# Patient Record
Sex: MALE | Race: ASIAN | NOT HISPANIC OR LATINO | URBAN - METROPOLITAN AREA
[De-identification: names, ages, dates, MRNs, and addresses within clinical notes are randomized per-mention and may not be internally consistent; named-entity substitution may affect disease eponyms.]

---

## 2023-03-09 PROBLEM — M10.9 GOUT: Status: ACTIVE | Noted: 2023-03-09

## 2023-03-09 PROBLEM — D49.6: Status: ACTIVE | Noted: 2023-03-09

## 2023-03-09 PROBLEM — I15.2 HYPERTENSION ASSOCIATED WITH DIABETES (MULTI): Status: ACTIVE | Noted: 2023-03-09

## 2023-03-09 PROBLEM — E11.8 DIABETES MELLITUS WITH COMPLICATION IN ADULT PATIENT (MULTI): Status: ACTIVE | Noted: 2023-03-09

## 2023-03-09 PROBLEM — E11.69 HYPERLIPIDEMIA ASSOCIATED WITH TYPE 2 DIABETES MELLITUS (MULTI): Status: ACTIVE | Noted: 2023-03-09

## 2023-03-09 PROBLEM — N52.9 ERECTILE DYSFUNCTION: Status: ACTIVE | Noted: 2023-03-09

## 2023-03-09 PROBLEM — E11.59 HYPERTENSION ASSOCIATED WITH DIABETES (MULTI): Status: ACTIVE | Noted: 2023-03-09

## 2023-03-09 PROBLEM — E78.5 HYPERLIPIDEMIA ASSOCIATED WITH TYPE 2 DIABETES MELLITUS (MULTI): Status: ACTIVE | Noted: 2023-03-09

## 2023-03-09 RX ORDER — AMLODIPINE BESYLATE 5 MG/1
1 TABLET ORAL DAILY
COMMUNITY
Start: 2022-05-02 | End: 2023-07-11 | Stop reason: SDUPTHER

## 2023-03-09 RX ORDER — BLOOD-GLUCOSE METER
EACH MISCELLANEOUS DAILY
COMMUNITY
Start: 2021-05-25 | End: 2023-07-31 | Stop reason: ALTCHOICE

## 2023-03-09 RX ORDER — LANCETS
EACH MISCELLANEOUS DAILY
COMMUNITY
End: 2023-07-31 | Stop reason: ALTCHOICE

## 2023-03-09 RX ORDER — ATORVASTATIN CALCIUM 40 MG/1
1 TABLET, FILM COATED ORAL NIGHTLY
COMMUNITY
Start: 2014-08-27 | End: 2023-07-11 | Stop reason: SDUPTHER

## 2023-03-09 RX ORDER — INSULIN PUMP SYRINGE, 3 ML
EACH MISCELLANEOUS
COMMUNITY
End: 2023-07-31 | Stop reason: ALTCHOICE

## 2023-03-09 RX ORDER — ASPIRIN 81 MG/1
1 TABLET ORAL DAILY
COMMUNITY
Start: 2021-05-13 | End: 2024-03-26 | Stop reason: SINTOL

## 2023-03-09 RX ORDER — METFORMIN HYDROCHLORIDE 500 MG/1
500 TABLET ORAL NIGHTLY
COMMUNITY
Start: 2021-05-25 | End: 2023-07-11 | Stop reason: SDUPTHER

## 2023-03-09 RX ORDER — PAROXETINE HYDROCHLORIDE 20 MG/1
1 TABLET, FILM COATED ORAL DAILY
COMMUNITY
Start: 2014-10-28 | End: 2023-07-11 | Stop reason: SDUPTHER

## 2023-03-09 RX ORDER — ALLOPURINOL 300 MG/1
1 TABLET ORAL DAILY
COMMUNITY
Start: 2021-11-04 | End: 2023-07-11 | Stop reason: SDUPTHER

## 2023-03-09 RX ORDER — OLMESARTAN MEDOXOMIL 40 MG/1
1 TABLET ORAL DAILY
COMMUNITY
Start: 2021-11-04 | End: 2023-07-11 | Stop reason: SDUPTHER

## 2023-03-10 ENCOUNTER — TELEMEDICINE (OUTPATIENT)
Dept: PRIMARY CARE | Facility: CLINIC | Age: 68
End: 2023-03-10
Payer: MEDICARE

## 2023-03-10 VITALS — WEIGHT: 150 LBS | BODY MASS INDEX: 24.21 KG/M2 | TEMPERATURE: 101 F

## 2023-03-10 DIAGNOSIS — U07.1 ACUTE BRONCHITIS DUE TO COVID-19 VIRUS: Primary | ICD-10-CM

## 2023-03-10 DIAGNOSIS — E11.59 HYPERTENSION ASSOCIATED WITH DIABETES (MULTI): ICD-10-CM

## 2023-03-10 DIAGNOSIS — D49.6: ICD-10-CM

## 2023-03-10 DIAGNOSIS — J20.8 ACUTE BRONCHITIS DUE TO COVID-19 VIRUS: Primary | ICD-10-CM

## 2023-03-10 DIAGNOSIS — M1A.2710 DRUG-INDUCED CHRONIC GOUT OF RIGHT FOOT WITHOUT TOPHUS: ICD-10-CM

## 2023-03-10 DIAGNOSIS — E11.69 HYPERLIPIDEMIA ASSOCIATED WITH TYPE 2 DIABETES MELLITUS (MULTI): ICD-10-CM

## 2023-03-10 DIAGNOSIS — I15.2 HYPERTENSION ASSOCIATED WITH DIABETES (MULTI): ICD-10-CM

## 2023-03-10 DIAGNOSIS — E11.8 DIABETES MELLITUS WITH COMPLICATION IN ADULT PATIENT (MULTI): ICD-10-CM

## 2023-03-10 DIAGNOSIS — E78.5 HYPERLIPIDEMIA ASSOCIATED WITH TYPE 2 DIABETES MELLITUS (MULTI): ICD-10-CM

## 2023-03-10 PROBLEM — F41.9 ANXIETY AND DEPRESSION: Status: ACTIVE | Noted: 2023-03-10

## 2023-03-10 PROBLEM — F32.A ANXIETY AND DEPRESSION: Status: ACTIVE | Noted: 2023-03-10

## 2023-03-10 PROCEDURE — 99212 OFFICE O/P EST SF 10 MIN: CPT | Performed by: INTERNAL MEDICINE

## 2023-03-10 RX ORDER — NIRMATRELVIR AND RITONAVIR 300-100 MG
KIT ORAL
Qty: 30 TABLET | Refills: 0 | Status: SHIPPED | OUTPATIENT
Start: 2023-03-10 | End: 2023-03-15

## 2023-03-10 ASSESSMENT — ENCOUNTER SYMPTOMS
VOMITING: 0
FATIGUE: 1
RHINORRHEA: 1
SORE THROAT: 1
FEVER: 1
SINUS PRESSURE: 1
COUGH: 0
WHEEZING: 0
SHORTNESS OF BREATH: 0
CHILLS: 1
CONSTIPATION: 0
NAUSEA: 0
PALPITATIONS: 0
DIARRHEA: 0

## 2023-03-10 NOTE — PROGRESS NOTES
Subjective   Patient ID: Fortunato Romano is a 67 y.o. male who presents for Establish Care (Positive covid last night, headches, body aches, chills, fever----symptoms started yesterday).    Assessment/Plan   Problem List Items Addressed This Visit          Nervous    Brain tumor, recurrent (CMS/HCC) - Primary     MRI once a year no seizure            Respiratory    Acute bronchitis due to COVID-19 virus     PAXLOVID twice a day for 5 days side effect explained take vitamin C vitamin D zinc melatonin if no better check the CBC BMP D-dimer LDH chest x-ray            Circulatory    Hypertension associated with diabetes (CMS/HCC)     Continue amlodipine aspirin Benicar check BMP every 6-month            Genitourinary    Erectile dysfunction       Endocrine/Metabolic    Diabetes mellitus with complication in adult patient (CMS/HCC)     Advised continue diet exercise plus metformin 500 at night refer to podiatry ophthalmology dietitian         Hyperlipidemia associated with type 2 diabetes mellitus (CMS/HCC)     Low-fat diet continue Lipitor 40 mg a day            Other    Gout     Gout allopurinol 300 mg a day check uric acid once a year gout diet education provided         Anxiety and depression     PHQ less than 5 continue Paxil 20 mg a day PHQ once a year            Source of history: Nurse, Medical personnel, Medical record, Patient.  History limitation: None.    HPI fever chills body ache onset acutely duration 2 days progressed acutely aggravating factor immunocompromised host post viral bronchitis sinusitis checked COVID-positive    Negative for loss of taste or smell    Negative for DVT or PE    Negative for hypoxia or confusion    No hypoglycemia no headache no chest pain no seizure    Allergies   Allergen Reactions    Pollen Extracts Unknown       Current Outpatient Medications   Medication Sig Dispense Refill    allopurinol (Zyloprim) 300 mg tablet Take 1 tablet (300 mg) by mouth once daily.      amLODIPine  (Norvasc) 5 mg tablet Take 1 tablet (5 mg) by mouth once daily.      aspirin 81 mg EC tablet Take 1 tablet (81 mg) by mouth once daily.      atorvastatin (Lipitor) 40 mg tablet Take 1 tablet (40 mg) by mouth once daily at bedtime.      metFORMIN (Glucophage) 500 mg tablet Take 1 tablet (500 mg) by mouth every 3 hours.      olmesartan (BENIcar) 40 mg tablet Take 1 tablet (40 mg) by mouth once daily.      PARoxetine (Paxil) 20 mg tablet Take 1 tablet (20 mg) by mouth once daily.      blood sugar diagnostic (OneTouch Verio test strips) strip once daily.      FreeStyle glucose monitoring kit       lancets misc once daily.       No current facility-administered medications for this visit.       Objective   Visit Vitals  Temp 38.3 °C (101 °F)   Wt 68 kg (150 lb)   BMI 24.21 kg/m²   Smoking Status Former   BSA 1.78 m²     Physical Exam  Review of Systems   Constitutional:  Positive for chills, fatigue and fever.   HENT:  Positive for congestion, ear discharge, ear pain, rhinorrhea, sinus pressure, sneezing and sore throat.    Respiratory:  Negative for cough, shortness of breath and wheezing.    Cardiovascular:  Negative for chest pain, palpitations and leg swelling.   Gastrointestinal:  Negative for constipation, diarrhea, nausea and vomiting.       No visits with results within 4 Month(s) from this visit.   Latest known visit with results is:   Legacy Encounter on 10/03/2022   Component Date Value Ref Range Status    Prostate Specific Antigen,Screen 10/03/2022 0.86  0.00 - 4.00 ng/mL Final    Color, Urine 10/03/2022 YELLOW  STRAW,YELLOW Final    Appearance, Urine 10/03/2022 CLEAR  CLEAR Final    Specific Gravity, Urine 10/03/2022 1.020  1.005 - 1.035 Final    pH, Urine 10/03/2022 6.0  5.0 - 8.0 Final    Protein, Urine 10/03/2022 NEGATIVE  NEGATIVE mg/dL Final    Glucose, Urine 10/03/2022 NEGATIVE  NEGATIVE mg/dL Final    Blood, Urine 10/03/2022 NEGATIVE  NEGATIVE Final    Ketones, Urine 10/03/2022 NEGATIVE  NEGATIVE  mg/dL Final    Bilirubin, Urine 10/03/2022 NEGATIVE  NEGATIVE Final    Urobilinogen, Urine 10/03/2022 <2.0  0.0 - 1.9 mg/dL Final    Nitrite, Urine 10/03/2022 NEGATIVE  NEGATIVE Final    Leukocyte Esterase, Urine 10/03/2022 NEGATIVE  NEGATIVE Final       Radiology: Reviewed imaging in powerchart.  No results found.    Family History   Problem Relation Name Age of Onset    Stomach cancer Mother      Stomach cancer Sister      Brain cancer Son       Social History     Socioeconomic History    Marital status:      Spouse name: None    Number of children: None    Years of education: None    Highest education level: None   Occupational History    None   Tobacco Use    Smoking status: Former     Types: Cigarettes    Smokeless tobacco: Never   Vaping Use    Vaping status: None   Substance and Sexual Activity    Alcohol use: Never    Drug use: Never    Sexual activity: None   Other Topics Concern    None   Social History Narrative    None     Social Determinants of Health     Financial Resource Strain: Not on file   Food Insecurity: Not on file   Transportation Needs: Not on file   Physical Activity: Not on file   Stress: Not on file   Social Connections: Not on file   Intimate Partner Violence: Not on file   Housing Stability: Not on file     Past Medical History:   Diagnosis Date    Benign prostatic hyperplasia without lower urinary tract symptoms 05/25/2021    Enlarged prostate without lower urinary tract symptoms (luts)    Encounter for screening for malignant neoplasm of respiratory organs 05/02/2022    Encounter for screening for lung cancer    Essential (primary) hypertension 05/13/2021    Benign essential hypertension    Generalized anxiety disorder 05/25/2021    VIVEK (generalized anxiety disorder)    Mixed hyperlipidemia 05/13/2021    Hyperlipidemia, mixed    Other abnormal glucose     Elevated glucose    Other conditions influencing health status     Postoperative seroma    Other conditions influencing  health status 02/05/2018    History of cough    Other specified soft tissue disorders 12/14/2017    Soft tissue mass    Pain in left knee 05/30/2017    Left knee pain    Personal history of benign neoplasm of the brain 05/13/2021    History of benign neoplasm of brain    Personal history of neoplasm of uncertain behavior 05/07/2018    History of neoplasm of uncertain behavior    Personal history of other diseases of the circulatory system 05/08/2019    History of hypertension    Personal history of other diseases of the digestive system 05/02/2022    History of fatty infiltration of liver    Personal history of other diseases of the musculoskeletal system and connective tissue 07/05/2022    History of polymyalgia rheumatica    Personal history of other diseases of the musculoskeletal system and connective tissue 11/04/2021    History of gout    Personal history of other diseases of the respiratory system 05/02/2022    History of sinusitis    Personal history of other diseases of the respiratory system 02/05/2018    History of upper respiratory infection    Personal history of other endocrine, nutritional and metabolic disease 11/04/2021    History of diabetes mellitus    Personal history of other endocrine, nutritional and metabolic disease 05/08/2019    History of hyperlipidemia    Personal history of other endocrine, nutritional and metabolic disease 05/25/2021    History of vitamin D deficiency    Personal history of other specified conditions 02/13/2018    History of ataxia    Personal history of other specified conditions 07/22/2016    History of urinary frequency    Personal history of other specified conditions 07/22/2016    History of fever    Personal history of other specified conditions 11/20/2017    History of bradycardia    Vitamin D deficiency, unspecified 09/08/2015    Vitamin D deficiency     Past Surgical History:   Procedure Laterality Date    MANDIBLE SURGERY  11/01/2017    Jaw Surgery    OTHER  SURGICAL HISTORY  07/22/2016    Brain Surgery       Charting was completed using voice recognition technology and may include unintended errors.

## 2023-03-10 NOTE — ASSESSMENT & PLAN NOTE
PAXLOVID twice a day for 5 days side effect explained take vitamin C vitamin D zinc melatonin if no better check the CBC BMP D-dimer LDH chest x-ray

## 2023-03-10 NOTE — ASSESSMENT & PLAN NOTE
Advised continue diet exercise plus metformin 500 at night refer to podiatry ophthalmology dietitian

## 2023-07-10 LAB
ALANINE AMINOTRANSFERASE (SGPT) (U/L) IN SER/PLAS: 14 U/L (ref 10–52)
ALBUMIN (G/DL) IN SER/PLAS: 4.5 G/DL (ref 3.4–5)
ALKALINE PHOSPHATASE (U/L) IN SER/PLAS: 77 U/L (ref 33–136)
ANION GAP IN SER/PLAS: 13 MMOL/L (ref 10–20)
ASPARTATE AMINOTRANSFERASE (SGOT) (U/L) IN SER/PLAS: 14 U/L (ref 9–39)
BASOPHILS (10*3/UL) IN BLOOD BY AUTOMATED COUNT: 0.07 X10E9/L (ref 0–0.1)
BASOPHILS/100 LEUKOCYTES IN BLOOD BY AUTOMATED COUNT: 1 % (ref 0–2)
BILIRUBIN TOTAL (MG/DL) IN SER/PLAS: 0.8 MG/DL (ref 0–1.2)
CALCIUM (MG/DL) IN SER/PLAS: 9.5 MG/DL (ref 8.6–10.6)
CARBON DIOXIDE, TOTAL (MMOL/L) IN SER/PLAS: 23 MMOL/L (ref 21–32)
CHLORIDE (MMOL/L) IN SER/PLAS: 109 MMOL/L (ref 98–107)
CHOLESTEROL (MG/DL) IN SER/PLAS: 148 MG/DL (ref 0–199)
CHOLESTEROL IN HDL (MG/DL) IN SER/PLAS: 46.8 MG/DL
CHOLESTEROL/HDL RATIO: 3.2
CREATININE (MG/DL) IN SER/PLAS: 0.96 MG/DL (ref 0.5–1.3)
EOSINOPHILS (10*3/UL) IN BLOOD BY AUTOMATED COUNT: 0.09 X10E9/L (ref 0–0.7)
EOSINOPHILS/100 LEUKOCYTES IN BLOOD BY AUTOMATED COUNT: 1.2 % (ref 0–6)
ERYTHROCYTE DISTRIBUTION WIDTH (RATIO) BY AUTOMATED COUNT: 12.8 % (ref 11.5–14.5)
ERYTHROCYTE MEAN CORPUSCULAR HEMOGLOBIN CONCENTRATION (G/DL) BY AUTOMATED: 32.5 G/DL (ref 32–36)
ERYTHROCYTE MEAN CORPUSCULAR VOLUME (FL) BY AUTOMATED COUNT: 91 FL (ref 80–100)
ERYTHROCYTES (10*6/UL) IN BLOOD BY AUTOMATED COUNT: 5.19 X10E12/L (ref 4.5–5.9)
ESTIMATED AVERAGE GLUCOSE FOR HBA1C: 134 MG/DL
GFR MALE: 85 ML/MIN/1.73M2
GLUCOSE (MG/DL) IN SER/PLAS: 107 MG/DL (ref 74–99)
HEMATOCRIT (%) IN BLOOD BY AUTOMATED COUNT: 47.1 % (ref 41–52)
HEMOGLOBIN (G/DL) IN BLOOD: 15.3 G/DL (ref 13.5–17.5)
HEMOGLOBIN A1C/HEMOGLOBIN TOTAL IN BLOOD: 6.3 %
IMMATURE GRANULOCYTES/100 LEUKOCYTES IN BLOOD BY AUTOMATED COUNT: 0.3 % (ref 0–0.9)
LDL: 68 MG/DL (ref 0–99)
LEUKOCYTES (10*3/UL) IN BLOOD BY AUTOMATED COUNT: 7.4 X10E9/L (ref 4.4–11.3)
LYMPHOCYTES (10*3/UL) IN BLOOD BY AUTOMATED COUNT: 2.09 X10E9/L (ref 1.2–4.8)
LYMPHOCYTES/100 LEUKOCYTES IN BLOOD BY AUTOMATED COUNT: 28.4 % (ref 13–44)
MONOCYTES (10*3/UL) IN BLOOD BY AUTOMATED COUNT: 0.45 X10E9/L (ref 0.1–1)
MONOCYTES/100 LEUKOCYTES IN BLOOD BY AUTOMATED COUNT: 6.1 % (ref 2–10)
NEUTROPHILS (10*3/UL) IN BLOOD BY AUTOMATED COUNT: 4.64 X10E9/L (ref 1.2–7.7)
NEUTROPHILS/100 LEUKOCYTES IN BLOOD BY AUTOMATED COUNT: 63 % (ref 40–80)
NRBC (PER 100 WBCS) BY AUTOMATED COUNT: 0 /100 WBC (ref 0–0)
PLATELETS (10*3/UL) IN BLOOD AUTOMATED COUNT: 291 X10E9/L (ref 150–450)
POTASSIUM (MMOL/L) IN SER/PLAS: 4.1 MMOL/L (ref 3.5–5.3)
PROTEIN TOTAL: 7.1 G/DL (ref 6.4–8.2)
SODIUM (MMOL/L) IN SER/PLAS: 141 MMOL/L (ref 136–145)
TRIGLYCERIDE (MG/DL) IN SER/PLAS: 168 MG/DL (ref 0–149)
URATE (MG/DL) IN SER/PLAS: 4.3 MG/DL (ref 4–7.5)
UREA NITROGEN (MG/DL) IN SER/PLAS: 18 MG/DL (ref 6–23)
VLDL: 34 MG/DL (ref 0–40)

## 2023-07-11 ENCOUNTER — TELEPHONE (OUTPATIENT)
Dept: PRIMARY CARE | Facility: CLINIC | Age: 68
End: 2023-07-11
Payer: MEDICARE

## 2023-07-11 DIAGNOSIS — E11.8 DIABETES MELLITUS WITH COMPLICATION IN ADULT PATIENT (MULTI): ICD-10-CM

## 2023-07-11 DIAGNOSIS — E78.5 HYPERLIPIDEMIA ASSOCIATED WITH TYPE 2 DIABETES MELLITUS (MULTI): ICD-10-CM

## 2023-07-11 DIAGNOSIS — E11.59 HYPERTENSION ASSOCIATED WITH DIABETES (MULTI): ICD-10-CM

## 2023-07-11 DIAGNOSIS — F41.9 ANXIETY AND DEPRESSION: ICD-10-CM

## 2023-07-11 DIAGNOSIS — I15.2 HYPERTENSION ASSOCIATED WITH DIABETES (MULTI): ICD-10-CM

## 2023-07-11 DIAGNOSIS — M1A.2710 DRUG-INDUCED CHRONIC GOUT OF RIGHT FOOT WITHOUT TOPHUS: ICD-10-CM

## 2023-07-11 DIAGNOSIS — E11.69 HYPERLIPIDEMIA ASSOCIATED WITH TYPE 2 DIABETES MELLITUS (MULTI): ICD-10-CM

## 2023-07-11 DIAGNOSIS — F32.A ANXIETY AND DEPRESSION: ICD-10-CM

## 2023-07-11 RX ORDER — PAROXETINE HYDROCHLORIDE 20 MG/1
20 TABLET, FILM COATED ORAL DAILY
Qty: 30 TABLET | Refills: 0 | Status: SHIPPED | OUTPATIENT
Start: 2023-07-11 | End: 2023-07-31 | Stop reason: SDUPTHER

## 2023-07-11 RX ORDER — ATORVASTATIN CALCIUM 40 MG/1
40 TABLET, FILM COATED ORAL NIGHTLY
Qty: 30 TABLET | Refills: 0 | Status: SHIPPED | OUTPATIENT
Start: 2023-07-11 | End: 2023-07-31 | Stop reason: SDUPTHER

## 2023-07-11 RX ORDER — METFORMIN HYDROCHLORIDE 500 MG/1
500 TABLET ORAL
Qty: 180 TABLET | Refills: 3 | Status: SHIPPED | OUTPATIENT
Start: 2023-07-11 | End: 2023-10-30 | Stop reason: SDUPTHER

## 2023-07-11 RX ORDER — AMLODIPINE BESYLATE 5 MG/1
5 TABLET ORAL DAILY
Qty: 30 TABLET | Refills: 0 | Status: SHIPPED | OUTPATIENT
Start: 2023-07-11 | End: 2023-07-31 | Stop reason: SDUPTHER

## 2023-07-11 RX ORDER — ALLOPURINOL 300 MG/1
300 TABLET ORAL DAILY
Qty: 30 TABLET | Refills: 0 | Status: SHIPPED | OUTPATIENT
Start: 2023-07-11 | End: 2023-07-31 | Stop reason: SDUPTHER

## 2023-07-11 RX ORDER — METFORMIN HYDROCHLORIDE 500 MG/1
500 TABLET ORAL
Qty: 30 TABLET | Refills: 0 | Status: SHIPPED | OUTPATIENT
Start: 2023-07-11 | End: 2023-07-11 | Stop reason: SDUPTHER

## 2023-07-11 RX ORDER — OLMESARTAN MEDOXOMIL 40 MG/1
40 TABLET ORAL DAILY
Qty: 30 TABLET | Refills: 0 | Status: SHIPPED | OUTPATIENT
Start: 2023-07-11 | End: 2023-10-30 | Stop reason: SDUPTHER

## 2023-07-11 NOTE — TELEPHONE ENCOUNTER
I let PT know your results and he understood.  Please call in Metformin if need be to accommodate 1 extra 500 mg pill a day.

## 2023-07-11 NOTE — TELEPHONE ENCOUNTER
----- Message from Lin Barbosa MD sent at 7/11/2023  9:09 AM EDT -----  Your hemoglobin A1c is 6.3 triglyceride 168Continue low-fat low-carb diet increase metformin 500 mg from 1 a day to twice a day

## 2023-07-31 ENCOUNTER — OFFICE VISIT (OUTPATIENT)
Dept: PRIMARY CARE | Facility: CLINIC | Age: 68
End: 2023-07-31
Payer: MEDICARE

## 2023-07-31 VITALS
HEIGHT: 66 IN | HEART RATE: 62 BPM | DIASTOLIC BLOOD PRESSURE: 73 MMHG | OXYGEN SATURATION: 96 % | SYSTOLIC BLOOD PRESSURE: 120 MMHG | WEIGHT: 152 LBS | TEMPERATURE: 97.4 F | BODY MASS INDEX: 24.43 KG/M2

## 2023-07-31 DIAGNOSIS — F32.A ANXIETY AND DEPRESSION: ICD-10-CM

## 2023-07-31 DIAGNOSIS — E11.8 DIABETES MELLITUS WITH COMPLICATION IN ADULT PATIENT (MULTI): ICD-10-CM

## 2023-07-31 DIAGNOSIS — D49.6: ICD-10-CM

## 2023-07-31 DIAGNOSIS — E11.59 HYPERTENSION ASSOCIATED WITH DIABETES (MULTI): ICD-10-CM

## 2023-07-31 DIAGNOSIS — F41.9 ANXIETY AND DEPRESSION: ICD-10-CM

## 2023-07-31 DIAGNOSIS — Z13.6 SCREENING FOR ABDOMINAL AORTIC ANEURYSM: ICD-10-CM

## 2023-07-31 DIAGNOSIS — M1A.2710 DRUG-INDUCED CHRONIC GOUT OF RIGHT FOOT WITHOUT TOPHUS: ICD-10-CM

## 2023-07-31 DIAGNOSIS — E78.5 HYPERLIPIDEMIA ASSOCIATED WITH TYPE 2 DIABETES MELLITUS (MULTI): ICD-10-CM

## 2023-07-31 DIAGNOSIS — N40.0 BENIGN PROSTATIC HYPERPLASIA WITHOUT LOWER URINARY TRACT SYMPTOMS: ICD-10-CM

## 2023-07-31 DIAGNOSIS — E11.69 HYPERLIPIDEMIA ASSOCIATED WITH TYPE 2 DIABETES MELLITUS (MULTI): ICD-10-CM

## 2023-07-31 DIAGNOSIS — Z00.00 MEDICARE ANNUAL WELLNESS VISIT, SUBSEQUENT: Primary | ICD-10-CM

## 2023-07-31 DIAGNOSIS — I15.2 HYPERTENSION ASSOCIATED WITH DIABETES (MULTI): ICD-10-CM

## 2023-07-31 PROBLEM — C71.4: Status: ACTIVE | Noted: 2023-07-31

## 2023-07-31 PROBLEM — C71.4: Status: RESOLVED | Noted: 2023-07-31 | Resolved: 2023-07-31

## 2023-07-31 PROCEDURE — 1159F MED LIST DOCD IN RCRD: CPT | Performed by: INTERNAL MEDICINE

## 2023-07-31 PROCEDURE — 4010F ACE/ARB THERAPY RXD/TAKEN: CPT | Performed by: INTERNAL MEDICINE

## 2023-07-31 PROCEDURE — 3078F DIAST BP <80 MM HG: CPT | Performed by: INTERNAL MEDICINE

## 2023-07-31 PROCEDURE — 1160F RVW MEDS BY RX/DR IN RCRD: CPT | Performed by: INTERNAL MEDICINE

## 2023-07-31 PROCEDURE — 3074F SYST BP LT 130 MM HG: CPT | Performed by: INTERNAL MEDICINE

## 2023-07-31 PROCEDURE — G0439 PPPS, SUBSEQ VISIT: HCPCS | Performed by: INTERNAL MEDICINE

## 2023-07-31 PROCEDURE — 1170F FXNL STATUS ASSESSED: CPT | Performed by: INTERNAL MEDICINE

## 2023-07-31 PROCEDURE — 3044F HG A1C LEVEL LT 7.0%: CPT | Performed by: INTERNAL MEDICINE

## 2023-07-31 PROCEDURE — 1157F ADVNC CARE PLAN IN RCRD: CPT | Performed by: INTERNAL MEDICINE

## 2023-07-31 PROCEDURE — 99213 OFFICE O/P EST LOW 20 MIN: CPT | Performed by: INTERNAL MEDICINE

## 2023-07-31 PROCEDURE — 1036F TOBACCO NON-USER: CPT | Performed by: INTERNAL MEDICINE

## 2023-07-31 RX ORDER — AMLODIPINE BESYLATE 5 MG/1
5 TABLET ORAL DAILY
Qty: 90 TABLET | Refills: 3 | Status: SHIPPED
Start: 2023-07-31 | End: 2023-07-31

## 2023-07-31 RX ORDER — ROSUVASTATIN CALCIUM 10 MG/1
10 TABLET, COATED ORAL DAILY
Qty: 30 TABLET | Refills: 5 | Status: SHIPPED | OUTPATIENT
Start: 2023-07-31 | End: 2023-09-25 | Stop reason: SDUPTHER

## 2023-07-31 RX ORDER — ATORVASTATIN CALCIUM 40 MG/1
40 TABLET, FILM COATED ORAL NIGHTLY
Qty: 90 TABLET | Refills: 3 | Status: SHIPPED
Start: 2023-07-31 | End: 2023-07-31

## 2023-07-31 RX ORDER — ALLOPURINOL 300 MG/1
300 TABLET ORAL DAILY
Qty: 90 TABLET | Refills: 3 | Status: SHIPPED
Start: 2023-07-31 | End: 2023-07-31

## 2023-07-31 RX ORDER — PAROXETINE HYDROCHLORIDE 20 MG/1
20 TABLET, FILM COATED ORAL DAILY
Qty: 90 TABLET | Refills: 3 | Status: SHIPPED
Start: 2023-07-31 | End: 2023-07-31

## 2023-07-31 ASSESSMENT — ACTIVITIES OF DAILY LIVING (ADL)
TAKING_MEDICATION: INDEPENDENT
BATHING: INDEPENDENT
GROCERY_SHOPPING: INDEPENDENT
MANAGING_FINANCES: INDEPENDENT
DRESSING: INDEPENDENT

## 2023-07-31 ASSESSMENT — PATIENT HEALTH QUESTIONNAIRE - PHQ9
1. LITTLE INTEREST OR PLEASURE IN DOING THINGS: NOT AT ALL
SUM OF ALL RESPONSES TO PHQ9 QUESTIONS 1 AND 2: 0
SUM OF ALL RESPONSES TO PHQ9 QUESTIONS 1 AND 2: 0
2. FEELING DOWN, DEPRESSED OR HOPELESS: NOT AT ALL
1. LITTLE INTEREST OR PLEASURE IN DOING THINGS: NOT AT ALL
2. FEELING DOWN, DEPRESSED OR HOPELESS: NOT AT ALL

## 2023-07-31 NOTE — PROGRESS NOTES
Assessment and Plan:  Problem List Items Addressed This Visit       Brain tumor, recurrent (CMS/HCC)     Neology once a year         Diabetes mellitus with complication in adult patient (CMS/HCC)     Diabetes is chronic disease, it does not get cured but it can be controlled, in modern medicine there are variety of measures taken to control DM. Usually we want to preserve beta cell functions. Please understand the disease and how our life style can affect control of glycemia. Diabetes leads to macro and microvascular complications and they could be devastating. It is important to have annual eye check and frequent foot inspection and foot inspection. Kidney dysfunction including dialysis, foot amputations, neuropathy, foot ulcers and accelerated atherosclerotic vascular disease are known complications of uncontrolled DM, pt was educated and explained.           Gout    Relevant Orders    Albumin , Urine Random    CBC and Auto Differential    Comprehensive Metabolic Panel    Hemoglobin A1C    Lipid Panel    Prostate Specific Antigen, Screen    TSH with reflex to Free T4 if abnormal    Hyperlipidemia associated with type 2 diabetes mellitus (CMS/HCC)                Relevant Orders    Albumin , Urine Random    CBC and Auto Differential    Comprehensive Metabolic Panel    Hemoglobin A1C    Lipid Panel    Prostate Specific Antigen, Screen    TSH with reflex to Free T4 if abnormal    Hypertension associated with diabetes (CMS/HCC)     Patients BP readings reviewed and addressed, as we age our arteries turn stiffer and less elastic. Restricting salt consumption and staying physically fit with regular exercise regimen is the only way to keep our vasculature less tonic. Studies have shown that keeping ideal body wt, exercise routine about 140 to 150 minutes a week, eating variety of plant based diet and drinking plentiful water are quite helpful. Monitor BP twice or once a week at home and bring log to be reviewed by me.  Uncontrolled BP has long term consequences including heart failure, myocardial infarction, accelerated atherosclerosis and kidney dysfunction. Therapy reviewed and explained.           Relevant Orders    Albumin , Urine Random    CBC and Auto Differential    Comprehensive Metabolic Panel    Hemoglobin A1C    Lipid Panel    Prostate Specific Antigen, Screen    TSH with reflex to Free T4 if abnormal    Anxiety and depression     Depression is chronic and quite common and notorious mental health disorder and it is quite common and widespread, there are several therapeutics available for depression now a days. It is considered as chemical imbalance disorder and with medications , it can be adjusted. There are side effects from SSRI and SNRIs but on long term, they are well tolerated. Please do not feel awkward or shy to contact us if feel tired, sleepy, lack of energy or aloof/ alone. Untreated depression can bring serious consequences including suicidal ideations, mental health counselling is available if need arises. Usually treatment of depression is long lasting therapy with periodic evaluations and follow ups. Pt with depression no longer have to feel frustrated, helpless or isolated.Detailed discussion was carried out.           Relevant Orders    Albumin , Urine Random    CBC and Auto Differential    Comprehensive Metabolic Panel    Hemoglobin A1C    Lipid Panel    Prostate Specific Antigen, Screen    TSH with reflex to Free T4 if abnormal    Medicare annual wellness visit, subsequent - Primary    Benign prostatic hyperplasia without lower urinary tract symptoms    Relevant Orders    Prostate Specific Antigen, Screen         Chief Complaint:   Annual Medicare Wellness Office Exam/Comprehensive Problem Focused Follow Up and Physical Exam      HPI: This is a 67-year-old patient  had a 2 children    1 brother for sister positive for hypertension hyperlipidemia diabetes    Mother  from old age    Father  with stomach cancer    Today came complaining of the headache dizziness neck pain onset acutely duration 2 weeks progressed slowly    Personal history of hypertension hyperlipidemia hyperglycemia BPH osteoarthritis gouty arthritis anxiety depression erectile dysfunction also had a history of the meningioma brain tumor s/p surgery follow-up with the neurosurgery and neuro-ophthalmology [facility].          Patient Active Problem List:  Patient Active Problem List   Diagnosis    Brain tumor, recurrent (CMS/HCC)    Diabetes mellitus with complication in adult patient (CMS/HCC)    Erectile dysfunction    Gout    Hyperlipidemia associated with type 2 diabetes mellitus (CMS/HCC)    Hypertension associated with diabetes (CMS/HCC)    Acute bronchitis due to COVID-19 virus    Anxiety and depression    Medicare annual wellness visit, subsequent    Benign prostatic hyperplasia without lower urinary tract symptoms          Comprehensive Medical/Surgical/Social/Family History:  Family History   Problem Relation Name Age of Onset    Stomach cancer Mother      Stomach cancer Sister      Brain cancer Son         Past Medical History:   Diagnosis Date    Benign prostatic hyperplasia without lower urinary tract symptoms 05/25/2021    Enlarged prostate without lower urinary tract symptoms (luts)    Encounter for screening for malignant neoplasm of respiratory organs 05/02/2022    Encounter for screening for lung cancer    Essential (primary) hypertension 05/13/2021    Benign essential hypertension    Generalized anxiety disorder 05/25/2021    VIVEK (generalized anxiety disorder)    Mixed hyperlipidemia 05/13/2021    Hyperlipidemia, mixed    Other abnormal glucose     Elevated glucose    Other conditions influencing health status     Postoperative seroma    Other conditions influencing health status 02/05/2018    History of cough    Other specified soft tissue disorders 12/14/2017    Soft tissue mass    Pain in left knee 05/30/2017     Left knee pain    Personal history of benign neoplasm of the brain 05/13/2021    History of benign neoplasm of brain    Personal history of neoplasm of uncertain behavior 05/07/2018    History of neoplasm of uncertain behavior    Personal history of other diseases of the circulatory system 05/08/2019    History of hypertension    Personal history of other diseases of the digestive system 05/02/2022    History of fatty infiltration of liver    Personal history of other diseases of the musculoskeletal system and connective tissue 07/05/2022    History of polymyalgia rheumatica    Personal history of other diseases of the musculoskeletal system and connective tissue 11/04/2021    History of gout    Personal history of other diseases of the respiratory system 05/02/2022    History of sinusitis    Personal history of other diseases of the respiratory system 02/05/2018    History of upper respiratory infection    Personal history of other endocrine, nutritional and metabolic disease 11/04/2021    History of diabetes mellitus    Personal history of other endocrine, nutritional and metabolic disease 05/08/2019    History of hyperlipidemia    Personal history of other endocrine, nutritional and metabolic disease 05/25/2021    History of vitamin D deficiency    Personal history of other specified conditions 02/13/2018    History of ataxia    Personal history of other specified conditions 07/22/2016    History of urinary frequency    Personal history of other specified conditions 07/22/2016    History of fever    Personal history of other specified conditions 11/20/2017    History of bradycardia    Vitamin D deficiency, unspecified 09/08/2015    Vitamin D deficiency       Past Surgical History:   Procedure Laterality Date    MANDIBLE SURGERY  11/01/2017    Jaw Surgery    OTHER SURGICAL HISTORY  07/22/2016    Brain Surgery       Social History     Socioeconomic History    Marital status:      Spouse name: None     Number of children: None    Years of education: None    Highest education level: None   Occupational History    None   Tobacco Use    Smoking status: Former     Types: Cigarettes    Smokeless tobacco: Never   Substance and Sexual Activity    Alcohol use: Never    Drug use: Never    Sexual activity: None   Other Topics Concern    None   Social History Narrative    None     Social Determinants of Health     Financial Resource Strain: Not on file   Food Insecurity: Not on file   Transportation Needs: Not on file   Physical Activity: Not on file   Stress: Not on file   Social Connections: Not on file   Intimate Partner Violence: Not on file   Housing Stability: Not on file       Tobacco/Alcohol/Opioid use, as well as Illicit Drug Use was screened for/reviewed and documented in Social Documentation section of the chart and medication list as appropriate    Allergies and Medications  Allergies   Allergen Reactions    Pollen Extracts Unknown     Current Outpatient Medications   Medication Sig Dispense Refill    aspirin 81 mg EC tablet Take 1 tablet (81 mg) by mouth once daily.      metFORMIN (Glucophage) 500 mg tablet Take 1 tablet (500 mg) by mouth 2 times a day with meals. 180 tablet 3    olmesartan (BENIcar) 40 mg tablet Take 1 tablet (40 mg) by mouth once daily. 30 tablet 0     No current facility-administered medications for this visit.       Medications and Supplements  prescribed by me and other practitioners or clinical pharmacist (such as prescriptions, OTC's, herbal therapies and supplements) were reviewed and documented in the medical record.     Advance directives  Advanced Care Planning (including a Living Will, Healthcare POA, as well as specific end of life choices and/or directives), was discussed for approximately 16 minutes with the patient and/or surrogate, voluntarily, and documented in the Problem List of the medical record.     Cardiac Risk Assessment  Cardiovascular risk was discussed and, if needed,  "lifestyle modifications recommended, including nutritional choices, exercise, and elimination of habits contributing to risk. We agreed on a plan to reduce the current cardiovascular risk based on above discussion as needed.  Aspirin use/disuse was discussed and documented in the Problem List of the medical record after reviewing the updated guidelines below:    Consider low dose Aspirin ( mg) use if the benefit for cardiovascular disease prevention outweighs risk for bleeding complications.   In general, low dose ASA should be considered:  In patients WITHOUT prior MI/stroke/PAD (primary prevention):   a. Age <60: Use if 10-year cardiovascular disease risk >20%, with discussion of risks and benefits with patient  b. Age 60-<70: Use if 10-year cardiovascular disease risk >20% and low bleeding (e.g., gastrointenstinal) risk, with discussion of risks and benefits with patient  c. Age >=70: Do not use    In patients WITH prior MI/stroke/PAD (secondary prevention):   Generally use unless extremely high bleeding (e.g., gastrointenstinal) risk, with discussion of risks and benefits with patient    ROS otherwise negative aside from what was mentioned above in HPI.    Visit Vitals  /73 (BP Location: Left arm, Patient Position: Sitting, BP Cuff Size: Adult)   Pulse 62   Temp 36.3 °C (97.4 °F)   Ht 1.676 m (5' 6\")   Wt 68.9 kg (152 lb)   SpO2 96%   BMI 24.53 kg/m²   Smoking Status Former   BSA 1.79 m²       Physical Exam      Physical Exam:    Appearance: Alert, oriented , cooperative,  in no acute distress. Well nourished & well hydrated.    Skin: Intact,  dry skin, no lesions, rash, petechiae or purpura.     Eyes: PERRLA, EOMs intact,  Conjunctiva pink with no redness or exudates. Cornea & anterior chamber are clear, Eyelids without lesions. No scleral icterus.     ENT: Hearing grossly intact. External auditory canals patent, tympanic membranes intact with visible landmarks. Nares patent, mucus membranes moist. " Dentition without lesions. Pharynx clear, uvula midline.     Neck: Supple, without meningismus. Thyroid not palpable. Trachea at midline. No lymphadenopathy.    Pulmonary: Clear bilaterally with good chest wall excursion. No rales, rhonchi or wheezing. No accessory muscle use or stridor.    Cardiac: Normal S1, S2 without murmur, rub, gallop or extrasystole. No JVD, Carotids without bruits.    Abdomen: Soft, nontender, active bowel sounds.  No palpable organomegaly.  No rebound or guarding.  No CVA tenderness.    Genitourinary: Exam deferred.    Musculoskeletal: Cervical spine tenderness  Neurological:  Cranial nerves II through XII are grossly intact, finger-nose touch is normal, normal sensation, no weakness, no focal findings identified.    Psychiatric: Appropriate mood and affect.   During the course of the visit the patient was educated and counseled about age appropriate screening and preventive services. Completed preventive screenings were documented in the chart and orders were placed for outstanding screenings/procedures as documented in the Assessment and Plan.    Patient Instructions (the written plan) was given to the patient at check out.    Charting was completed using voice recognition technology and may include unintended errors.

## 2023-08-06 DIAGNOSIS — I15.2 HYPERTENSION ASSOCIATED WITH DIABETES (MULTI): ICD-10-CM

## 2023-08-06 DIAGNOSIS — M1A.2710 DRUG-INDUCED CHRONIC GOUT OF RIGHT FOOT WITHOUT TOPHUS: ICD-10-CM

## 2023-08-06 DIAGNOSIS — E11.59 HYPERTENSION ASSOCIATED WITH DIABETES (MULTI): ICD-10-CM

## 2023-08-07 DIAGNOSIS — M1A.2710 DRUG-INDUCED CHRONIC GOUT OF RIGHT FOOT WITHOUT TOPHUS: ICD-10-CM

## 2023-08-07 DIAGNOSIS — I15.2 HYPERTENSION ASSOCIATED WITH DIABETES (MULTI): ICD-10-CM

## 2023-08-07 DIAGNOSIS — E11.59 HYPERTENSION ASSOCIATED WITH DIABETES (MULTI): ICD-10-CM

## 2023-08-07 RX ORDER — ALLOPURINOL 300 MG/1
300 TABLET ORAL DAILY
Qty: 30 TABLET | Refills: 0 | Status: SHIPPED | OUTPATIENT
Start: 2023-08-07 | End: 2023-08-07 | Stop reason: SDUPTHER

## 2023-08-07 RX ORDER — ALLOPURINOL 300 MG/1
300 TABLET ORAL DAILY
Qty: 90 TABLET | Refills: 3 | Status: SHIPPED | OUTPATIENT
Start: 2023-08-07 | End: 2023-10-30 | Stop reason: SDUPTHER

## 2023-08-07 RX ORDER — AMLODIPINE BESYLATE 5 MG/1
5 TABLET ORAL DAILY
Qty: 90 TABLET | Refills: 3 | Status: SHIPPED | OUTPATIENT
Start: 2023-08-07 | End: 2023-10-30 | Stop reason: SDUPTHER

## 2023-08-07 RX ORDER — AMLODIPINE BESYLATE 5 MG/1
5 TABLET ORAL DAILY
Qty: 30 TABLET | Refills: 0 | Status: SHIPPED | OUTPATIENT
Start: 2023-08-07 | End: 2023-08-07 | Stop reason: SDUPTHER

## 2023-08-11 ENCOUNTER — TELEPHONE (OUTPATIENT)
Dept: PRIMARY CARE | Facility: CLINIC | Age: 68
End: 2023-08-11
Payer: MEDICARE

## 2023-08-11 DIAGNOSIS — M19.90 ARTHRITIS: ICD-10-CM

## 2023-08-11 RX ORDER — IBUPROFEN 400 MG/1
400 TABLET ORAL 2 TIMES DAILY
Qty: 60 TABLET | Refills: 0 | Status: SHIPPED | OUTPATIENT
Start: 2023-08-11 | End: 2023-09-25

## 2023-08-11 RX ORDER — GABAPENTIN 100 MG/1
100 CAPSULE ORAL 2 TIMES DAILY
Qty: 60 CAPSULE | Refills: 0 | Status: SHIPPED | OUTPATIENT
Start: 2023-08-11 | End: 2023-09-25

## 2023-08-11 NOTE — TELEPHONE ENCOUNTER
----- Message from Lin Barbosa MD sent at 8/11/2023 11:36 AM EDT -----  IMPRESSION:   Moderate degenerative changes. No acute findings   Advised gabapentin 100 mg twice a day plus ibuprofen 400 twice a day follow-up 4 weeks

## 2023-09-07 ENCOUNTER — TELEPHONE (OUTPATIENT)
Dept: PRIMARY CARE | Facility: CLINIC | Age: 68
End: 2023-09-07

## 2023-09-07 ENCOUNTER — OFFICE VISIT (OUTPATIENT)
Dept: PRIMARY CARE | Facility: CLINIC | Age: 68
End: 2023-09-07
Payer: MEDICARE

## 2023-09-07 VITALS
DIASTOLIC BLOOD PRESSURE: 64 MMHG | BODY MASS INDEX: 24.75 KG/M2 | OXYGEN SATURATION: 94 % | WEIGHT: 154 LBS | HEIGHT: 66 IN | SYSTOLIC BLOOD PRESSURE: 114 MMHG | HEART RATE: 55 BPM

## 2023-09-07 DIAGNOSIS — S39.012A STRAIN OF LUMBAR REGION, INITIAL ENCOUNTER: Primary | ICD-10-CM

## 2023-09-07 DIAGNOSIS — S29.012A STRAIN OF THORACIC BACK REGION: ICD-10-CM

## 2023-09-07 DIAGNOSIS — E11.69 TYPE 2 DIABETES MELLITUS WITH OTHER SPECIFIED COMPLICATION, UNSPECIFIED WHETHER LONG TERM INSULIN USE (MULTI): ICD-10-CM

## 2023-09-07 PROCEDURE — 99214 OFFICE O/P EST MOD 30 MIN: CPT | Performed by: STUDENT IN AN ORGANIZED HEALTH CARE EDUCATION/TRAINING PROGRAM

## 2023-09-07 PROCEDURE — 1125F AMNT PAIN NOTED PAIN PRSNT: CPT | Performed by: STUDENT IN AN ORGANIZED HEALTH CARE EDUCATION/TRAINING PROGRAM

## 2023-09-07 PROCEDURE — 1157F ADVNC CARE PLAN IN RCRD: CPT | Performed by: STUDENT IN AN ORGANIZED HEALTH CARE EDUCATION/TRAINING PROGRAM

## 2023-09-07 PROCEDURE — 3044F HG A1C LEVEL LT 7.0%: CPT | Performed by: STUDENT IN AN ORGANIZED HEALTH CARE EDUCATION/TRAINING PROGRAM

## 2023-09-07 PROCEDURE — 1159F MED LIST DOCD IN RCRD: CPT | Performed by: STUDENT IN AN ORGANIZED HEALTH CARE EDUCATION/TRAINING PROGRAM

## 2023-09-07 PROCEDURE — 96372 THER/PROPH/DIAG INJ SC/IM: CPT | Performed by: STUDENT IN AN ORGANIZED HEALTH CARE EDUCATION/TRAINING PROGRAM

## 2023-09-07 PROCEDURE — 3074F SYST BP LT 130 MM HG: CPT | Performed by: STUDENT IN AN ORGANIZED HEALTH CARE EDUCATION/TRAINING PROGRAM

## 2023-09-07 PROCEDURE — 4010F ACE/ARB THERAPY RXD/TAKEN: CPT | Performed by: STUDENT IN AN ORGANIZED HEALTH CARE EDUCATION/TRAINING PROGRAM

## 2023-09-07 PROCEDURE — 1036F TOBACCO NON-USER: CPT | Performed by: STUDENT IN AN ORGANIZED HEALTH CARE EDUCATION/TRAINING PROGRAM

## 2023-09-07 PROCEDURE — 1160F RVW MEDS BY RX/DR IN RCRD: CPT | Performed by: STUDENT IN AN ORGANIZED HEALTH CARE EDUCATION/TRAINING PROGRAM

## 2023-09-07 PROCEDURE — 3078F DIAST BP <80 MM HG: CPT | Performed by: STUDENT IN AN ORGANIZED HEALTH CARE EDUCATION/TRAINING PROGRAM

## 2023-09-07 RX ORDER — KETOROLAC TROMETHAMINE 30 MG/ML
30 INJECTION, SOLUTION INTRAMUSCULAR; INTRAVENOUS ONCE
Status: COMPLETED | OUTPATIENT
Start: 2023-09-07 | End: 2023-09-07

## 2023-09-07 RX ORDER — METHOCARBAMOL 500 MG/1
500 TABLET, FILM COATED ORAL 4 TIMES DAILY PRN
Qty: 20 TABLET | Refills: 0 | Status: SHIPPED
Start: 2023-09-07 | End: 2023-10-30 | Stop reason: ALTCHOICE

## 2023-09-07 RX ADMIN — KETOROLAC TROMETHAMINE 30 MG: 30 INJECTION, SOLUTION INTRAMUSCULAR; INTRAVENOUS at 10:31

## 2023-09-07 ASSESSMENT — PAIN SCALES - GENERAL
PAINLEVEL: 9
PAINLEVEL_OUTOF10: 9

## 2023-09-07 ASSESSMENT — PAIN SCALES - PAIN ASSESSMENT IN ADVANCED DEMENTIA (PAINAD)
CONSOLABILITY: NO NEED TO CONSOLE
FACIALEXPRESSION: SMILING OR INEXPRESSIVE
BREATHING: NORMAL
TOTALSCORE: 1
BODYLANGUAGE: TENSE, DISTRESSED PACING, FIDGETING

## 2023-09-07 ASSESSMENT — PAIN SCALES - WONG BAKER: WONGBAKER_NUMERICALRESPONSE: HURTS LITTLE BIT

## 2023-09-07 NOTE — TELEPHONE ENCOUNTER
PT called back and listed all the meds he is taking.    aspirin 81 mg EC tablet ONCE A DAY  ibuprofen 400 mg tablet  2 TIMES DAILY  gabapentin (Neurontin) 100 mg capsule 2 times daily  amLODIPine (Norvasc) 5 mg tablet   metFORMIN (Glucophage) 500 mg tablet   rosuvastatin (Crestor) 10 mg tablet   allopurinol (Zyloprim) 300 mg tablet       He also takes these, but I did not see on list.  Atorvastan 40 mg once daily  Paroxetine 20 mg once daily

## 2023-09-07 NOTE — PROGRESS NOTES
"Subjective   Patient ID: Fortunato Romano is a 67 y.o. male who presents for the following    Assessment/Plan   #Thoracic Back Strain   #Lumbar Back Strain   #Hx of DM   -Labs reviewed  -Recent Cervical XR in July 2023 shows moderate degenerative changes only   -Med list reviewed.    PLAN  -Continue activity as tolerated.   -Advised to avoid heavy lifting until pain subsides  -APAP PRN for pain relief  -Robaxin 500mg PO QID PRN   -No indication for radiology at this time. No CTLS spine tenderness/deformity, no radiculopathy/myelopathic symptoms/signs.     Patient advised to continue activity as tolerated with frequent stretching of the thoracolumbar muscles. Advised to avoid heavy lifting. Will follow up in 1-2 weeks if pain does not improve.       Follow up with PCP as previously scheduled.     HPI  67M presents for acute visit.     He states that about 10 days ago there was a large fallen tree branch in his yard. He cut the branch and carried it to the trash and since then he has had Mid to low back pain. The pain is moderate to severe in severity without any major worsening or alleviation. It is minimally alleviated by using PRN APAP. No previous hx of back pain/strain. Denies any numbness/tingling/weakness in legs. Denies any saddle anesthesia, urinary/bowel incontinence.     Denies fevers, chills, weight loss, lightheadedness, dizziness, vision changes, sore throat, runny nose, CP, SOB, cough, palpitations, n/v/d, abd pain, black/bloody stools, or new numbness/weakness/tingling in arms/legs/face.        Social  T: denies  A: denies  D: denies    Retired      Visit Vitals  /64   Pulse 55   Ht 1.676 m (5' 6\")   Wt 69.9 kg (154 lb)   SpO2 94%   BMI 24.86 kg/m²   Smoking Status Former   BSA 1.8 m²     PHYSICAL EXAM   Physical Exam     Visit Vitals  /64   Pulse 55   Ht 1.676 m (5' 6\")   Wt 69.9 kg (154 lb)   SpO2 94%   BMI 24.86 kg/m²   Smoking Status Former   BSA 1.8 m²        General: NAD. NCAT. Aox3 "   HEENT: DEVANG. EOMI. MMM. Nares patent bl.  Cardiovascular: RRR. No MRG. S1/S2 wnl.   Respiratory: CTABL. No acute respiratory distress.   GI: Soft, NT abdomen.   : No CVAT BL  MSK: ROM x 4. CTLS non-tender without palpable deformity. He has paraspinal muscle tenderness of the BL thoraco lumbar regions. Negative SLR BL. No SI joint tenderness. No anterior, posterior, lateral hip tenderness BP. ROM of hip intact BL.   Extremities: No edema.  Skin: No rashes or bruises.   Neuro: Aox3. Cranial Nerves grossly intact. Motor/sensory wnl.   Psych: Mood wnl.       REVIEW OF SYSTEMS     ROS In HPI   Allergies   Allergen Reactions    Pollen Extracts Unknown       Current Outpatient Medications   Medication Sig Dispense Refill    allopurinol (Zyloprim) 300 mg tablet Take 1 tablet (300 mg) by mouth once daily. 90 tablet 3    amLODIPine (Norvasc) 5 mg tablet Take 1 tablet (5 mg) by mouth once daily. 90 tablet 3    aspirin 81 mg EC tablet Take 1 tablet (81 mg) by mouth once daily.      gabapentin (Neurontin) 100 mg capsule Take 1 capsule (100 mg) by mouth 2 times a day. 60 capsule 0    ibuprofen 400 mg tablet Take 1 tablet (400 mg) by mouth 2 times a day. 60 tablet 0    metFORMIN (Glucophage) 500 mg tablet Take 1 tablet (500 mg) by mouth 2 times a day with meals. 180 tablet 3    olmesartan (BENIcar) 40 mg tablet Take 1 tablet (40 mg) by mouth once daily. 30 tablet 0    rosuvastatin (Crestor) 10 mg tablet Take 1 tablet (10 mg) by mouth once daily. 30 tablet 5     No current facility-administered medications for this visit.       Objective     Orders Only on 07/10/2023   Component Date Value Ref Range Status    Glucose 07/10/2023 107 (H)  74 - 99 mg/dL Final    Sodium 07/10/2023 141  136 - 145 mmol/L Final    Potassium 07/10/2023 4.1  3.5 - 5.3 mmol/L Final    Chloride 07/10/2023 109 (H)  98 - 107 mmol/L Final    Bicarbonate 07/10/2023 23  21 - 32 mmol/L Final    Anion Gap 07/10/2023 13  10 - 20 mmol/L Final    Urea Nitrogen  07/10/2023 18  6 - 23 mg/dL Final    Creatinine 07/10/2023 0.96  0.50 - 1.30 mg/dL Final    GFR MALE 07/10/2023 85  >90 mL/min/1.73m2 Final    Calcium 07/10/2023 9.5  8.6 - 10.6 mg/dL Final    Albumin 07/10/2023 4.5  3.4 - 5.0 g/dL Final    Alkaline Phosphatase 07/10/2023 77  33 - 136 U/L Final    Total Protein 07/10/2023 7.1  6.4 - 8.2 g/dL Final    AST 07/10/2023 14  9 - 39 U/L Final    Total Bilirubin 07/10/2023 0.8  0.0 - 1.2 mg/dL Final    ALT (SGPT) 07/10/2023 14  10 - 52 U/L Final    Cholesterol 07/10/2023 148  0 - 199 mg/dL Final    HDL 07/10/2023 46.8  mg/dL Final    Cholesterol/HDL Ratio 07/10/2023 3.2   Final    LDL 07/10/2023 68  0 - 99 mg/dL Final    VLDL 07/10/2023 34  0 - 40 mg/dL Final    Triglycerides 07/10/2023 168 (H)  0 - 149 mg/dL Final    WBC 07/10/2023 7.4  4.4 - 11.3 x10E9/L Final    nRBC 07/10/2023 0.0  0.0 - 0.0 /100 WBC Final    RBC 07/10/2023 5.19  4.50 - 5.90 x10E12/L Final    Hemoglobin 07/10/2023 15.3  13.5 - 17.5 g/dL Final    Hematocrit 07/10/2023 47.1  41.0 - 52.0 % Final    MCV 07/10/2023 91  80 - 100 fL Final    MCHC 07/10/2023 32.5  32.0 - 36.0 g/dL Final    Platelets 07/10/2023 291  150 - 450 x10E9/L Final    RDW 07/10/2023 12.8  11.5 - 14.5 % Final    Neutrophils % 07/10/2023 63.0  40.0 - 80.0 % Final    Immature Granulocytes %, Automated 07/10/2023 0.3  0.0 - 0.9 % Final    Lymphocytes % 07/10/2023 28.4  13.0 - 44.0 % Final    Monocytes % 07/10/2023 6.1  2.0 - 10.0 % Final    Eosinophils % 07/10/2023 1.2  0.0 - 6.0 % Final    Basophils % 07/10/2023 1.0  0.0 - 2.0 % Final    Neutrophils Absolute 07/10/2023 4.64  1.20 - 7.70 x10E9/L Final    Lymphocytes Absolute 07/10/2023 2.09  1.20 - 4.80 x10E9/L Final    Monocytes Absolute 07/10/2023 0.45  0.10 - 1.00 x10E9/L Final    Eosinophils Absolute 07/10/2023 0.09  0.00 - 0.70 x10E9/L Final    Basophils Absolute 07/10/2023 0.07  0.00 - 0.10 x10E9/L Final    Hemoglobin A1C 07/10/2023 6.3 (A)  % Final    Estimated Average Glucose  07/10/2023 134  MG/DL Final    Uric Acid 07/10/2023 4.3  4.0 - 7.5 mg/dL Final       Radiology: Reviewed imaging in powerchart.  XR cervical spine complete 4-5 views    Result Date: 7/31/2023  Xrays cervical Spine upright AP lateral obliques 4 views  CLINICAL HISTORY: PAIN,  neck pain, chronic COMPARISON:  MRI cervical 10/21/2014 FINDINGS: Lateral view shows straightening of cervical lordosis but otherwise normal cervical body height and alignment with no fracture or aggressive bone lesion. There is disc space narrowing and endplate spurring most prominent at C5-6 and C6-7. Multilevel mild-to-moderate facet arthropathy. Limited evaluation of foramina on the oblique views particularly on the left side. Postop hardware in the mandible and occipital bone. Normal prevertebral soft tissue space. IMPRESSION: Moderate degenerative changes. No acute findings. Electronically signed by:  Nathanael Méndez MD  8/8/2023 9:55 AM CDT Workstation: [id number] Technologist:  AN Dictated By:   NATHANAEL MÉNDEZ MD Signed By:     NATHANAEL MÉNDEZ MD Signed Out:    08/08/23 10:55:52      Family History   Problem Relation Name Age of Onset    Stomach cancer Mother      Stomach cancer Sister      Brain cancer Son       Social History     Socioeconomic History    Marital status:      Spouse name: None    Number of children: None    Years of education: None    Highest education level: None   Occupational History    None   Tobacco Use    Smoking status: Former     Types: Cigarettes    Smokeless tobacco: Never   Substance and Sexual Activity    Alcohol use: Never    Drug use: Never    Sexual activity: None   Other Topics Concern    None   Social History Narrative    None     Social Determinants of Health     Financial Resource Strain: Not on file   Food Insecurity: Not on file   Transportation Needs: Not on file   Physical Activity: Not on file   Stress: Not on file   Social Connections: Not on file   Intimate Partner Violence: Not on file    Housing Stability: Not on file     Past Medical History:   Diagnosis Date    Benign prostatic hyperplasia without lower urinary tract symptoms 05/25/2021    Enlarged prostate without lower urinary tract symptoms (luts)    Encounter for screening for malignant neoplasm of respiratory organs 05/02/2022    Encounter for screening for lung cancer    Essential (primary) hypertension 05/13/2021    Benign essential hypertension    Generalized anxiety disorder 05/25/2021    VIVEK (generalized anxiety disorder)    Mixed hyperlipidemia 05/13/2021    Hyperlipidemia, mixed    Other abnormal glucose     Elevated glucose    Other conditions influencing health status     Postoperative seroma    Other conditions influencing health status 02/05/2018    History of cough    Other specified soft tissue disorders 12/14/2017    Soft tissue mass    Pain in left knee 05/30/2017    Left knee pain    Personal history of benign neoplasm of the brain 05/13/2021    History of benign neoplasm of brain    Personal history of neoplasm of uncertain behavior 05/07/2018    History of neoplasm of uncertain behavior    Personal history of other diseases of the circulatory system 05/08/2019    History of hypertension    Personal history of other diseases of the digestive system 05/02/2022    History of fatty infiltration of liver    Personal history of other diseases of the musculoskeletal system and connective tissue 07/05/2022    History of polymyalgia rheumatica    Personal history of other diseases of the musculoskeletal system and connective tissue 11/04/2021    History of gout    Personal history of other diseases of the respiratory system 05/02/2022    History of sinusitis    Personal history of other diseases of the respiratory system 02/05/2018    History of upper respiratory infection    Personal history of other endocrine, nutritional and metabolic disease 11/04/2021    History of diabetes mellitus    Personal history of other endocrine,  nutritional and metabolic disease 05/08/2019    History of hyperlipidemia    Personal history of other endocrine, nutritional and metabolic disease 05/25/2021    History of vitamin D deficiency    Personal history of other specified conditions 02/13/2018    History of ataxia    Personal history of other specified conditions 07/22/2016    History of urinary frequency    Personal history of other specified conditions 07/22/2016    History of fever    Personal history of other specified conditions 11/20/2017    History of bradycardia    Vitamin D deficiency, unspecified 09/08/2015    Vitamin D deficiency     Past Surgical History:   Procedure Laterality Date    MANDIBLE SURGERY  11/01/2017    Jaw Surgery    OTHER SURGICAL HISTORY  07/22/2016    Brain Surgery       Charting was completed using voice recognition technology and may include unintended errors.

## 2023-09-24 DIAGNOSIS — M19.90 ARTHRITIS: ICD-10-CM

## 2023-09-25 DIAGNOSIS — E78.5 HYPERLIPIDEMIA ASSOCIATED WITH TYPE 2 DIABETES MELLITUS (MULTI): ICD-10-CM

## 2023-09-25 DIAGNOSIS — M19.90 ARTHRITIS: ICD-10-CM

## 2023-09-25 DIAGNOSIS — E11.69 HYPERLIPIDEMIA ASSOCIATED WITH TYPE 2 DIABETES MELLITUS (MULTI): ICD-10-CM

## 2023-09-25 RX ORDER — GABAPENTIN 100 MG/1
100 CAPSULE ORAL 2 TIMES DAILY
Qty: 60 CAPSULE | Refills: 0 | Status: SHIPPED | OUTPATIENT
Start: 2023-09-25 | End: 2023-10-30 | Stop reason: SDUPTHER

## 2023-09-25 RX ORDER — GABAPENTIN 100 MG/1
100 CAPSULE ORAL 2 TIMES DAILY
Qty: 60 CAPSULE | Refills: 0 | Status: SHIPPED | OUTPATIENT
Start: 2023-09-25 | End: 2023-09-25 | Stop reason: SDUPTHER

## 2023-09-25 RX ORDER — ROSUVASTATIN CALCIUM 10 MG/1
10 TABLET, COATED ORAL DAILY
Qty: 30 TABLET | Refills: 5 | Status: SHIPPED | OUTPATIENT
Start: 2023-09-25 | End: 2023-10-30 | Stop reason: SDUPTHER

## 2023-09-25 RX ORDER — IBUPROFEN 400 MG/1
400 TABLET ORAL 2 TIMES DAILY
Qty: 60 TABLET | Refills: 0 | Status: SHIPPED
Start: 2023-09-25 | End: 2024-03-26 | Stop reason: SINTOL

## 2023-09-25 RX ORDER — IBUPROFEN 400 MG/1
400 TABLET ORAL 2 TIMES DAILY
Qty: 60 TABLET | Refills: 0 | Status: SHIPPED | OUTPATIENT
Start: 2023-09-25 | End: 2023-09-25 | Stop reason: SDUPTHER

## 2023-10-30 ENCOUNTER — LAB (OUTPATIENT)
Dept: LAB | Facility: LAB | Age: 68
End: 2023-10-30
Payer: MEDICARE

## 2023-10-30 ENCOUNTER — OFFICE VISIT (OUTPATIENT)
Dept: PRIMARY CARE | Facility: CLINIC | Age: 68
End: 2023-10-30
Payer: MEDICARE

## 2023-10-30 VITALS
HEART RATE: 63 BPM | BODY MASS INDEX: 24.56 KG/M2 | DIASTOLIC BLOOD PRESSURE: 65 MMHG | SYSTOLIC BLOOD PRESSURE: 125 MMHG | TEMPERATURE: 97.7 F | OXYGEN SATURATION: 96 % | HEIGHT: 66 IN | WEIGHT: 152.8 LBS

## 2023-10-30 DIAGNOSIS — I15.2 HYPERTENSION ASSOCIATED WITH DIABETES (MULTI): ICD-10-CM

## 2023-10-30 DIAGNOSIS — F41.9 ANXIETY AND DEPRESSION: ICD-10-CM

## 2023-10-30 DIAGNOSIS — M19.90 ARTHRITIS: ICD-10-CM

## 2023-10-30 DIAGNOSIS — E11.69 HYPERLIPIDEMIA ASSOCIATED WITH TYPE 2 DIABETES MELLITUS (MULTI): ICD-10-CM

## 2023-10-30 DIAGNOSIS — E78.5 HYPERLIPIDEMIA ASSOCIATED WITH TYPE 2 DIABETES MELLITUS (MULTI): ICD-10-CM

## 2023-10-30 DIAGNOSIS — E11.8 DIABETES MELLITUS WITH COMPLICATION IN ADULT PATIENT (MULTI): ICD-10-CM

## 2023-10-30 DIAGNOSIS — F32.A ANXIETY AND DEPRESSION: ICD-10-CM

## 2023-10-30 DIAGNOSIS — E11.59 HYPERTENSION ASSOCIATED WITH DIABETES (MULTI): ICD-10-CM

## 2023-10-30 DIAGNOSIS — M1A.2710 DRUG-INDUCED CHRONIC GOUT OF RIGHT FOOT WITHOUT TOPHUS: ICD-10-CM

## 2023-10-30 DIAGNOSIS — D49.6: Primary | ICD-10-CM

## 2023-10-30 PROBLEM — J20.8 ACUTE BRONCHITIS DUE TO COVID-19 VIRUS: Status: RESOLVED | Noted: 2023-03-10 | Resolved: 2023-10-30

## 2023-10-30 PROBLEM — U07.1 ACUTE BRONCHITIS DUE TO COVID-19 VIRUS: Status: RESOLVED | Noted: 2023-03-10 | Resolved: 2023-10-30

## 2023-10-30 LAB
ALBUMIN SERPL BCP-MCNC: 4.5 G/DL (ref 3.4–5)
ALP SERPL-CCNC: 92 U/L (ref 33–136)
ALT SERPL W P-5'-P-CCNC: 22 U/L (ref 10–52)
ANION GAP SERPL CALC-SCNC: 17 MMOL/L (ref 10–20)
AST SERPL W P-5'-P-CCNC: 18 U/L (ref 9–39)
BASOPHILS # BLD AUTO: 0.06 X10*3/UL (ref 0–0.1)
BASOPHILS NFR BLD AUTO: 1 %
BILIRUB SERPL-MCNC: 0.6 MG/DL (ref 0–1.2)
BUN SERPL-MCNC: 14 MG/DL (ref 6–23)
CALCIUM SERPL-MCNC: 9.5 MG/DL (ref 8.6–10.6)
CHLORIDE SERPL-SCNC: 107 MMOL/L (ref 98–107)
CHOLEST SERPL-MCNC: 151 MG/DL (ref 0–199)
CHOLESTEROL/HDL RATIO: 3.3
CO2 SERPL-SCNC: 22 MMOL/L (ref 21–32)
CREAT SERPL-MCNC: 0.89 MG/DL (ref 0.5–1.3)
EOSINOPHIL # BLD AUTO: 0.07 X10*3/UL (ref 0–0.7)
EOSINOPHIL NFR BLD AUTO: 1.1 %
ERYTHROCYTE [DISTWIDTH] IN BLOOD BY AUTOMATED COUNT: 12.9 % (ref 11.5–14.5)
EST. AVERAGE GLUCOSE BLD GHB EST-MCNC: 137 MG/DL
GFR SERPL CREATININE-BSD FRML MDRD: >90 ML/MIN/1.73M*2
GLUCOSE SERPL-MCNC: 106 MG/DL (ref 74–99)
HBA1C MFR BLD: 6.4 %
HCT VFR BLD AUTO: 47.4 % (ref 41–52)
HDLC SERPL-MCNC: 45.2 MG/DL
HGB BLD-MCNC: 15.4 G/DL (ref 13.5–17.5)
IMM GRANULOCYTES # BLD AUTO: 0.02 X10*3/UL (ref 0–0.7)
IMM GRANULOCYTES NFR BLD AUTO: 0.3 % (ref 0–0.9)
LDLC SERPL CALC-MCNC: 73 MG/DL
LYMPHOCYTES # BLD AUTO: 2.03 X10*3/UL (ref 1.2–4.8)
LYMPHOCYTES NFR BLD AUTO: 32.8 %
MCH RBC QN AUTO: 28.6 PG (ref 26–34)
MCHC RBC AUTO-ENTMCNC: 32.5 G/DL (ref 32–36)
MCV RBC AUTO: 88 FL (ref 80–100)
MONOCYTES # BLD AUTO: 0.6 X10*3/UL (ref 0.1–1)
MONOCYTES NFR BLD AUTO: 9.7 %
NEUTROPHILS # BLD AUTO: 3.41 X10*3/UL (ref 1.2–7.7)
NEUTROPHILS NFR BLD AUTO: 55.1 %
NON HDL CHOLESTEROL: 106 MG/DL (ref 0–149)
NRBC BLD-RTO: 0 /100 WBCS (ref 0–0)
PLATELET # BLD AUTO: 284 X10*3/UL (ref 150–450)
PMV BLD AUTO: 9.6 FL (ref 7.5–11.5)
POTASSIUM SERPL-SCNC: 4.3 MMOL/L (ref 3.5–5.3)
PROT SERPL-MCNC: 7.4 G/DL (ref 6.4–8.2)
RBC # BLD AUTO: 5.38 X10*6/UL (ref 4.5–5.9)
SODIUM SERPL-SCNC: 142 MMOL/L (ref 136–145)
TRIGL SERPL-MCNC: 165 MG/DL (ref 0–149)
VLDL: 33 MG/DL (ref 0–40)
WBC # BLD AUTO: 6.2 X10*3/UL (ref 4.4–11.3)

## 2023-10-30 PROCEDURE — 85025 COMPLETE CBC W/AUTO DIFF WBC: CPT

## 2023-10-30 PROCEDURE — 1125F AMNT PAIN NOTED PAIN PRSNT: CPT | Performed by: INTERNAL MEDICINE

## 2023-10-30 PROCEDURE — 99214 OFFICE O/P EST MOD 30 MIN: CPT | Performed by: INTERNAL MEDICINE

## 2023-10-30 PROCEDURE — 1160F RVW MEDS BY RX/DR IN RCRD: CPT | Performed by: INTERNAL MEDICINE

## 2023-10-30 PROCEDURE — 3078F DIAST BP <80 MM HG: CPT | Performed by: INTERNAL MEDICINE

## 2023-10-30 PROCEDURE — 80061 LIPID PANEL: CPT

## 2023-10-30 PROCEDURE — 1036F TOBACCO NON-USER: CPT | Performed by: INTERNAL MEDICINE

## 2023-10-30 PROCEDURE — 36415 COLL VENOUS BLD VENIPUNCTURE: CPT

## 2023-10-30 PROCEDURE — 1159F MED LIST DOCD IN RCRD: CPT | Performed by: INTERNAL MEDICINE

## 2023-10-30 PROCEDURE — 83036 HEMOGLOBIN GLYCOSYLATED A1C: CPT

## 2023-10-30 PROCEDURE — 3044F HG A1C LEVEL LT 7.0%: CPT | Performed by: INTERNAL MEDICINE

## 2023-10-30 PROCEDURE — 4010F ACE/ARB THERAPY RXD/TAKEN: CPT | Performed by: INTERNAL MEDICINE

## 2023-10-30 PROCEDURE — 80053 COMPREHEN METABOLIC PANEL: CPT

## 2023-10-30 PROCEDURE — 3074F SYST BP LT 130 MM HG: CPT | Performed by: INTERNAL MEDICINE

## 2023-10-30 RX ORDER — OLMESARTAN MEDOXOMIL 40 MG/1
40 TABLET ORAL DAILY
Qty: 90 TABLET | Refills: 3 | Status: SHIPPED | OUTPATIENT
Start: 2023-10-30 | End: 2023-12-18

## 2023-10-30 RX ORDER — METFORMIN HYDROCHLORIDE 500 MG/1
500 TABLET ORAL
Qty: 180 TABLET | Refills: 3 | Status: SHIPPED
Start: 2023-10-30 | End: 2024-03-26 | Stop reason: SINTOL

## 2023-10-30 RX ORDER — GABAPENTIN 100 MG/1
100 CAPSULE ORAL 2 TIMES DAILY
Qty: 60 CAPSULE | Refills: 0 | Status: SHIPPED | OUTPATIENT
Start: 2023-10-30 | End: 2023-12-18

## 2023-10-30 RX ORDER — PAROXETINE HYDROCHLORIDE 20 MG/1
20 TABLET, FILM COATED ORAL EVERY MORNING
COMMUNITY
Start: 2017-12-01 | End: 2023-10-30 | Stop reason: SDUPTHER

## 2023-10-30 RX ORDER — ALLOPURINOL 300 MG/1
300 TABLET ORAL DAILY
Qty: 90 TABLET | Refills: 3 | Status: SHIPPED | OUTPATIENT
Start: 2023-10-30

## 2023-10-30 RX ORDER — PAROXETINE HYDROCHLORIDE 20 MG/1
20 TABLET, FILM COATED ORAL EVERY MORNING
Qty: 90 TABLET | Refills: 3 | Status: SHIPPED | OUTPATIENT
Start: 2023-10-30

## 2023-10-30 RX ORDER — AMLODIPINE BESYLATE 5 MG/1
5 TABLET ORAL DAILY
Qty: 90 TABLET | Refills: 3 | Status: SHIPPED
Start: 2023-10-30 | End: 2024-05-28 | Stop reason: WASHOUT

## 2023-10-30 RX ORDER — ROSUVASTATIN CALCIUM 10 MG/1
10 TABLET, COATED ORAL DAILY
Qty: 90 TABLET | Refills: 3 | Status: SHIPPED
Start: 2023-10-30 | End: 2024-05-28 | Stop reason: WASHOUT

## 2023-10-30 NOTE — PROGRESS NOTES
Subjective   Patient ID: Fortunato Romano is a 68 y.o. male who presents for Follow-up (3 month on Ultrasound and Xrays).    Assessment/Plan     Problem List Items Addressed This Visit       Brain tumor, recurrent (CMS/HCC) - Primary     Neurology follow-up once a year         Diabetes mellitus with complication in adult patient (CMS/HCC)     Refer to podiatry ophthalmology         Relevant Medications    metFORMIN (Glucophage) 500 mg tablet    Other Relevant Orders    CBC and Auto Differential    Comprehensive Metabolic Panel    Hemoglobin A1C    Lipid Panel    Albumin , Urine Random    Gout    Relevant Medications    allopurinol (Zyloprim) 300 mg tablet    Other Relevant Orders    CBC and Auto Differential    Comprehensive Metabolic Panel    Hemoglobin A1C    Lipid Panel    Albumin , Urine Random    Hyperlipidemia associated with type 2 diabetes mellitus (CMS/HCC)     Diabetes is chronic disease, it does not get cured but it can be controlled, in modern medicine there are variety of measures taken to control DM. Usually we want to preserve beta cell functions. Please understand the disease and how our life style can affect control of glycemia. Diabetes leads to macro and microvascular complications and they could be devastating. It is important to have annual eye check and frequent foot inspection and foot inspection. Kidney dysfunction including dialysis, foot amputations, neuropathy, foot ulcers and accelerated atherosclerotic vascular disease are known complications of uncontrolled DM, pt was educated and explained.           Relevant Medications    rosuvastatin (Crestor) 10 mg tablet    Other Relevant Orders    CBC and Auto Differential    Comprehensive Metabolic Panel    Hemoglobin A1C    Lipid Panel    Albumin , Urine Random    Hypertension associated with diabetes (CMS/HCC)     Patients BP readings reviewed and addressed, as we age our arteries turn stiffer and less elastic. Restricting salt consumption and  staying physically fit with regular exercise regimen is the only way to keep our vasculature less tonic. Studies have shown that keeping ideal body wt, exercise routine about 140 to 150 minutes a week, eating variety of plant based diet and drinking plentiful water are quite helpful. Monitor BP twice or once a week at home and bring log to be reviewed by me. Uncontrolled BP has long term consequences including heart failure, myocardial infarction, accelerated atherosclerosis and kidney dysfunction. Therapy reviewed and explained.           Relevant Medications    amLODIPine (Norvasc) 5 mg tablet    olmesartan (BENIcar) 40 mg tablet    Other Relevant Orders    CBC and Auto Differential    Comprehensive Metabolic Panel    Hemoglobin A1C    Lipid Panel    Albumin , Urine Random    Anxiety and depression     Depression is chronic and quite common and notorious mental health disorder and it is quite common and widespread, there are several therapeutics available for depression now a days. It is considered as chemical imbalance disorder and with medications , it can be adjusted. There are side effects from SSRI and SNRIs but on long term, they are well tolerated. Please do not feel awkward or shy to contact us if feel tired, sleepy, lack of energy or aloof/ alone. Untreated depression can bring serious consequences including suicidal ideations, mental health counselling is available if need arises. Usually treatment of depression is long lasting therapy with periodic evaluations and follow ups. Pt with depression no longer have to feel frustrated, helpless or isolated.Detailed discussion was carried out.           Relevant Medications    PARoxetine (PaxiL) 20 mg tablet    Other Relevant Orders    CBC and Auto Differential    Comprehensive Metabolic Panel    Hemoglobin A1C    Lipid Panel    Albumin , Urine Random     Other Visit Diagnoses       Arthritis        Relevant Medications    gabapentin (Neurontin) 100 mg capsule     Other Relevant Orders    CBC and Auto Differential    Comprehensive Metabolic Panel    Hemoglobin A1C    Lipid Panel    Albumin , Urine Random        Ultrasound normal no aneurysm  X-ray arthritis of cervical spine      HPI this is a 68-year-old patient of osteoarthritis gouty arthritis cervical lumbar radiculopathy diabetes with hypertension hyperlipidemia anxiety depression combined with the tingling numbness upper lower extremity neck pain improved after giving the meloxicam and gabapentin    Negative for headache or chest pain    Negative for fall    Negative for weakness    Negative for bladder bowel involvement    Aggravating factor change of the weather exercise relieving factor none associated problem cervical lumbar radiculopathy with diabetes  Past Medical History:   Diagnosis Date    Benign prostatic hyperplasia without lower urinary tract symptoms 05/25/2021    Enlarged prostate without lower urinary tract symptoms (luts)    Encounter for screening for malignant neoplasm of respiratory organs 05/02/2022    Encounter for screening for lung cancer    Essential (primary) hypertension 05/13/2021    Benign essential hypertension    Generalized anxiety disorder 05/25/2021    VIVEK (generalized anxiety disorder)    Mixed hyperlipidemia 05/13/2021    Hyperlipidemia, mixed    Other abnormal glucose     Elevated glucose    Other conditions influencing health status     Postoperative seroma    Other conditions influencing health status 02/05/2018    History of cough    Other specified soft tissue disorders 12/14/2017    Soft tissue mass    Pain in left knee 05/30/2017    Left knee pain    Personal history of benign neoplasm of the brain 05/13/2021    History of benign neoplasm of brain    Personal history of neoplasm of uncertain behavior 05/07/2018    History of neoplasm of uncertain behavior    Personal history of other diseases of the circulatory system 05/08/2019    History of hypertension    Personal history  of other diseases of the digestive system 05/02/2022    History of fatty infiltration of liver    Personal history of other diseases of the musculoskeletal system and connective tissue 07/05/2022    History of polymyalgia rheumatica    Personal history of other diseases of the musculoskeletal system and connective tissue 11/04/2021    History of gout    Personal history of other diseases of the respiratory system 05/02/2022    History of sinusitis    Personal history of other diseases of the respiratory system 02/05/2018    History of upper respiratory infection    Personal history of other endocrine, nutritional and metabolic disease 11/04/2021    History of diabetes mellitus    Personal history of other endocrine, nutritional and metabolic disease 05/08/2019    History of hyperlipidemia    Personal history of other endocrine, nutritional and metabolic disease 05/25/2021    History of vitamin D deficiency    Personal history of other specified conditions 02/13/2018    History of ataxia    Personal history of other specified conditions 07/22/2016    History of urinary frequency    Personal history of other specified conditions 07/22/2016    History of fever    Personal history of other specified conditions 11/20/2017    History of bradycardia    Vitamin D deficiency, unspecified 09/08/2015    Vitamin D deficiency     Past Surgical History:   Procedure Laterality Date    MANDIBLE SURGERY  11/01/2017    Jaw Surgery    OTHER SURGICAL HISTORY  07/22/2016    Brain Surgery     Allergies   Allergen Reactions    Pollen Extracts Unknown     Current Outpatient Medications   Medication Sig Dispense Refill    aspirin 81 mg EC tablet Take 1 tablet (81 mg) by mouth once daily.      ibuprofen 400 mg tablet Take 1 tablet (400 mg) by mouth 2 times a day. 60 tablet 0    allopurinol (Zyloprim) 300 mg tablet Take 1 tablet (300 mg) by mouth once daily. 90 tablet 3    amLODIPine (Norvasc) 5 mg tablet Take 1 tablet (5 mg) by mouth once  daily. 90 tablet 3    gabapentin (Neurontin) 100 mg capsule Take 1 capsule (100 mg) by mouth 2 times a day. 60 capsule 0    metFORMIN (Glucophage) 500 mg tablet Take 1 tablet (500 mg) by mouth 2 times a day with meals. 180 tablet 3    olmesartan (BENIcar) 40 mg tablet Take 1 tablet (40 mg) by mouth once daily. 90 tablet 3    PARoxetine (PaxiL) 20 mg tablet Take 1 tablet (20 mg) by mouth once daily in the morning. 90 tablet 3    rosuvastatin (Crestor) 10 mg tablet Take 1 tablet (10 mg) by mouth once daily. 90 tablet 3     No current facility-administered medications for this visit.     Family History   Problem Relation Name Age of Onset    Stomach cancer Mother      Stomach cancer Sister      Brain cancer Son       Social History     Socioeconomic History    Marital status:      Spouse name: None    Number of children: None    Years of education: None    Highest education level: None   Occupational History    None   Tobacco Use    Smoking status: Former     Types: Cigarettes    Smokeless tobacco: Never   Substance and Sexual Activity    Alcohol use: Never    Drug use: Never    Sexual activity: None   Other Topics Concern    None   Social History Narrative    None     Social Determinants of Health     Financial Resource Strain: Not on file   Food Insecurity: Not on file   Transportation Needs: Not on file   Physical Activity: Not on file   Stress: Not on file   Social Connections: Not on file   Intimate Partner Violence: Not on file   Housing Stability: Not on file     Immunization History   Administered Date(s) Administered    Influenza, Unspecified 09/02/2015    Influenza, seasonal, injectable 09/01/2022    Moderna COVID-19 vaccine, Fall 2023, 12 yeasrs and older (50mcg/0.5mL) 10/27/2023    Moderna COVID-19 vaccine, bivalent, blue cap/gray label *Check age/dose* 09/29/2022    Moderna SARS-CoV-2 Vaccination 03/10/2021, 04/07/2021, 10/27/2021, 04/22/2022    PPD Test 11/22/2011    Pneumococcal Conjugate PCV 7  "10/09/2018    Pneumococcal conjugate vaccine, 13-valent (PREVNAR 13) 10/09/2018    Pneumococcal polysaccharide vaccine, 23-valent, age 2 years and older (PNEUMOVAX 23) 02/06/2020    Tdap vaccine, age 7 year and older (BOOSTRIX) 10/09/2018    Zoster vaccine, recombinant, adult (SHINGRIX) 05/08/2019, 02/06/2020    Zoster, Unspecified 10/19/2022, 12/21/2022       Review of Systems  Review of systems is otherwise negative unless stated above or in history of present illness.    Objective   Visit Vitals  /84 (BP Location: Left arm, Patient Position: Sitting, BP Cuff Size: Adult)   Pulse 63   Temp 36.5 °C (97.7 °F)   Ht 1.676 m (5' 6\")   Wt 69.3 kg (152 lb 12.8 oz)   SpO2 96%   BMI 24.66 kg/m²   Smoking Status Former   BSA 1.8 m²     Physical Exam  Constitutional: BMI 24     General: not in acute distress.   HENT:      Head: Normocephalic and atraumatic.      Nose: Nose normal.   Eyes:      Extraocular Movements: Extraocular movements intact.      Conjunctiva/sclera: Conjunctivae normal.   Cardiovascular:      Rate and Rhythm: Normal rate ,  No M/R/G  Pulmonary:      Effort: Pulmonary effort is normal.      Breath sounds: Normal, Bilat Equal AE  Skin:     General: Skin is warm.   Neurological: Cervical lumbar radiculopathy     Mental Status: He is alert and oriented to person, place, and time.   Psychiatric:         Mood and Affect: Mood normal.         Behavior: Behavior normal.   Musculoskeletal arthritis of cervical spine FROM in all extremitirs,  Joint-no swelling or tenderness    Orders Only on 07/10/2023   Component Date Value Ref Range Status    Glucose 07/10/2023 107 (H)  74 - 99 mg/dL Final    Sodium 07/10/2023 141  136 - 145 mmol/L Final    Potassium 07/10/2023 4.1  3.5 - 5.3 mmol/L Final    Chloride 07/10/2023 109 (H)  98 - 107 mmol/L Final    Bicarbonate 07/10/2023 23  21 - 32 mmol/L Final    Anion Gap 07/10/2023 13  10 - 20 mmol/L Final    Urea Nitrogen 07/10/2023 18  6 - 23 mg/dL Final    Creatinine " 07/10/2023 0.96  0.50 - 1.30 mg/dL Final    GFR MALE 07/10/2023 85  >90 mL/min/1.73m2 Final    Calcium 07/10/2023 9.5  8.6 - 10.6 mg/dL Final    Albumin 07/10/2023 4.5  3.4 - 5.0 g/dL Final    Alkaline Phosphatase 07/10/2023 77  33 - 136 U/L Final    Total Protein 07/10/2023 7.1  6.4 - 8.2 g/dL Final    AST 07/10/2023 14  9 - 39 U/L Final    Total Bilirubin 07/10/2023 0.8  0.0 - 1.2 mg/dL Final    ALT (SGPT) 07/10/2023 14  10 - 52 U/L Final    Cholesterol 07/10/2023 148  0 - 199 mg/dL Final    HDL 07/10/2023 46.8  mg/dL Final    Cholesterol/HDL Ratio 07/10/2023 3.2   Final    LDL 07/10/2023 68  0 - 99 mg/dL Final    VLDL 07/10/2023 34  0 - 40 mg/dL Final    Triglycerides 07/10/2023 168 (H)  0 - 149 mg/dL Final    WBC 07/10/2023 7.4  4.4 - 11.3 x10E9/L Final    nRBC 07/10/2023 0.0  0.0 - 0.0 /100 WBC Final    RBC 07/10/2023 5.19  4.50 - 5.90 x10E12/L Final    Hemoglobin 07/10/2023 15.3  13.5 - 17.5 g/dL Final    Hematocrit 07/10/2023 47.1  41.0 - 52.0 % Final    MCV 07/10/2023 91  80 - 100 fL Final    MCHC 07/10/2023 32.5  32.0 - 36.0 g/dL Final    Platelets 07/10/2023 291  150 - 450 x10E9/L Final    RDW 07/10/2023 12.8  11.5 - 14.5 % Final    Neutrophils % 07/10/2023 63.0  40.0 - 80.0 % Final    Immature Granulocytes %, Automated 07/10/2023 0.3  0.0 - 0.9 % Final    Lymphocytes % 07/10/2023 28.4  13.0 - 44.0 % Final    Monocytes % 07/10/2023 6.1  2.0 - 10.0 % Final    Eosinophils % 07/10/2023 1.2  0.0 - 6.0 % Final    Basophils % 07/10/2023 1.0  0.0 - 2.0 % Final    Neutrophils Absolute 07/10/2023 4.64  1.20 - 7.70 x10E9/L Final    Lymphocytes Absolute 07/10/2023 2.09  1.20 - 4.80 x10E9/L Final    Monocytes Absolute 07/10/2023 0.45  0.10 - 1.00 x10E9/L Final    Eosinophils Absolute 07/10/2023 0.09  0.00 - 0.70 x10E9/L Final    Basophils Absolute 07/10/2023 0.07  0.00 - 0.10 x10E9/L Final    Hemoglobin A1C 07/10/2023 6.3 (A)  % Final    Estimated Average Glucose 07/10/2023 134  MG/DL Final    Uric Acid 07/10/2023 4.3   4.0 - 7.5 mg/dL Final       Radiology: Reviewed imaging in powerchart.  No results found.      Charting was completed using voice recognition technology and may include unintended errors.        Universal Safety Interventions

## 2023-11-02 ENCOUNTER — TELEPHONE (OUTPATIENT)
Dept: PRIMARY CARE | Facility: CLINIC | Age: 68
End: 2023-11-02
Payer: MEDICARE

## 2023-11-02 NOTE — TELEPHONE ENCOUNTER
----- Message from Nichole Last MA sent at 11/1/2023  3:21 PM EDT -----    ----- Message -----  From: Lin Barbosa MD  Sent: 10/31/2023   9:55 AM EDT  To: Vivian Herron MA    Hemoglobin A1c 6.4 triglyceride 165 glucose 106 low-fat low-carb diet follow-up 3 months

## 2023-12-18 DIAGNOSIS — E11.59 HYPERTENSION ASSOCIATED WITH DIABETES (MULTI): ICD-10-CM

## 2023-12-18 DIAGNOSIS — M19.90 ARTHRITIS: ICD-10-CM

## 2023-12-18 DIAGNOSIS — I15.2 HYPERTENSION ASSOCIATED WITH DIABETES (MULTI): ICD-10-CM

## 2023-12-18 RX ORDER — GABAPENTIN 100 MG/1
100 CAPSULE ORAL 2 TIMES DAILY
Qty: 60 CAPSULE | Refills: 3 | Status: SHIPPED | OUTPATIENT
Start: 2023-12-18 | End: 2024-03-25

## 2023-12-18 RX ORDER — OLMESARTAN MEDOXOMIL 40 MG/1
40 TABLET ORAL DAILY
Qty: 90 TABLET | Refills: 3 | Status: SHIPPED | OUTPATIENT
Start: 2023-12-18

## 2024-02-26 ENCOUNTER — OFFICE VISIT (OUTPATIENT)
Dept: PRIMARY CARE | Facility: CLINIC | Age: 69
End: 2024-02-26
Payer: COMMERCIAL

## 2024-02-26 VITALS
OXYGEN SATURATION: 97 % | TEMPERATURE: 96.5 F | WEIGHT: 147.8 LBS | HEIGHT: 66 IN | SYSTOLIC BLOOD PRESSURE: 118 MMHG | HEART RATE: 58 BPM | DIASTOLIC BLOOD PRESSURE: 72 MMHG | BODY MASS INDEX: 23.75 KG/M2

## 2024-02-26 DIAGNOSIS — F32.A ANXIETY AND DEPRESSION: ICD-10-CM

## 2024-02-26 DIAGNOSIS — Z00.00 MEDICARE ANNUAL WELLNESS VISIT, SUBSEQUENT: Primary | ICD-10-CM

## 2024-02-26 DIAGNOSIS — D49.6: ICD-10-CM

## 2024-02-26 DIAGNOSIS — M1A.2710 DRUG-INDUCED CHRONIC GOUT OF RIGHT FOOT WITHOUT TOPHUS: ICD-10-CM

## 2024-02-26 DIAGNOSIS — I15.2 HYPERTENSION ASSOCIATED WITH DIABETES (MULTI): ICD-10-CM

## 2024-02-26 DIAGNOSIS — N40.0 BENIGN PROSTATIC HYPERPLASIA WITHOUT LOWER URINARY TRACT SYMPTOMS: ICD-10-CM

## 2024-02-26 DIAGNOSIS — E11.8 DIABETES MELLITUS WITH COMPLICATION IN ADULT PATIENT (MULTI): ICD-10-CM

## 2024-02-26 DIAGNOSIS — E11.59 HYPERTENSION ASSOCIATED WITH DIABETES (MULTI): ICD-10-CM

## 2024-02-26 DIAGNOSIS — F41.9 ANXIETY AND DEPRESSION: ICD-10-CM

## 2024-02-26 PROCEDURE — 99497 ADVNCD CARE PLAN 30 MIN: CPT | Performed by: INTERNAL MEDICINE

## 2024-02-26 PROCEDURE — 1157F ADVNC CARE PLAN IN RCRD: CPT | Performed by: INTERNAL MEDICINE

## 2024-02-26 PROCEDURE — 1125F AMNT PAIN NOTED PAIN PRSNT: CPT | Performed by: INTERNAL MEDICINE

## 2024-02-26 PROCEDURE — 1036F TOBACCO NON-USER: CPT | Performed by: INTERNAL MEDICINE

## 2024-02-26 PROCEDURE — 1160F RVW MEDS BY RX/DR IN RCRD: CPT | Performed by: INTERNAL MEDICINE

## 2024-02-26 PROCEDURE — 3074F SYST BP LT 130 MM HG: CPT | Performed by: INTERNAL MEDICINE

## 2024-02-26 PROCEDURE — 3078F DIAST BP <80 MM HG: CPT | Performed by: INTERNAL MEDICINE

## 2024-02-26 PROCEDURE — G0439 PPPS, SUBSEQ VISIT: HCPCS | Performed by: INTERNAL MEDICINE

## 2024-02-26 PROCEDURE — 4010F ACE/ARB THERAPY RXD/TAKEN: CPT | Performed by: INTERNAL MEDICINE

## 2024-02-26 PROCEDURE — 1159F MED LIST DOCD IN RCRD: CPT | Performed by: INTERNAL MEDICINE

## 2024-02-26 PROCEDURE — 1170F FXNL STATUS ASSESSED: CPT | Performed by: INTERNAL MEDICINE

## 2024-02-26 ASSESSMENT — PATIENT HEALTH QUESTIONNAIRE - PHQ9
2. FEELING DOWN, DEPRESSED OR HOPELESS: NOT AT ALL
2. FEELING DOWN, DEPRESSED OR HOPELESS: NOT AT ALL
SUM OF ALL RESPONSES TO PHQ9 QUESTIONS 1 AND 2: 0
1. LITTLE INTEREST OR PLEASURE IN DOING THINGS: NOT AT ALL

## 2024-02-26 ASSESSMENT — ACTIVITIES OF DAILY LIVING (ADL)
GROCERY_SHOPPING: INDEPENDENT
MANAGING_FINANCES: INDEPENDENT
DOING_HOUSEWORK: INDEPENDENT
DRESSING: INDEPENDENT
BATHING: INDEPENDENT
TAKING_MEDICATION: INDEPENDENT

## 2024-02-26 NOTE — PROGRESS NOTES
Assessment and Plan:  Problem List Items Addressed This Visit       Brain tumor, recurrent (CMS/HCC)     Neurology follow-up once a year         Relevant Orders    Albumin , Urine Random    CBC and Auto Differential    Comprehensive Metabolic Panel    Hemoglobin A1C    Lipid Panel    Prostate Specific Antigen, Screen    TSH with reflex to Free T4 if abnormal    Uric Acid    Microscopic Only, Urine    CK    Diabetes mellitus with complication in adult patient (CMS/[facility])    Relevant Orders    Albumin , Urine Random    CBC and Auto Differential    Comprehensive Metabolic Panel    Hemoglobin A1C    Lipid Panel    Prostate Specific Antigen, Screen    TSH with reflex to Free T4 if abnormal    Uric Acid    Microscopic Only, Urine    CK    Gout    Relevant Orders    Albumin , Urine Random    CBC and Auto Differential    Comprehensive Metabolic Panel    Hemoglobin A1C    Lipid Panel    Prostate Specific Antigen, Screen    TSH with reflex to Free T4 if abnormal    Uric Acid    Microscopic Only, Urine    CK    Hypertension associated with diabetes (CMS/[facility])     Patients BP readings reviewed and addressed, as we age our arteries turn stiffer and less elastic. Restricting salt consumption and staying physically fit with regular exercise regimen is the only way to keep our vasculature less tonic. Studies have shown that keeping ideal body wt, exercise routine about 140 to 150 minutes a week, eating variety of plant based diet and drinking plentiful water are quite helpful. Monitor BP twice or once a week at home and bring log to be reviewed by me. Uncontrolled BP has long term consequences including heart failure, myocardial infarction, accelerated atherosclerosis and kidney dysfunction. Therapy reviewed and explained.           Relevant Orders    Albumin , Urine Random    CBC and Auto Differential    Comprehensive Metabolic Panel    Hemoglobin A1C    Lipid Panel    Prostate Specific Antigen, Screen    TSH with reflex to Free T4  if abnormal    Uric Acid    Microscopic Only, Urine    CK    Anxiety and depression     Depression is chronic and quite common and notorious mental health disorder and it is quite common and widespread, there are several therapeutics available for depression now a days. It is considered as chemical imbalance disorder and with medications , it can be adjusted. There are side effects from SSRI and SNRIs but on long term, they are well tolerated. Please do not feel awkward or shy to contact us if feel tired, sleepy, lack of energy or aloof/ alone. Untreated depression can bring serious consequences including suicidal ideations, mental health counselling is available if need arises. Usually treatment of depression is long lasting therapy with periodic evaluations and follow ups. Pt with depression no longer have to feel frustrated, helpless or isolated.Detailed discussion was carried out.           Medicare annual wellness visit, subsequent - Primary    Relevant Orders    Albumin , Urine Random    CBC and Auto Differential    Comprehensive Metabolic Panel    Hemoglobin A1C    Lipid Panel    Prostate Specific Antigen, Screen    TSH with reflex to Free T4 if abnormal    Uric Acid    Microscopic Only, Urine    CK    Benign prostatic hyperplasia without lower urinary tract symptoms    Relevant Orders    Prostate Specific Antigen, Screen         Chief Complaint:   Annual Medicare Wellness Office Exam/Comprehensive Problem Focused Follow Up and Physical Exam      HPI: 68-year-old patient  with 2 children    2 grandkids    Brother have hypertension    Sister have diabetes    Mother lived to 91    Father  from the stomach cancer    Personal history of brain tumor status post surgery hypertension hyperlipidemia retinopathy neuropathy nephropathy proteinuria osteoarthritis gouty other anxiety with depression    Complaining and associated arthralgia myalgia fatigue tingling numbness    Erectile dysfunction    Negative  for headache visual or speech problem    Negative for hematuria rectal bleeding    Negative for suicide or dementia    Negative for fall negative for COVID        Patient Active Problem List:  Patient Active Problem List   Diagnosis    Brain tumor, recurrent (CMS/HCC)    Diabetes mellitus with complication in adult patient (CMS/HCC)    Erectile dysfunction    Gout    Hyperlipidemia associated with type 2 diabetes mellitus (CMS/HCC)    Hypertension associated with diabetes (CMS/[facility])    Anxiety and depression    Medicare annual wellness visit, subsequent    Benign prostatic hyperplasia without lower urinary tract symptoms          Comprehensive Medical/Surgical/Social/Family History:  Family History   Problem Relation Name Age of Onset    Stomach cancer Mother      Stomach cancer Sister      Brain cancer Son         Past Medical History:   Diagnosis Date    Benign prostatic hyperplasia without lower urinary tract symptoms 05/25/2021    Enlarged prostate without lower urinary tract symptoms (luts)    Encounter for screening for malignant neoplasm of respiratory organs 05/02/2022    Encounter for screening for lung cancer    Essential (primary) hypertension 05/13/2021    Benign essential hypertension    Generalized anxiety disorder 05/25/2021    VIVEK (generalized anxiety disorder)    Mixed hyperlipidemia 05/13/2021    Hyperlipidemia, mixed    Other abnormal glucose     Elevated glucose    Other conditions influencing health status     Postoperative seroma    Other conditions influencing health status 02/05/2018    History of cough    Other specified soft tissue disorders 12/14/2017    Soft tissue mass    Pain in left knee 05/30/2017    Left knee pain    Personal history of benign neoplasm of the brain 05/13/2021    History of benign neoplasm of brain    Personal history of neoplasm of uncertain behavior 05/07/2018    History of neoplasm of uncertain behavior    Personal history of other diseases of the circulatory system  05/08/2019    History of hypertension    Personal history of other diseases of the digestive system 05/02/2022    History of fatty infiltration of liver    Personal history of other diseases of the musculoskeletal system and connective tissue 07/05/2022    History of polymyalgia rheumatica    Personal history of other diseases of the musculoskeletal system and connective tissue 11/04/2021    History of gout    Personal history of other diseases of the respiratory system 05/02/2022    History of sinusitis    Personal history of other diseases of the respiratory system 02/05/2018    History of upper respiratory infection    Personal history of other endocrine, nutritional and metabolic disease 11/04/2021    History of diabetes mellitus    Personal history of other endocrine, nutritional and metabolic disease 05/08/2019    History of hyperlipidemia    Personal history of other endocrine, nutritional and metabolic disease 05/25/2021    History of vitamin D deficiency    Personal history of other specified conditions 02/13/2018    History of ataxia    Personal history of other specified conditions 07/22/2016    History of urinary frequency    Personal history of other specified conditions 07/22/2016    History of fever    Personal history of other specified conditions 11/20/2017    History of bradycardia    Vitamin D deficiency, unspecified 09/08/2015    Vitamin D deficiency       Past Surgical History:   Procedure Laterality Date    MANDIBLE SURGERY  11/01/2017    Jaw Surgery    OTHER SURGICAL HISTORY  07/22/2016    Brain Surgery       Social History     Socioeconomic History    Marital status:      Spouse name: None    Number of children: None    Years of education: None    Highest education level: None   Occupational History    None   Tobacco Use    Smoking status: Former     Types: Cigarettes    Smokeless tobacco: Never   Substance and Sexual Activity    Alcohol use: Never    Drug use: Never    Sexual  activity: None   Other Topics Concern    None   Social History Narrative    None     Social Determinants of Health     Financial Resource Strain: Not on file   Food Insecurity: Not on file   Transportation Needs: Not on file   Physical Activity: Not on file   Stress: Not on file   Social Connections: Not on file   Intimate Partner Violence: Not on file   Housing Stability: Not on file       Tobacco/Alcohol/Opioid use, as well as Illicit Drug Use was screened for/reviewed and documented in Social Documentation section of the chart and medication list as appropriate    Allergies and Medications  Allergies   Allergen Reactions    Pollen Extracts Unknown     Current Outpatient Medications   Medication Sig Dispense Refill    allopurinol (Zyloprim) 300 mg tablet Take 1 tablet (300 mg) by mouth once daily. 90 tablet 3    amLODIPine (Norvasc) 5 mg tablet Take 1 tablet (5 mg) by mouth once daily. 90 tablet 3    aspirin 81 mg EC tablet Take 1 tablet (81 mg) by mouth once daily.      gabapentin (Neurontin) 100 mg capsule TAKE ONE CAPSULE BY MOUTH TWICE A DAY 60 capsule 3    ibuprofen 400 mg tablet Take 1 tablet (400 mg) by mouth 2 times a day. 60 tablet 0    metFORMIN (Glucophage) 500 mg tablet Take 1 tablet (500 mg) by mouth 2 times a day with meals. 180 tablet 3    olmesartan (BENIcar) 40 mg tablet TAKE ONE TABLET BY MOUTH EVERY DAY 90 tablet 3    PARoxetine (PaxiL) 20 mg tablet Take 1 tablet (20 mg) by mouth once daily in the morning. 90 tablet 3    rosuvastatin (Crestor) 10 mg tablet Take 1 tablet (10 mg) by mouth once daily. 90 tablet 3     No current facility-administered medications for this visit.       Medications and Supplements  prescribed by me and other practitioners or clinical pharmacist (such as prescriptions, OTC's, herbal therapies and supplements) were reviewed and documented in the medical record.     Advance directives  Advanced Care Planning (including a Living Will, Healthcare POA, as well as specific  "end of life choices and/or directives), was discussed for approximately 16 minutes with the patient and/or surrogate, voluntarily, and documented in the Problem List of the medical record.     Cardiac Risk Assessment  Cardiovascular risk was discussed and, if needed, lifestyle modifications recommended, including nutritional choices, exercise, and elimination of habits contributing to risk. We agreed on a plan to reduce the current cardiovascular risk based on above discussion as needed.  Aspirin use/disuse was discussed and documented in the Problem List of the medical record after reviewing the updated guidelines below:    Consider low dose Aspirin ( mg) use if the benefit for cardiovascular disease prevention outweighs risk for bleeding complications.   In general, low dose ASA should be considered:  In patients WITHOUT prior MI/stroke/PAD (primary prevention):   a. Age <60: Use if 10-year cardiovascular disease risk >20%, with discussion of risks and benefits with patient  b. Age 60-<70: Use if 10-year cardiovascular disease risk >20% and low bleeding (e.g., gastrointenstinal) risk, with discussion of risks and benefits with patient  c. Age >=70: Do not use    In patients WITH prior MI/stroke/PAD (secondary prevention):   Generally use unless extremely high bleeding (e.g., gastrointenstinal) risk, with discussion of risks and benefits with patient    ROS otherwise negative aside from what was mentioned above in HPI.    Visit Vitals  /72 (BP Location: Left arm, Patient Position: Sitting, BP Cuff Size: Adult)   Pulse 58   Temp 35.8 °C (96.5 °F)   Ht 1.676 m (5' 6\")   Wt 67 kg (147 lb 12.8 oz)   SpO2 97%   BMI 23.86 kg/m²   Smoking Status Former   BSA 1.77 m²       Physical Exam  Physical Exam:    Appearance: Alert, oriented , cooperative,  in no acute distress. Well nourished & well hydrated.    Skin: Intact,  dry skin, no lesions, rash, petechiae or purpura.     Eyes: Eyeglasses    ENT: Mild nystagmus " minimal wax    Neck: Supple, without meningismus. Thyroid not palpable. Trachea at midline. No lymphadenopathy.    Pulmonary: Basal crackles  Cardiac: Normal S1, S2 without murmur, rub, gallop or extrasystole. No JVD, Carotids without bruits.    Abdomen: Soft, nontender, active bowel sounds.  No palpable organomegaly.  No rebound or guarding.  No CVA tenderness.    Genitourinary: FOBT negative BPH   musculoskeletal: Arthritis of ankle  Neurological: Nonspecific tingling numbness cervical  Psychiatric: Appropriate mood and affect.         During the course of the visit the patient was educated and counseled about age appropriate screening and preventive services. Completed preventive screenings were documented in the chart and orders were placed for outstanding screenings/procedures as documented in the Assessment and Plan.    Patient Instructions (the written plan) was given to the patient at check out.    Charting was completed using voice recognition technology and may include unintended errors.

## 2024-02-27 ENCOUNTER — TELEPHONE (OUTPATIENT)
Dept: PRIMARY CARE | Facility: CLINIC | Age: 69
End: 2024-02-27
Payer: COMMERCIAL

## 2024-02-27 DIAGNOSIS — R50.9 FEVER, UNSPECIFIED FEVER CAUSE: ICD-10-CM

## 2024-02-27 RX ORDER — AZITHROMYCIN 250 MG/1
TABLET, FILM COATED ORAL
Qty: 6 TABLET | Refills: 0 | Status: SHIPPED | OUTPATIENT
Start: 2024-02-27 | End: 2024-03-03

## 2024-02-27 RX ORDER — METHYLPREDNISOLONE 4 MG/1
TABLET ORAL
Qty: 21 TABLET | Refills: 0 | Status: SHIPPED | OUTPATIENT
Start: 2024-02-27 | End: 2024-03-05

## 2024-02-27 NOTE — TELEPHONE ENCOUNTER
PT was in yesterday for a follow up visit and he felt bad, but today is much worse.     Fever  Chills  Body aches  Runny nose

## 2024-03-04 ENCOUNTER — TELEPHONE (OUTPATIENT)
Dept: PRIMARY CARE | Facility: CLINIC | Age: 69
End: 2024-03-04
Payer: COMMERCIAL

## 2024-03-04 DIAGNOSIS — R05.9 COUGH, UNSPECIFIED TYPE: ICD-10-CM

## 2024-03-04 RX ORDER — AMOXICILLIN 500 MG/1
500 CAPSULE ORAL 3 TIMES DAILY
Qty: 30 CAPSULE | Refills: 0 | Status: SHIPPED | OUTPATIENT
Start: 2024-03-04 | End: 2024-03-14

## 2024-03-04 NOTE — TELEPHONE ENCOUNTER
PATIENT WAS SEEN FOR A SCORE THROAT AND SICKNESS THE MED THAT WAS PRESCRIBED ISNT WORKING  HE IS ASKING FOR SOMETHING ELSE TO BE SENT TO THE PHARMACY  [facility]

## 2024-03-25 DIAGNOSIS — M19.90 ARTHRITIS: ICD-10-CM

## 2024-03-25 RX ORDER — GABAPENTIN 100 MG/1
CAPSULE ORAL
Qty: 60 CAPSULE | Refills: 0 | Status: SHIPPED | OUTPATIENT
Start: 2024-03-25 | End: 2024-06-04

## 2024-03-26 ENCOUNTER — OFFICE VISIT (OUTPATIENT)
Dept: PRIMARY CARE | Facility: CLINIC | Age: 69
End: 2024-03-26
Payer: COMMERCIAL

## 2024-03-26 VITALS
HEIGHT: 66 IN | DIASTOLIC BLOOD PRESSURE: 70 MMHG | SYSTOLIC BLOOD PRESSURE: 114 MMHG | WEIGHT: 149 LBS | OXYGEN SATURATION: 96 % | HEART RATE: 82 BPM | BODY MASS INDEX: 23.95 KG/M2 | TEMPERATURE: 97.1 F

## 2024-03-26 DIAGNOSIS — E11.59 HYPERTENSION ASSOCIATED WITH DIABETES (MULTI): ICD-10-CM

## 2024-03-26 DIAGNOSIS — K64.2 GRADE III HEMORRHOIDS: ICD-10-CM

## 2024-03-26 DIAGNOSIS — E11.8 DIABETES MELLITUS WITH COMPLICATION IN ADULT PATIENT (MULTI): ICD-10-CM

## 2024-03-26 DIAGNOSIS — E11.69 HYPERLIPIDEMIA ASSOCIATED WITH TYPE 2 DIABETES MELLITUS (MULTI): ICD-10-CM

## 2024-03-26 DIAGNOSIS — I15.2 HYPERTENSION ASSOCIATED WITH DIABETES (MULTI): ICD-10-CM

## 2024-03-26 DIAGNOSIS — J18.9 COMMUNITY ACQUIRED PNEUMONIA OF RIGHT MIDDLE LOBE OF LUNG: Primary | ICD-10-CM

## 2024-03-26 DIAGNOSIS — E78.5 HYPERLIPIDEMIA ASSOCIATED WITH TYPE 2 DIABETES MELLITUS (MULTI): ICD-10-CM

## 2024-03-26 PROBLEM — N52.9 ERECTILE DYSFUNCTION: Status: RESOLVED | Noted: 2023-03-09 | Resolved: 2024-03-26

## 2024-03-26 PROBLEM — Z00.00 MEDICARE ANNUAL WELLNESS VISIT, SUBSEQUENT: Status: RESOLVED | Noted: 2023-07-31 | Resolved: 2024-03-26

## 2024-03-26 PROBLEM — N40.0 BENIGN PROSTATIC HYPERPLASIA WITHOUT LOWER URINARY TRACT SYMPTOMS: Status: RESOLVED | Noted: 2023-07-31 | Resolved: 2024-03-26

## 2024-03-26 LAB
NON-UH HIE BASO COUNT: 0.06 X1000 (ref 0–0.2)
NON-UH HIE BASOS %: 0.7 %
NON-UH HIE BUN/CREAT RATIO: 18.9
NON-UH HIE BUN: 17 MG/DL (ref 9–23)
NON-UH HIE CALCIUM: 9 MG/DL (ref 8.7–10.4)
NON-UH HIE CALCULATED OSMOLALITY: 282 MOSM/KG (ref 275–295)
NON-UH HIE CHLORIDE: 109 MMOL/L (ref 98–107)
NON-UH HIE CO2, VENOUS: 23 MMOL/L (ref 20–31)
NON-UH HIE CREATININE: 0.9 MG/DL (ref 0.6–1.1)
NON-UH HIE DIFF?: NO
NON-UH HIE EOS COUNT: 0.16 X1000 (ref 0–0.5)
NON-UH HIE EOSIN %: 2 %
NON-UH HIE GFR AA: >60
NON-UH HIE GLOMERULAR FILTRATION RATE: >60 ML/MIN/1.73M?
NON-UH HIE GLUCOSE: 119 MG/DL (ref 74–106)
NON-UH HIE HCT: 38.4 % (ref 41–52)
NON-UH HIE HGB: 12.7 G/DL (ref 13.5–17.5)
NON-UH HIE INSTR WBC: 7.7
NON-UH HIE K: 4 MMOL/L (ref 3.5–5.1)
NON-UH HIE LYMPH %: 28.1 %
NON-UH HIE LYMPH COUNT: 2.15 X1000 (ref 1.2–4.8)
NON-UH HIE MCH: 28.4 PG (ref 27–34)
NON-UH HIE MCHC: 33 G/DL (ref 32–37)
NON-UH HIE MCV: 86.1 FL (ref 80–100)
NON-UH HIE MONO %: 6.5 %
NON-UH HIE MONO COUNT: 0.5 X1000 (ref 0.1–1)
NON-UH HIE MPV: 6.7 FL (ref 7.4–10.4)
NON-UH HIE NA: 140 MMOL/L (ref 135–145)
NON-UH HIE NEUTROPHIL %: 62.6 %
NON-UH HIE NEUTROPHIL COUNT (ANC): 4.81 X1000 (ref 1.4–8.8)
NON-UH HIE NUCLEATED RBC: 0 /100WBC
NON-UH HIE PLATELET: 296 X10 (ref 150–450)
NON-UH HIE RBC: 4.46 X10 (ref 4.7–6.1)
NON-UH HIE RDW: 14.3 % (ref 11.5–14.5)
NON-UH HIE WBC: 7.7 X10 (ref 4.5–11)

## 2024-03-26 PROCEDURE — 3074F SYST BP LT 130 MM HG: CPT | Performed by: INTERNAL MEDICINE

## 2024-03-26 PROCEDURE — 4010F ACE/ARB THERAPY RXD/TAKEN: CPT | Performed by: INTERNAL MEDICINE

## 2024-03-26 PROCEDURE — 96372 THER/PROPH/DIAG INJ SC/IM: CPT | Performed by: INTERNAL MEDICINE

## 2024-03-26 PROCEDURE — 99214 OFFICE O/P EST MOD 30 MIN: CPT | Performed by: INTERNAL MEDICINE

## 2024-03-26 PROCEDURE — 3078F DIAST BP <80 MM HG: CPT | Performed by: INTERNAL MEDICINE

## 2024-03-26 PROCEDURE — 1159F MED LIST DOCD IN RCRD: CPT | Performed by: INTERNAL MEDICINE

## 2024-03-26 PROCEDURE — 1157F ADVNC CARE PLAN IN RCRD: CPT | Performed by: INTERNAL MEDICINE

## 2024-03-26 PROCEDURE — 1036F TOBACCO NON-USER: CPT | Performed by: INTERNAL MEDICINE

## 2024-03-26 PROCEDURE — 1160F RVW MEDS BY RX/DR IN RCRD: CPT | Performed by: INTERNAL MEDICINE

## 2024-03-26 RX ORDER — PANTOPRAZOLE SODIUM 40 MG/1
40 TABLET, DELAYED RELEASE ORAL 2 TIMES DAILY
Qty: 30 TABLET | Refills: 0 | Status: SHIPPED
Start: 2024-03-26 | End: 2024-05-28 | Stop reason: ALTCHOICE

## 2024-03-26 RX ORDER — LEVOFLOXACIN 500 MG/1
500 TABLET, FILM COATED ORAL DAILY
Qty: 10 TABLET | Refills: 0 | Status: SHIPPED
Start: 2024-03-26 | End: 2024-04-04 | Stop reason: ALTCHOICE

## 2024-03-26 RX ORDER — CEFTRIAXONE 1 G/1
1 INJECTION, POWDER, FOR SOLUTION INTRAMUSCULAR; INTRAVENOUS ONCE
Status: COMPLETED | OUTPATIENT
Start: 2024-03-26 | End: 2024-03-26

## 2024-03-26 RX ADMIN — CEFTRIAXONE 1 G: 1 INJECTION, POWDER, FOR SOLUTION INTRAMUSCULAR; INTRAVENOUS at 15:44

## 2024-03-26 NOTE — ASSESSMENT & PLAN NOTE
Given Rocephin 1 g intramuscular Levaquin 500 milligram 1 a day for 10 days probiotic 1 a day for 10 days sent for the CAT scan of the chest without contrast-CBC with differential for problems continue does not get any better go to the emergency room patient advised to hold ibuprofen metformin and aspirin at least for 1 week

## 2024-03-26 NOTE — PROGRESS NOTES
Subjective   Patient ID: Fortunato Romano is a 68 y.o. male who presents for Shortness of Breath, Cough (Coughing up blood ), Fatigue, and Rectal Bleeding.    Assessment/Plan     Problem List Items Addressed This Visit       Diabetes mellitus with complication in adult patient (CMS/HCC)     Because of infection monitor blood sugar very closely         Hyperlipidemia associated with type 2 diabetes mellitus (CMS/HCC)    Hypertension associated with diabetes (CMS/HCC)    Community acquired pneumonia of right middle lobe of lung - Primary     Given Rocephin 1 g intramuscular Levaquin 500 milligram 1 a day for 10 days probiotic 1 a day for 10 days sent for the CAT scan of the chest without contrast-CBC with differential for problems continue does not get any better go to the emergency room patient advised to hold ibuprofen metformin and aspirin at least for 1 week         Relevant Orders    CBC and Auto Differential    Comprehensive Metabolic Panel    CT chest wo IV contrast    Grade III hemorrhoids       HPI this is a 68-year-old patient had a cough congestion shortness of breath coughing up blood fatigue reactivation of hemorrhoid onset acutely duration 2 weeks progressed acutely went to urgent care given antibiotic does not get any better    Negative for DVT or PE    History of the brain tumor    Patient taking aspirin and ibuprofen giving him a stomach pain and coughing spell    Advised to stop aspirin and ibuprofen    Sent for the CBC BMP CMP CAT scan of the chest patient have a chest x-ray was done negative    Continue have a low-grade fever chills associated with the cough congestions and shortness of breath    Negative for hypoxia    Negative for loss of taste or smell or COVID-19    Patient will hold aspirin ibuprofen and Glucophage at least for 1 week  Past Medical History:   Diagnosis Date    Benign prostatic hyperplasia without lower urinary tract symptoms 05/25/2021    Enlarged prostate without lower urinary  tract symptoms (luts)    Encounter for screening for malignant neoplasm of respiratory organs 05/02/2022    Encounter for screening for lung cancer    Essential (primary) hypertension 05/13/2021    Benign essential hypertension    Generalized anxiety disorder 05/25/2021    VIVEK (generalized anxiety disorder)    Mixed hyperlipidemia 05/13/2021    Hyperlipidemia, mixed    Other abnormal glucose     Elevated glucose    Other conditions influencing health status     Postoperative seroma    Other conditions influencing health status 02/05/2018    History of cough    Other specified soft tissue disorders 12/14/2017    Soft tissue mass    Pain in left knee 05/30/2017    Left knee pain    Personal history of benign neoplasm of the brain 05/13/2021    History of benign neoplasm of brain    Personal history of neoplasm of uncertain behavior 05/07/2018    History of neoplasm of uncertain behavior    Personal history of other diseases of the circulatory system 05/08/2019    History of hypertension    Personal history of other diseases of the digestive system 05/02/2022    History of fatty infiltration of liver    Personal history of other diseases of the musculoskeletal system and connective tissue 07/05/2022    History of polymyalgia rheumatica    Personal history of other diseases of the musculoskeletal system and connective tissue 11/04/2021    History of gout    Personal history of other diseases of the respiratory system 05/02/2022    History of sinusitis    Personal history of other diseases of the respiratory system 02/05/2018    History of upper respiratory infection    Personal history of other endocrine, nutritional and metabolic disease 11/04/2021    History of diabetes mellitus    Personal history of other endocrine, nutritional and metabolic disease 05/08/2019    History of hyperlipidemia    Personal history of other endocrine, nutritional and metabolic disease 05/25/2021    History of vitamin D deficiency     Personal history of other specified conditions 02/13/2018    History of ataxia    Personal history of other specified conditions 07/22/2016    History of urinary frequency    Personal history of other specified conditions 07/22/2016    History of fever    Personal history of other specified conditions 11/20/2017    History of bradycardia    Vitamin D deficiency, unspecified 09/08/2015    Vitamin D deficiency     Past Surgical History:   Procedure Laterality Date    MANDIBLE SURGERY  11/01/2017    Jaw Surgery    OTHER SURGICAL HISTORY  07/22/2016    Brain Surgery     Allergies   Allergen Reactions    Pollen Extracts Unknown     Current Outpatient Medications   Medication Sig Dispense Refill    allopurinol (Zyloprim) 300 mg tablet Take 1 tablet (300 mg) by mouth once daily. 90 tablet 3    amLODIPine (Norvasc) 5 mg tablet Take 1 tablet (5 mg) by mouth once daily. 90 tablet 3    aspirin 81 mg EC tablet Take 1 tablet (81 mg) by mouth once daily.      gabapentin (Neurontin) 100 mg capsule TAKE 1 CAPSULE (100 MG) BY MOUTH TWO TIMES DAILY 60 capsule 0    ibuprofen 400 mg tablet Take 1 tablet (400 mg) by mouth 2 times a day. 60 tablet 0    metFORMIN (Glucophage) 500 mg tablet Take 1 tablet (500 mg) by mouth 2 times a day with meals. 180 tablet 3    olmesartan (BENIcar) 40 mg tablet TAKE ONE TABLET BY MOUTH EVERY DAY 90 tablet 3    PARoxetine (PaxiL) 20 mg tablet Take 1 tablet (20 mg) by mouth once daily in the morning. 90 tablet 3    rosuvastatin (Crestor) 10 mg tablet Take 1 tablet (10 mg) by mouth once daily. 90 tablet 3     No current facility-administered medications for this visit.     Family History   Problem Relation Name Age of Onset    Stomach cancer Mother      Stomach cancer Sister      Brain cancer Son       Social History     Socioeconomic History    Marital status:      Spouse name: None    Number of children: None    Years of education: None    Highest education level: None   Occupational History     "None   Tobacco Use    Smoking status: Former     Types: Cigarettes    Smokeless tobacco: Never   Substance and Sexual Activity    Alcohol use: Never    Drug use: Never    Sexual activity: None   Other Topics Concern    None   Social History Narrative    None     Social Determinants of Health     Financial Resource Strain: Not on file   Food Insecurity: Not on file   Transportation Needs: Not on file   Physical Activity: Not on file   Stress: Not on file   Social Connections: Not on file   Intimate Partner Violence: Not on file   Housing Stability: Not on file     Immunization History   Administered Date(s) Administered    Influenza, Unspecified 09/02/2015    Influenza, seasonal, injectable 09/01/2022    Moderna COVID-19 vaccine, Fall 2023, 12 yeasrs and older (50mcg/0.5mL) 10/27/2023    Moderna COVID-19 vaccine, bivalent, blue cap/gray label *Check age/dose* 09/29/2022    Moderna SARS-CoV-2 Vaccination 03/10/2021, 04/07/2021, 10/27/2021, 04/22/2022    PPD Test 11/22/2011    Pneumococcal Conjugate PCV 7 10/09/2018    Pneumococcal conjugate vaccine, 13-valent (PREVNAR 13) 10/09/2018    Pneumococcal polysaccharide vaccine, 23-valent, age 2 years and older (PNEUMOVAX 23) 02/06/2020    RSV, 60 Years And Older (AREXVY) 01/12/2024    Tdap vaccine, age 7 year and older (BOOSTRIX, ADACEL) 10/09/2018    Zoster vaccine, recombinant, adult (SHINGRIX) 05/08/2019, 02/06/2020    Zoster, Unspecified 10/19/2022, 12/21/2022       Review of Systems  Review of systems is otherwise negative unless stated above or in history of present illness.    Objective   Visit Vitals  /70 (BP Location: Left arm, Patient Position: Sitting, BP Cuff Size: Adult)   Pulse 82   Temp 36.2 °C (97.1 °F)   Ht 1.676 m (5' 6\")   Wt 67.6 kg (149 lb)   SpO2 96%   BMI 24.05 kg/m²   Smoking Status Former   BSA 1.77 m²     Physical Exam  Constitutional:       General: not in acute distress.   HENT:      Head: Normocephalic and atraumatic.      Nose: Nose " normal.   Eyes: No jaundice     Extraocular Movements: Extraocular movements intact.      Conjunctiva/sclera: Conjunctivae normal.   Cardiovascular: Heart murmur     Rate and Rhythm: Normal rate ,  No M/R/G  Pulmonary: Decreased air entry crackles rales rhonchi more on the right compared to left       Skin:     General: Skin is warm.   Neurological:      Mental Status: He is alert and oriented to person, place, and time.   Psychiatric:         Mood and Affect: Mood normal.         Behavior: Behavior normal.   Musculoskeletal   FROM in all extremitirs,  Joint-no swelling or tenderness  GI epigastric tenderness rectal internal/external hemorrhoid    No visits with results within 4 Month(s) from this visit.   Latest known visit with results is:   Lab on 10/30/2023   Component Date Value Ref Range Status    WBC 10/30/2023 6.2  4.4 - 11.3 x10*3/uL Final    nRBC 10/30/2023 0.0  0.0 - 0.0 /100 WBCs Final    RBC 10/30/2023 5.38  4.50 - 5.90 x10*6/uL Final    Hemoglobin 10/30/2023 15.4  13.5 - 17.5 g/dL Final    Hematocrit 10/30/2023 47.4  41.0 - 52.0 % Final    MCV 10/30/2023 88  80 - 100 fL Final    MCH 10/30/2023 28.6  26.0 - 34.0 pg Final    MCHC 10/30/2023 32.5  32.0 - 36.0 g/dL Final    RDW 10/30/2023 12.9  11.5 - 14.5 % Final    Platelets 10/30/2023 284  150 - 450 x10*3/uL Final    MPV 10/30/2023 9.6  7.5 - 11.5 fL Final    Neutrophils % 10/30/2023 55.1  40.0 - 80.0 % Final    Immature Granulocytes %, Automated 10/30/2023 0.3  0.0 - 0.9 % Final    Lymphocytes % 10/30/2023 32.8  13.0 - 44.0 % Final    Monocytes % 10/30/2023 9.7  2.0 - 10.0 % Final    Eosinophils % 10/30/2023 1.1  0.0 - 6.0 % Final    Basophils % 10/30/2023 1.0  0.0 - 2.0 % Final    Neutrophils Absolute 10/30/2023 3.41  1.20 - 7.70 x10*3/uL Final    Immature Granulocytes Absolute, Au* 10/30/2023 0.02  0.00 - 0.70 x10*3/uL Final    Lymphocytes Absolute 10/30/2023 2.03  1.20 - 4.80 x10*3/uL Final    Monocytes Absolute 10/30/2023 0.60  0.10 - 1.00 x10*3/uL  Final    Eosinophils Absolute 10/30/2023 0.07  0.00 - 0.70 x10*3/uL Final    Basophils Absolute 10/30/2023 0.06  0.00 - 0.10 x10*3/uL Final    Glucose 10/30/2023 106 (H)  74 - 99 mg/dL Final    Sodium 10/30/2023 142  136 - 145 mmol/L Final    Potassium 10/30/2023 4.3  3.5 - 5.3 mmol/L Final    Chloride 10/30/2023 107  98 - 107 mmol/L Final    Bicarbonate 10/30/2023 22  21 - 32 mmol/L Final    Anion Gap 10/30/2023 17  10 - 20 mmol/L Final    Urea Nitrogen 10/30/2023 14  6 - 23 mg/dL Final    Creatinine 10/30/2023 0.89  0.50 - 1.30 mg/dL Final    eGFR 10/30/2023 >90  >60 mL/min/1.73m*2 Final    Calcium 10/30/2023 9.5  8.6 - 10.6 mg/dL Final    Albumin 10/30/2023 4.5  3.4 - 5.0 g/dL Final    Alkaline Phosphatase 10/30/2023 92  33 - 136 U/L Final    Total Protein 10/30/2023 7.4  6.4 - 8.2 g/dL Final    AST 10/30/2023 18  9 - 39 U/L Final    Bilirubin, Total 10/30/2023 0.6  0.0 - 1.2 mg/dL Final    ALT 10/30/2023 22  10 - 52 U/L Final    Hemoglobin A1C 10/30/2023 6.4 (H)  see below % Final    Estimated Average Glucose 10/30/2023 137  Not Established mg/dL Final    Cholesterol 10/30/2023 151  0 - 199 mg/dL Final    HDL-Cholesterol 10/30/2023 45.2  mg/dL Final    Cholesterol/HDL Ratio 10/30/2023 3.3   Final    LDL Calculated 10/30/2023 73  <=99 mg/dL Final    VLDL 10/30/2023 33  0 - 40 mg/dL Final    Triglycerides 10/30/2023 165 (H)  0 - 149 mg/dL Final    Non HDL Cholesterol 10/30/2023 106  0 - 149 mg/dL Final       Radiology: Reviewed imaging in powerchart.  No results found.      Charting was completed using voice recognition technology and may include unintended errors.

## 2024-03-27 PROCEDURE — 99223 1ST HOSP IP/OBS HIGH 75: CPT | Performed by: STUDENT IN AN ORGANIZED HEALTH CARE EDUCATION/TRAINING PROGRAM

## 2024-03-31 ENCOUNTER — LAB REQUISITION (OUTPATIENT)
Dept: LAB | Facility: HOSPITAL | Age: 69
End: 2024-03-31
Payer: COMMERCIAL

## 2024-04-01 LAB
LABORATORY COMMENT REPORT: NORMAL
PATH REPORT.GROSS SPEC: NORMAL
PATH REPORT.TOTAL CANCER: NORMAL

## 2024-04-02 ENCOUNTER — TELEPHONE (OUTPATIENT)
Dept: PRIMARY CARE | Facility: CLINIC | Age: 69
End: 2024-04-02
Payer: COMMERCIAL

## 2024-04-02 ENCOUNTER — PATIENT OUTREACH (OUTPATIENT)
Dept: CARE COORDINATION | Facility: CLINIC | Age: 69
End: 2024-04-02
Payer: COMMERCIAL

## 2024-04-02 NOTE — TELEPHONE ENCOUNTER
----- Message from Nichole Last MA sent at 4/2/2024  7:18 AM EDT -----    ----- Message -----  From: Lin Barbosa MD  Sent: 4/1/2024  12:01 PM EDT  To: Alfredo Duncan, PCNA    Glucose 190 mild anemia advise low-carb diet GI evaluation for anemia follow-up in office bring all medicine with you

## 2024-04-02 NOTE — TELEPHONE ENCOUNTER
----- Message from Nichole Last MA sent at 4/2/2024  7:19 AM EDT -----    ----- Message -----  From: Lin Barbosa MD  Sent: 4/1/2024  12:10 PM EDT  To: KARISSA Alamo    1. 1.8 cm solid spiculated nodule in the left upper lobe consistent with bronchogenic carcinoma until proven otherwise.   Recommendation: Tissue sampling and/or PET-CT imaging.   2. Infiltrates in the right middle lobe, lingular segment and posterior left lower lobe. Given multifocal nature, question aspiration type pneumonia.   Advised to see pulmonary and oncology follow-up in office for visit Hospital discharge visit

## 2024-04-02 NOTE — PROGRESS NOTES
Discharge Facility:[facility]  Discharge Diagnosis:  SOB  Pneumonia  Hemoptysis     Admission Date:3/26/2024  Discharge Date: 3/31/2024    PCP Appointment Date:4/4/2024  Specialist Appointment Date: Unknown    Hospital Encounter and Summary: not available at this time      [Joint Pain] : joint pain [Joint Swelling] : joint swelling [Negative] : Heme/Lymph

## 2024-04-02 NOTE — TELEPHONE ENCOUNTER
These results were handed to Shira since PT will be here Thursday and she will be working with Dr. Barbosa on Thursday. I informed Shira regarding this

## 2024-04-04 ENCOUNTER — OFFICE VISIT (OUTPATIENT)
Dept: PRIMARY CARE | Facility: CLINIC | Age: 69
End: 2024-04-04
Payer: COMMERCIAL

## 2024-04-04 VITALS
HEIGHT: 66 IN | HEART RATE: 62 BPM | DIASTOLIC BLOOD PRESSURE: 66 MMHG | SYSTOLIC BLOOD PRESSURE: 107 MMHG | OXYGEN SATURATION: 95 % | WEIGHT: 145 LBS | BODY MASS INDEX: 23.3 KG/M2 | TEMPERATURE: 97 F

## 2024-04-04 DIAGNOSIS — Z09 HOSPITAL DISCHARGE FOLLOW-UP: Primary | ICD-10-CM

## 2024-04-04 DIAGNOSIS — C34.91 ADENOCARCINOMA OF RIGHT LUNG (MULTI): ICD-10-CM

## 2024-04-04 DIAGNOSIS — F41.9 ANXIETY AND DEPRESSION: ICD-10-CM

## 2024-04-04 DIAGNOSIS — M10.00 IDIOPATHIC GOUT, UNSPECIFIED CHRONICITY, UNSPECIFIED SITE: ICD-10-CM

## 2024-04-04 DIAGNOSIS — F32.A ANXIETY AND DEPRESSION: ICD-10-CM

## 2024-04-04 DIAGNOSIS — D49.6: ICD-10-CM

## 2024-04-04 PROBLEM — E11.59 HYPERTENSION ASSOCIATED WITH DIABETES (MULTI): Status: RESOLVED | Noted: 2023-03-09 | Resolved: 2024-04-04

## 2024-04-04 PROBLEM — J18.9 COMMUNITY ACQUIRED PNEUMONIA OF RIGHT MIDDLE LOBE OF LUNG: Status: RESOLVED | Noted: 2024-03-26 | Resolved: 2024-04-04

## 2024-04-04 PROBLEM — E78.5 HYPERLIPIDEMIA ASSOCIATED WITH TYPE 2 DIABETES MELLITUS (MULTI): Status: RESOLVED | Noted: 2023-03-09 | Resolved: 2024-04-04

## 2024-04-04 PROBLEM — E11.8 DIABETES MELLITUS WITH COMPLICATION IN ADULT PATIENT (MULTI): Status: RESOLVED | Noted: 2023-03-09 | Resolved: 2024-04-04

## 2024-04-04 PROBLEM — K64.2 GRADE III HEMORRHOIDS: Status: RESOLVED | Noted: 2024-03-26 | Resolved: 2024-04-04

## 2024-04-04 PROBLEM — E11.69 HYPERLIPIDEMIA ASSOCIATED WITH TYPE 2 DIABETES MELLITUS (MULTI): Status: RESOLVED | Noted: 2023-03-09 | Resolved: 2024-04-04

## 2024-04-04 PROBLEM — I15.2 HYPERTENSION ASSOCIATED WITH DIABETES (MULTI): Status: RESOLVED | Noted: 2023-03-09 | Resolved: 2024-04-04

## 2024-04-04 PROCEDURE — 1159F MED LIST DOCD IN RCRD: CPT | Performed by: INTERNAL MEDICINE

## 2024-04-04 PROCEDURE — 1160F RVW MEDS BY RX/DR IN RCRD: CPT | Performed by: INTERNAL MEDICINE

## 2024-04-04 PROCEDURE — 1157F ADVNC CARE PLAN IN RCRD: CPT | Performed by: INTERNAL MEDICINE

## 2024-04-04 PROCEDURE — 99495 TRANSJ CARE MGMT MOD F2F 14D: CPT | Performed by: INTERNAL MEDICINE

## 2024-04-04 RX ORDER — CEFDINIR 300 MG/1
CAPSULE ORAL
COMMUNITY
Start: 2024-04-01 | End: 2024-04-04 | Stop reason: WASHOUT

## 2024-04-04 RX ORDER — METHYLPREDNISOLONE 4 MG/1
TABLET ORAL
COMMUNITY
Start: 2024-04-01 | End: 2024-04-04 | Stop reason: WASHOUT

## 2024-04-04 NOTE — ASSESSMENT & PLAN NOTE
Non-small cell cancer referred to oncology pulmonary service for hemoptysis prognosis discussed short-term follow-up recommend monitor oxygen's hemoptysis and weight loss  Adenocarcinoma the left lung

## 2024-04-04 NOTE — ASSESSMENT & PLAN NOTE
Depression is chronic and quite common and notorious mental health disorder and it is quite common and widespread, there are several therapeutics available for depression now a days. It is considered as chemical imbalance disorder and with medications , it can be adjusted. There are side effects from SSRI and SNRIs but on long term, they are well tolerated. Please do not feel awkward or shy to contact us if feel tired, sleepy, lack of energy or aloof/ alone. Untreated depression can bring serious consequences including suicidal ideations, mental health counselling is available if need arises. Usually treatment of depression is long lasting therapy with periodic evaluations and follow ups. Pt with depression no longer have to feel frustrated, helpless or isolated.Detailed discussion was carried out.

## 2024-04-04 NOTE — PROGRESS NOTES
Subjective   Patient ID: Fortunato Romano is a 68 y.o. male who presents for Hospital Follow-up.    Assessment/Plan     Problem List Items Addressed This Visit       Brain tumor, recurrent (CMS/HCC)    Gout    Anxiety and depression     Depression is chronic and quite common and notorious mental health disorder and it is quite common and widespread, there are several therapeutics available for depression now a days. It is considered as chemical imbalance disorder and with medications , it can be adjusted. There are side effects from SSRI and SNRIs but on long term, they are well tolerated. Please do not feel awkward or shy to contact us if feel tired, sleepy, lack of energy or aloof/ alone. Untreated depression can bring serious consequences including suicidal ideations, mental health counselling is available if need arises. Usually treatment of depression is long lasting therapy with periodic evaluations and follow ups. Pt with depression no longer have to feel frustrated, helpless or isolated.Detailed discussion was carried out.             Hospital discharge follow-up - Primary     Face to face visit with patient discuss discharge medication and outpatient medication ceconciliations and answers all questions to patient's and caregiver review hospital record pending diagnostic test and treatment orders to improved coordinate care across the medical community and  referal with provider/service to support patient's health and health related problems to increase patient's satisfaction by reducing risk of readmission I improving And meeting patient's needs advise bring all prescription and nonprescription medication with you.    Patient hospitalized since last visit medication list reviewed and  reconciled with discharge medications         Adenocarcinoma of right lung (CMS/HCC)     Non-small cell cancer referred to oncology pulmonary service for hemoptysis prognosis discussed short-term follow-up recommend monitor  oxygen's hemoptysis and weight loss  Adenocarcinoma the left lung            HPI 68-year-old patient of osteoarthritis gouty arthritis hypertension hyperlipidemia anxiety depression gastritis recently came my office complaining of the cough congestions hemoptysis CAT scan was abnormal sent to the hospital treated with the IV fluid IV steroid IV antibiotic discharged on antibiotic finish medication start doing diet and exercise no more hemoptysis or hypoxia or fever at around 7 pound weight loss found out have a by biopsy had a non-small cell lung cancer adenocarcinoma of the lung going to be evaluated    Pulmonary service    Oncology service    Short-term long-term prognosis      No need to take any steroid or antibiotic at this point    Patient will continue current medication follow-up in a 4 weeks repeat CBC BMP chest x-ray    Discussed with the wife about PET scan and prognosis  Past Medical History:   Diagnosis Date    Benign prostatic hyperplasia without lower urinary tract symptoms 05/25/2021    Enlarged prostate without lower urinary tract symptoms (luts)    Encounter for screening for malignant neoplasm of respiratory organs 05/02/2022    Encounter for screening for lung cancer    Essential (primary) hypertension 05/13/2021    Benign essential hypertension    Generalized anxiety disorder 05/25/2021    VIVEK (generalized anxiety disorder)    Mixed hyperlipidemia 05/13/2021    Hyperlipidemia, mixed    Other abnormal glucose     Elevated glucose    Other conditions influencing health status     Postoperative seroma    Other conditions influencing health status 02/05/2018    History of cough    Other specified soft tissue disorders 12/14/2017    Soft tissue mass    Pain in left knee 05/30/2017    Left knee pain    Personal history of benign neoplasm of the brain 05/13/2021    History of benign neoplasm of brain    Personal history of neoplasm of uncertain behavior 05/07/2018    History of neoplasm of uncertain  behavior    Personal history of other diseases of the circulatory system 05/08/2019    History of hypertension    Personal history of other diseases of the digestive system 05/02/2022    History of fatty infiltration of liver    Personal history of other diseases of the musculoskeletal system and connective tissue 07/05/2022    History of polymyalgia rheumatica    Personal history of other diseases of the musculoskeletal system and connective tissue 11/04/2021    History of gout    Personal history of other diseases of the respiratory system 05/02/2022    History of sinusitis    Personal history of other diseases of the respiratory system 02/05/2018    History of upper respiratory infection    Personal history of other endocrine, nutritional and metabolic disease 11/04/2021    History of diabetes mellitus    Personal history of other endocrine, nutritional and metabolic disease 05/08/2019    History of hyperlipidemia    Personal history of other endocrine, nutritional and metabolic disease 05/25/2021    History of vitamin D deficiency    Personal history of other specified conditions 02/13/2018    History of ataxia    Personal history of other specified conditions 07/22/2016    History of urinary frequency    Personal history of other specified conditions 07/22/2016    History of fever    Personal history of other specified conditions 11/20/2017    History of bradycardia    Vitamin D deficiency, unspecified 09/08/2015    Vitamin D deficiency     Past Surgical History:   Procedure Laterality Date    MANDIBLE SURGERY  11/01/2017    Jaw Surgery    OTHER SURGICAL HISTORY  07/22/2016    Brain Surgery     Allergies   Allergen Reactions    Pollen Extracts Unknown     Current Outpatient Medications   Medication Sig Dispense Refill    allopurinol (Zyloprim) 300 mg tablet Take 1 tablet (300 mg) by mouth once daily. 90 tablet 3    amLODIPine (Norvasc) 5 mg tablet Take 1 tablet (5 mg) by mouth once daily. 90 tablet 3     gabapentin (Neurontin) 100 mg capsule TAKE 1 CAPSULE (100 MG) BY MOUTH TWO TIMES DAILY 60 capsule 0    olmesartan (BENIcar) 40 mg tablet TAKE ONE TABLET BY MOUTH EVERY DAY 90 tablet 3    PARoxetine (PaxiL) 20 mg tablet Take 1 tablet (20 mg) by mouth once daily in the morning. 90 tablet 3    rosuvastatin (Crestor) 10 mg tablet Take 1 tablet (10 mg) by mouth once daily. 90 tablet 3    pantoprazole (ProtoNix) 40 mg EC tablet Take 1 tablet (40 mg) by mouth 2 times a day. Do not crush, chew, or split. (Patient not taking: Reported on 4/4/2024) 30 tablet 0     No current facility-administered medications for this visit.     Family History   Problem Relation Name Age of Onset    Stomach cancer Mother      Stomach cancer Sister      Brain cancer Son       Social History     Socioeconomic History    Marital status:      Spouse name: Not on file    Number of children: Not on file    Years of education: Not on file    Highest education level: Not on file   Occupational History    Not on file   Tobacco Use    Smoking status: Former     Types: Cigarettes    Smokeless tobacco: Never   Substance and Sexual Activity    Alcohol use: Never    Drug use: Never    Sexual activity: Not on file   Other Topics Concern    Not on file   Social History Narrative    Not on file     Social Determinants of Health     Financial Resource Strain: Not on file   Food Insecurity: Not on file   Transportation Needs: Not on file   Physical Activity: Not on file   Stress: Not on file   Social Connections: Not on file   Intimate Partner Violence: Not on file   Housing Stability: Not on file     Immunization History   Administered Date(s) Administered    Influenza, Unspecified 09/02/2015    Influenza, seasonal, injectable 09/01/2022    Moderna COVID-19 vaccine, Fall 2023, 12 yeasrs and older (50mcg/0.5mL) 10/27/2023    Moderna COVID-19 vaccine, bivalent, blue cap/gray label *Check age/dose* 09/29/2022    Moderna SARS-CoV-2 Vaccination 03/10/2021,  "04/07/2021, 10/27/2021, 04/22/2022    PPD Test 11/22/2011    Pneumococcal Conjugate PCV 7 10/09/2018    Pneumococcal conjugate vaccine, 13-valent (PREVNAR 13) 10/09/2018    Pneumococcal polysaccharide vaccine, 23-valent, age 2 years and older (PNEUMOVAX 23) 02/06/2020    RSV, 60 Years And Older (AREXVY) 01/12/2024    Tdap vaccine, age 7 year and older (BOOSTRIX, ADACEL) 10/09/2018    Zoster vaccine, recombinant, adult (SHINGRIX) 05/08/2019, 02/06/2020    Zoster, Unspecified 10/19/2022, 12/21/2022       Review of Systems  Review of systems is otherwise negative unless stated above or in history of present illness.    Objective   Visit Vitals  /66 (BP Location: Left arm, Patient Position: Sitting)   Pulse 62   Temp 36.1 °C (97 °F)   Ht 1.676 m (5' 6\")   Wt 65.8 kg (145 lb)   SpO2 95%   BMI 23.40 kg/m²   Smoking Status Former   BSA 1.75 m²     Physical Exam  Constitutional: Chronically ill     General: not in acute distress.   HENT:      Head: Normocephalic and atraumatic.      Nose: Nose normal.   Eyes:      Extraocular Movements: Extraocular movements intact.      Conjunctiva/sclera: Conjunctivae normal.   Cardiovascular: Heart murmur     Rate and Rhythm: Normal rate ,  No M/R/G  Pulmonary: Decreased air entry crackles rales rhonchi     Effort: Pulmonary effort is normal.      Breath sounds: Normal, Bilat Equal AE  Skin: Dry skin no clubbing     General: Skin is warm.   Neurological:      Mental Status: He is alert and oriented to person, place, and time.   Psychiatric:   Anxiety depression without suicide   Mood and Affect: Mood normal.         Behavior: Behavior normal.   Musculoskeletal   FROM in all extremitirs,  Joint-no swelling or tenderness    Lab Requisition on 03/28/2024   Component Date Value Ref Range Status    Case Report 03/28/2024    Final                    Value:Medical Cytology Report                           Case: [id number]                                  Authorizing Provider:  Dolores" Baltazar Choe, Collected:           03/28/2024 1200                                     MD                                                                           Ordering Location:     [facility]       Received:            03/31/2024 2126                                     Center                                                                       Specimens:   A) - BRONCHIAL BRUSH LEFT LOWER LOBE, BRONCHIAL BRUSH LEFT UPPER LOBE                               B) - BRONCHIAL WASH OF LEFT UPPER LOBE                                                     Gross Description 03/28/2024    Final                    Value:This result contains rich text formatting which cannot be displayed here.    Specimen Processing Detail 03/28/2024    Final                    Value:This result contains rich text formatting which cannot be displayed here.   Orders Only on 03/26/2024   Component Date Value Ref Range Status    [facility] Instr WBC 03/26/2024 7.7   Final    [facility] MCV 03/26/2024 86.1  80.0 - 100.0 fL Final    [facility] MPV 03/26/2024 6.7 (L)  7.4 - 10.4 fL Final    [facility] RDW 03/26/2024 14.3  11.5 - 14.5 % Final    [facility] WBC 03/26/2024 7.7  4.5 - 11.0 x10 Final    [facility] MCH 03/26/2024 28.4  27.0 - 34.0 pg Final    [facility] Nucleated RBC 03/26/2024 0  /100WBC Final    [facility] HCT 03/26/2024 38.4 (L)  41.0 - 52.0 % Final    [facility] Platelet 03/26/2024 296  150 - 450 x10 Final    [facility] HGB 03/26/2024 12.7 (L)  13.5 - 17.5 g/dL Final    [facility] RBC 03/26/2024 4.46 (L)  4.70 - 6.10 x10 Final    [facility] MCHC 03/26/2024 33.0  32.0 - 37.0 g/dL Final    [facility] DIFF? 03/26/2024 No   Final    [facility] Baso Count 03/26/2024 0.06  0.00 - 0.20 x1000 Final    [facility] Mono % 03/26/2024 6.5  % Final    [facility] Mono Count 03/26/2024 0.50  0.10 - 1.00 x1000 Final    [facility] Neutrophil Count (ANC) 03/26/2024 4.81  1.40 - 8.80 x1000 Final    [facility] Neutrophil % 03/26/2024 62.6   % Final    [facility] Eos Count 03/26/2024 0.16  0.00 - 0.50 x1000 Final    [facility] Eosin % 03/26/2024 2.0  % Final    [facility] Lymph % 03/26/2024 28.1  % Final    [facility] Lymph Count 03/26/2024 2.15  1.20 - 4.80 x1000 Final    [facility] Basos % 03/26/2024 0.7  % Final    [facility] Chloride 03/26/2024 109 (H)  98 - 107 mmol/L Final    [facility] Glomerular Filtration R* 03/26/2024 >60  mL/min/1.73m? Final    [facility] BUN/Creat Ratio 03/26/2024 18.9   Final    [facility] Na 03/26/2024 140  135 - 145 mmol/L Final    [facility] Creatinine 03/26/2024 0.9  0.6 - 1.1 mg/dL Final    [facility] GFR AA 03/26/2024 >60   Final    [facility] CO2, venous 03/26/2024 23.0  20.0 - 31.0 mmol/L Final    [facility] Calculated Osmolality 03/26/2024 282  275 - 295 mOsm/kg Final    [facility] K 03/26/2024 4.0  3.5 - 5.1 mmol/L Final    [facility] BUN 03/26/2024 17  9 - 23 mg/dL Final    [facility] Calcium 03/26/2024 9.0  8.7 - 10.4 mg/dL Final    [facility] Glucose 03/26/2024 119 (H)  74 - 106 mg/dL Final       Radiology: Reviewed imaging in powerchart.  CT chest wo IV contrast    Result Date: 3/26/2024  CT OF THE CHEST WITHOUT IV CONTRAST CLINICAL INDICATIONS:  Right-sided chest pain. COMPARISON:  None. TECHNIQUE:  Helical axial CT images obtained through the chest without IV contrast.  Multiplanar reformatted images were generated from the axial scan data set.  Automatic exposure control was used. RADIATION DOSE:  Total exam DLP 164mGy.cm: CTDI vol (mGy): 4.1 FINDINGS:There are multiple scattered nonenlarged mediastinal lymph nodes. Thoracic aortic caliber is normal. There are no pleural or pericardial effusions. The adrenal glands are normal. There is cholelithiasis. Lung window images: Right lung: Patchy infiltrate in the medial right lower lobe, image 77, series #4. Left lung: There is a spiculated solid mass in the posteromedial upper lobe, image 25, series #4 measuring 1.8 cm consistent with bronchogenic carcinoma  until proven otherwise. This mass abuts the medial pleura as seen on image 29 of series #4. There is interstitial infiltration in the lingular segment, image 77, series #4. There is diffuse posterior left lower lobe mixed interstitial-alveolar infiltrate. Bone window images: There are no lytic osseous lesions. There is a 0.9 cm sclerotic lesion along the subcortical inferior left glenoid having the appearance of a bone island. ______________________ IMPRESSION: 1. 1.8 cm solid spiculated nodule in the left upper lobe consistent with bronchogenic carcinoma until proven otherwise. Recommendation: Tissue sampling and/or PET-CT imaging. 2. Infiltrates in the right middle lobe, lingular segment and posterior left lower lobe. Given multifocal nature, question aspiration type pneumonia. ____________________________ This report was created using voice recognition software and/or free text entry.  I have reviewed this report but may have inadvertently missed correcting erroneous or unusual sound-a-like words.  Electronically signed by:  Edward Cornejo MD  03/26/2024 03:12 PM EDT Workstation: [id number] Technologist:  SHAMA RAMÍREZ Dictated By:   EDWARD CORNEJO MD Signed By:     EDWARD CORNEJO MD Signed Out:    03/26/24 15:12:48        Charting was completed using voice recognition technology and may include unintended errors.

## 2024-04-04 NOTE — ASSESSMENT & PLAN NOTE
Face to face visit with patient discuss discharge medication and outpatient medication ceconciliations and answers all questions to patient's and caregiver review hospital record pending diagnostic test and treatment orders to improved coordinate care across the medical community and  referal with provider/service to support patient's health and health related problems to increase patient's satisfaction by reducing risk of readmission I improving And meeting patient's needs advise bring all prescription and nonprescription medication with you.    Patient hospitalized since last visit medication list reviewed and  reconciled with discharge medications

## 2024-04-05 ENCOUNTER — PATIENT OUTREACH (OUTPATIENT)
Dept: CARE COORDINATION | Facility: CLINIC | Age: 69
End: 2024-04-05
Payer: COMMERCIAL

## 2024-04-05 NOTE — PROGRESS NOTES
Unable to reach patient for call back after patient's follow up appointment with PCP.   WENDYM with call back number for patient to call if needed   If no voicemail available call attempts x 2 were made to contact the patient to assist with any questions or concerns patient may have.

## 2024-05-28 ENCOUNTER — OFFICE VISIT (OUTPATIENT)
Dept: PRIMARY CARE | Facility: CLINIC | Age: 69
End: 2024-05-28
Payer: COMMERCIAL

## 2024-05-28 VITALS
DIASTOLIC BLOOD PRESSURE: 72 MMHG | SYSTOLIC BLOOD PRESSURE: 120 MMHG | OXYGEN SATURATION: 98 % | WEIGHT: 150.41 LBS | HEART RATE: 74 BPM | HEIGHT: 66 IN | BODY MASS INDEX: 24.17 KG/M2

## 2024-05-28 DIAGNOSIS — I15.2 HYPERTENSION ASSOCIATED WITH DIABETES (MULTI): ICD-10-CM

## 2024-05-28 DIAGNOSIS — E78.5 HYPERLIPIDEMIA ASSOCIATED WITH TYPE 2 DIABETES MELLITUS (MULTI): ICD-10-CM

## 2024-05-28 DIAGNOSIS — E11.8 DIABETES MELLITUS WITH COMPLICATION IN ADULT PATIENT (MULTI): ICD-10-CM

## 2024-05-28 DIAGNOSIS — E11.69 HYPERLIPIDEMIA ASSOCIATED WITH TYPE 2 DIABETES MELLITUS (MULTI): ICD-10-CM

## 2024-05-28 DIAGNOSIS — C34.90 PRIMARY MALIGNANT NEOPLASM OF LUNG METASTATIC TO OTHER SITE, UNSPECIFIED LATERALITY (MULTI): ICD-10-CM

## 2024-05-28 DIAGNOSIS — E11.59 HYPERTENSION ASSOCIATED WITH DIABETES (MULTI): ICD-10-CM

## 2024-05-28 DIAGNOSIS — C34.91 ADENOCARCINOMA OF RIGHT LUNG (MULTI): ICD-10-CM

## 2024-05-28 DIAGNOSIS — M1A.2710 DRUG-INDUCED CHRONIC GOUT OF RIGHT FOOT WITHOUT TOPHUS: Primary | ICD-10-CM

## 2024-05-28 PROBLEM — D49.6: Status: RESOLVED | Noted: 2023-03-09 | Resolved: 2024-05-28

## 2024-05-28 PROCEDURE — 96372 THER/PROPH/DIAG INJ SC/IM: CPT | Performed by: INTERNAL MEDICINE

## 2024-05-28 PROCEDURE — 1160F RVW MEDS BY RX/DR IN RCRD: CPT | Performed by: INTERNAL MEDICINE

## 2024-05-28 PROCEDURE — 3078F DIAST BP <80 MM HG: CPT | Performed by: INTERNAL MEDICINE

## 2024-05-28 PROCEDURE — 1159F MED LIST DOCD IN RCRD: CPT | Performed by: INTERNAL MEDICINE

## 2024-05-28 PROCEDURE — 3074F SYST BP LT 130 MM HG: CPT | Performed by: INTERNAL MEDICINE

## 2024-05-28 PROCEDURE — G2211 COMPLEX E/M VISIT ADD ON: HCPCS | Performed by: INTERNAL MEDICINE

## 2024-05-28 PROCEDURE — 1157F ADVNC CARE PLAN IN RCRD: CPT | Performed by: INTERNAL MEDICINE

## 2024-05-28 PROCEDURE — 1036F TOBACCO NON-USER: CPT | Performed by: INTERNAL MEDICINE

## 2024-05-28 PROCEDURE — 4010F ACE/ARB THERAPY RXD/TAKEN: CPT | Performed by: INTERNAL MEDICINE

## 2024-05-28 PROCEDURE — 99214 OFFICE O/P EST MOD 30 MIN: CPT | Performed by: INTERNAL MEDICINE

## 2024-05-28 RX ORDER — COLCHICINE 0.6 MG/1
0.6 TABLET ORAL 2 TIMES DAILY
Qty: 10 TABLET | Refills: 0 | Status: SHIPPED | OUTPATIENT
Start: 2024-05-28

## 2024-05-28 RX ORDER — TRIAMCINOLONE ACETONIDE 40 MG/ML
40 INJECTION, SUSPENSION INTRA-ARTICULAR; INTRAMUSCULAR ONCE
Status: COMPLETED | OUTPATIENT
Start: 2024-05-28 | End: 2024-05-28

## 2024-05-28 RX ORDER — METFORMIN HYDROCHLORIDE 500 MG/1
TABLET ORAL
COMMUNITY
Start: 2024-04-18

## 2024-05-28 RX ADMIN — TRIAMCINOLONE ACETONIDE 40 MG: 40 INJECTION, SUSPENSION INTRA-ARTICULAR; INTRAMUSCULAR at 13:39

## 2024-05-28 NOTE — ASSESSMENT & PLAN NOTE
Lung cancer with metastasis advised follow-up with the oncology radiation oncologist continue chemotherapy

## 2024-05-28 NOTE — ASSESSMENT & PLAN NOTE
Given Kenalog 40 intramuscular colchicine 0.6 mg twice a day for 5 days check uric acid x-ray of the foot left foot

## 2024-05-28 NOTE — PROGRESS NOTES
Subjective   Patient ID: Fortunato Romano is a 68 y.o. male who presents for Follow-up (3 month ).    Assessment/Plan     Problem List Items Addressed This Visit       Diabetes mellitus with complication in adult patient (Multi)    Relevant Orders    CBC and Auto Differential    Comprehensive Metabolic Panel    Hemoglobin A1C    Lipid Panel    Uric Acid    Gout - Primary     Given Kenalog 40 intramuscular colchicine 0.6 mg twice a day for 5 days check uric acid x-ray of the foot left foot         Relevant Medications    triamcinolone acetonide (Kenalog-40) injection 40 mg (Completed)    Hyperlipidemia associated with type 2 diabetes mellitus (Multi)    Relevant Orders    CBC and Auto Differential    Comprehensive Metabolic Panel    Hemoglobin A1C    Lipid Panel    Uric Acid    Hypertension associated with diabetes (Multi)    Relevant Orders    CBC and Auto Differential    Comprehensive Metabolic Panel    Hemoglobin A1C    Lipid Panel    Uric Acid    Adenocarcinoma of right lung (Multi)    Relevant Orders    CBC and Auto Differential    Comprehensive Metabolic Panel    Hemoglobin A1C    Lipid Panel    Uric Acid    Lung cancer, primary, with metastasis from lung to other site (Multi)     Lung cancer with metastasis advised follow-up with the oncology radiation oncologist continue chemotherapy          Procedures  Procedure Date Related Diagnosis Body Site Status   bronchoscopy 3/28/24     Completed   Suboccipital craniotomy and resection of cerebellar lesion1 11/3/14     Completed   Aspiration Ultrasound Guided.       Completed   Excision, tumor, soft tissue of upper arm or elbow area, subfascial (eg, intramuscular); 5 cm or greater       Completed   plate in jaw; right-sided       Completed   screw Right shoulder       Completed   Spinal puncture, lumbar, diagnostic       Completed   1Performed by Dr. Meza at [facility] 68-year-old patient have a diabetes hypertension hyperlipidemia ex-smoker lung cancer with  metastasis seen by the pulmonary oncology radiation oncologist urologist and neurologist.    Diagnosis of acute onset of the left foot pain onset acutely duration 72 hours progressed acutely aggravated by the activity relieved with the rest associated or weight loss    Negative for any trauma skin rash    Negative for weakness    Negative for nausea vomiting diarrhea constipation lactic acidosis or suicidal ideation    Review oncology note documented in the chart    Advise discontinue amlodipine    Discontinue Crestor    Patient will continue allopurinol 300 mg a day for gout gabapentin 100 mg a day for neuropathy Glucophage 1000 mg a day for type 2 diabetes Benicar 40 mg a day for proteinuria hypertension    Paxil 20 mg a day for anxiety depression      Past Medical History:   Diagnosis Date    Benign prostatic hyperplasia without lower urinary tract symptoms 05/25/2021    Enlarged prostate without lower urinary tract symptoms (luts)    Encounter for screening for malignant neoplasm of respiratory organs 05/02/2022    Encounter for screening for lung cancer    Essential (primary) hypertension 05/13/2021    Benign essential hypertension    Generalized anxiety disorder 05/25/2021    VIVEK (generalized anxiety disorder)    Mixed hyperlipidemia 05/13/2021    Hyperlipidemia, mixed    Other abnormal glucose     Elevated glucose    Other conditions influencing health status     Postoperative seroma    Other conditions influencing health status 02/05/2018    History of cough    Other specified soft tissue disorders 12/14/2017    Soft tissue mass    Pain in left knee 05/30/2017    Left knee pain    Personal history of benign neoplasm of the brain 05/13/2021    History of benign neoplasm of brain    Personal history of neoplasm of uncertain behavior 05/07/2018    History of neoplasm of uncertain behavior    Personal history of other diseases of the circulatory system 05/08/2019    History of hypertension    Personal history of  other diseases of the digestive system 05/02/2022    History of fatty infiltration of liver    Personal history of other diseases of the musculoskeletal system and connective tissue 07/05/2022    History of polymyalgia rheumatica    Personal history of other diseases of the musculoskeletal system and connective tissue 11/04/2021    History of gout    Personal history of other diseases of the respiratory system 05/02/2022    History of sinusitis    Personal history of other diseases of the respiratory system 02/05/2018    History of upper respiratory infection    Personal history of other endocrine, nutritional and metabolic disease 11/04/2021    History of diabetes mellitus    Personal history of other endocrine, nutritional and metabolic disease 05/08/2019    History of hyperlipidemia    Personal history of other endocrine, nutritional and metabolic disease 05/25/2021    History of vitamin D deficiency    Personal history of other specified conditions 02/13/2018    History of ataxia    Personal history of other specified conditions 07/22/2016    History of urinary frequency    Personal history of other specified conditions 07/22/2016    History of fever    Personal history of other specified conditions 11/20/2017    History of bradycardia    Vitamin D deficiency, unspecified 09/08/2015    Vitamin D deficiency     Past Surgical History:   Procedure Laterality Date    MANDIBLE SURGERY  11/01/2017    Jaw Surgery    OTHER SURGICAL HISTORY  07/22/2016    Brain Surgery     Allergies   Allergen Reactions    Pollen Extracts Unknown     Current Outpatient Medications   Medication Sig Dispense Refill    allopurinol (Zyloprim) 300 mg tablet Take 1 tablet (300 mg) by mouth once daily. 90 tablet 3    amLODIPine (Norvasc) 5 mg tablet Take 1 tablet (5 mg) by mouth once daily. 90 tablet 3    gabapentin (Neurontin) 100 mg capsule TAKE 1 CAPSULE (100 MG) BY MOUTH TWO TIMES DAILY 60 capsule 0    metFORMIN (Glucophage) 500 mg tablet  Date: 4/18/24 2:58:00 PM EDT      olmesartan (BENIcar) 40 mg tablet TAKE ONE TABLET BY MOUTH EVERY DAY 90 tablet 3    osimertinib (Tagrisso) 80 mg tablet 1 tabs, ORAL, DAILY, 30 tabs, Date: 4/24/24 11:11:00 AM EDT, Tablet      PARoxetine (PaxiL) 20 mg tablet Take 1 tablet (20 mg) by mouth once daily in the morning. 90 tablet 3    rosuvastatin (Crestor) 10 mg tablet Take 1 tablet (10 mg) by mouth once daily. 90 tablet 3     No current facility-administered medications for this visit.     Family History   Problem Relation Name Age of Onset    Stomach cancer Mother      Stomach cancer Sister      Brain cancer Son       Social History     Socioeconomic History    Marital status:      Spouse name: None    Number of children: None    Years of education: None    Highest education level: None   Occupational History    None   Tobacco Use    Smoking status: Former     Types: Cigarettes    Smokeless tobacco: Never   Substance and Sexual Activity    Alcohol use: Never    Drug use: Never    Sexual activity: None   Other Topics Concern    None   Social History Narrative    None     Social Determinants of Health     Financial Resource Strain: Not on file   Food Insecurity: Not on file   Transportation Needs: Not on file   Physical Activity: Not on file   Stress: Not on file   Social Connections: Not on file   Intimate Partner Violence: Not on file   Housing Stability: Not on file     Immunization History   Administered Date(s) Administered    Flu vaccine (IIV4), preservative free *Check age/dose* 01/01/2018, 12/01/2019, 09/01/2022    Influenza, Unspecified 09/02/2015    Influenza, seasonal, injectable 09/02/2015, 09/01/2022    Moderna COVID-19 vaccine, Fall 2023, 12 yeasrs and older (50mcg/0.5mL) 10/27/2023    Moderna COVID-19 vaccine, bivalent, blue cap/gray label *Check age/dose* 09/29/2022    Moderna SARS-CoV-2 Vaccination 03/10/2021, 04/07/2021, 10/27/2021, 04/22/2022    PPD Test 11/22/2011    Pneumococcal Conjugate PCV  "7 10/09/2018    Pneumococcal conjugate vaccine, 13-valent (PREVNAR 13) 10/09/2018    Pneumococcal polysaccharide vaccine, 23-valent, age 2 years and older (PNEUMOVAX 23) 02/06/2020    RSV, 60 Years And Older (AREXVY) 01/12/2024    Tdap vaccine, age 7 year and older (BOOSTRIX, ADACEL) 10/09/2018    Zoster vaccine, recombinant, adult (SHINGRIX) 05/08/2019, 02/06/2020    Zoster, Unspecified 10/19/2022, 12/21/2022       Review of Systems  Review of systems is otherwise negative unless stated above or in history of present illness.    Objective   Visit Vitals  /72 (BP Location: Left arm, Patient Position: Sitting, BP Cuff Size: Adult)   Pulse 74   Ht 1.676 m (5' 6\")   Wt 68.2 kg (150 lb 6.6 oz)   SpO2 98%   BMI 24.28 kg/m²   Smoking Status Former   BSA 1.78 m²     Physical Exam  Cardiovascular:      Pulses:           Dorsalis pedis pulses are 2+ on the right side and 2+ on the left side.        Posterior tibial pulses are 2+ on the right side and 2+ on the left side.   Musculoskeletal:      Right foot: No deformity.      Left foot: No deformity.   Feet:      Right foot:      Protective Sensation: 5 sites tested.        Skin integrity: Skin integrity normal.      Toenail Condition: Right toenails are normal.      Left foot:      Protective Sensation: 5 sites tested.        Skin integrity: Skin integrity normal.      Toenail Condition: Left toenails are normal.       Constitutional: BMI 24     General: not in acute distress.   HENT:      Head: Normocephalic and atraumatic.      Nose: Nose normal.   Eyes: No jaundice     Extraocular Movements: Extraocular movements intact.      Conjunctiva/sclera: Conjunctivae normal.   Cardiovascular: Heart murmur     Rate and Rhythm: Normal rate ,  No M/R/G  Pulmonary: Expiratory rhonchi with crackles     Effort: Pulmonary effort is normal.      Breath sounds: Normal, Bilat Equal AE  Skin: Dry skin     General: Skin is warm.   Neurological: Chemical neuropathy he is alert and " oriented to person, place, and time.   Psychiatric:         Mood and Affect: Mood normal.         Behavior: Behavior normal.   Musculoskeletal tenderness of the left foot    Lab Requisition on 03/28/2024   Component Date Value Ref Range Status    Case Report 03/28/2024    Final                    Value:Medical Cytology Report                           Case: [id number]                                  Authorizing Provider:  Dolores Choe, Collected:           03/28/2024 1200                                     MD                                                                           Ordering Location:     [facility]       Received:            03/31/2024 1292                                     Center                                                                       Specimens:   A) - BRONCHIAL BRUSH LEFT LOWER LOBE, BRONCHIAL BRUSH LEFT UPPER LOBE                               B) - BRONCHIAL WASH OF LEFT UPPER LOBE                                                     Gross Description 03/28/2024    Final                    Value:This result contains rich text formatting which cannot be displayed here.    Specimen Processing Detail 03/28/2024    Final                    Value:This result contains rich text formatting which cannot be displayed here.   Orders Only on 03/26/2024   Component Date Value Ref Range Status    [facility] Instr WBC 03/26/2024 7.7   Final    [facility] MCV 03/26/2024 86.1  80.0 - 100.0 fL Final    [facility] MPV 03/26/2024 6.7 (L)  7.4 - 10.4 fL Final    [facility] RDW 03/26/2024 14.3  11.5 - 14.5 % Final    [facility] WBC 03/26/2024 7.7  4.5 - 11.0 x10 Final    [facility] MCH 03/26/2024 28.4  27.0 - 34.0 pg Final    [facility] Nucleated RBC 03/26/2024 0  /100WBC Final    [facility] HCT 03/26/2024 38.4 (L)  41.0 - 52.0 % Final    [facility] Platelet 03/26/2024 296  150 - 450 x10 Final    [facility] HGB 03/26/2024 12.7 (L)  13.5 - 17.5 g/dL Final    [facility] RBC  03/26/2024 4.46 (L)  4.70 - 6.10 x10 Final    [facility] MCHC 03/26/2024 33.0  32.0 - 37.0 g/dL Final    [facility] DIFF? 03/26/2024 No   Final    [facility] Baso Count 03/26/2024 0.06  0.00 - 0.20 x1000 Final    [facility] Mono % 03/26/2024 6.5  % Final    [facility] Mono Count 03/26/2024 0.50  0.10 - 1.00 x1000 Final    [facility] Neutrophil Count (ANC) 03/26/2024 4.81  1.40 - 8.80 x1000 Final    [facility] Neutrophil % 03/26/2024 62.6  % Final    [facility] Eos Count 03/26/2024 0.16  0.00 - 0.50 x1000 Final    [facility] Eosin % 03/26/2024 2.0  % Final    [facility] Lymph % 03/26/2024 28.1  % Final    [facility] Lymph Count 03/26/2024 2.15  1.20 - 4.80 x1000 Final    [facility] Basos % 03/26/2024 0.7  % Final    [facility] Chloride 03/26/2024 109 (H)  98 - 107 mmol/L Final    [facility] Glomerular Filtration R* 03/26/2024 >60  mL/min/1.73m? Final    [facility] BUN/Creat Ratio 03/26/2024 18.9   Final    [facility] Na 03/26/2024 140  135 - 145 mmol/L Final    [facility] Creatinine 03/26/2024 0.9  0.6 - 1.1 mg/dL Final    [facility] GFR AA 03/26/2024 >60   Final    [facility] CO2, venous 03/26/2024 23.0  20.0 - 31.0 mmol/L Final    [facility] Calculated Osmolality 03/26/2024 282  275 - 295 mOsm/kg Final    [facility] K 03/26/2024 4.0  3.5 - 5.1 mmol/L Final    [facility] BUN 03/26/2024 17  9 - 23 mg/dL Final    [facility] Calcium 03/26/2024 9.0  8.7 - 10.4 mg/dL Final    [facility] Glucose 03/26/2024 119 (H)  74 - 106 mg/dL Final       Radiology: Reviewed imaging in powerchart.  No results found.      Charting was completed using voice recognition technology and may include unintended errors.

## 2024-05-30 ENCOUNTER — TELEPHONE (OUTPATIENT)
Dept: PRIMARY CARE | Facility: CLINIC | Age: 69
End: 2024-05-30

## 2024-05-30 LAB
NON-UH HIE A/G RATIO: 1.3
NON-UH HIE ALB: 4.1 G/DL (ref 3.4–5)
NON-UH HIE ALK PHOS: 89 UNIT/L (ref 45–117)
NON-UH HIE BASO COUNT: 0.04 X1000 (ref 0–0.2)
NON-UH HIE BASOS %: 0.5 %
NON-UH HIE BILIRUBIN, TOTAL: 0.6 MG/DL (ref 0.3–1.2)
NON-UH HIE BUN/CREAT RATIO: 18.2
NON-UH HIE BUN: 20 MG/DL (ref 9–23)
NON-UH HIE CALCIUM: 9.4 MG/DL (ref 8.7–10.4)
NON-UH HIE CALCULATED LDL CHOLESTEROL: 52 MG/DL (ref 60–130)
NON-UH HIE CALCULATED OSMOLALITY: 285 MOSM/KG (ref 275–295)
NON-UH HIE CHLORIDE: 108 MMOL/L (ref 98–107)
NON-UH HIE CHOLESTEROL: 140 MG/DL (ref 100–200)
NON-UH HIE CO2, VENOUS: 24 MMOL/L (ref 20–31)
NON-UH HIE CREATININE: 1.1 MG/DL (ref 0.6–1.1)
NON-UH HIE DIFF?: NO
NON-UH HIE EOS COUNT: 0.03 X1000 (ref 0–0.5)
NON-UH HIE EOSIN %: 0.3 %
NON-UH HIE GFR AA: >60
NON-UH HIE GLOBULIN: 3.1 G/DL
NON-UH HIE GLOMERULAR FILTRATION RATE: >60 ML/MIN/1.73M?
NON-UH HIE GLUCOSE: 112 MG/DL (ref 74–106)
NON-UH HIE GOT: 27 UNIT/L (ref 15–37)
NON-UH HIE GPT: 44 UNIT/L (ref 10–49)
NON-UH HIE HCT: 44.2 % (ref 41–52)
NON-UH HIE HDL CHOLESTEROL: 57 MG/DL (ref 40–60)
NON-UH HIE HGB A1C: 5.8 %
NON-UH HIE HGB: 14.3 G/DL (ref 13.5–17.5)
NON-UH HIE INSTR WBC: 8.8
NON-UH HIE K: 4.1 MMOL/L (ref 3.5–5.1)
NON-UH HIE LYMPH %: 14.2 %
NON-UH HIE LYMPH COUNT: 1.25 X1000 (ref 1.2–4.8)
NON-UH HIE MCH: 27.6 PG (ref 27–34)
NON-UH HIE MCHC: 32.2 G/DL (ref 32–37)
NON-UH HIE MCV: 85.7 FL (ref 80–100)
NON-UH HIE MONO %: 5.2 %
NON-UH HIE MONO COUNT: 0.46 X1000 (ref 0.1–1)
NON-UH HIE MPV: 7.8 FL (ref 7.4–10.4)
NON-UH HIE NA: 141 MMOL/L (ref 135–145)
NON-UH HIE NEUTROPHIL %: 79.8 %
NON-UH HIE NEUTROPHIL COUNT (ANC): 7.02 X1000 (ref 1.4–8.8)
NON-UH HIE NUCLEATED RBC: 0 /100WBC
NON-UH HIE PLATELET: 254 X10 (ref 150–450)
NON-UH HIE RBC: 5.16 X10 (ref 4.7–6.1)
NON-UH HIE RDW: 15.1 % (ref 11.5–14.5)
NON-UH HIE TOTAL CHOL/HDL CHOL RATIO: 2.5
NON-UH HIE TOTAL PROTEIN: 7.2 G/DL (ref 5.7–8.2)
NON-UH HIE TRIGLYCERIDES: 153 MG/DL (ref 30–150)
NON-UH HIE URIC ACID: 4.4 MG/DL (ref 3.7–9.2)
NON-UH HIE WBC: 8.8 X10 (ref 4.5–11)

## 2024-05-30 NOTE — TELEPHONE ENCOUNTER
----- Message from Lin Barbosa MD sent at 5/30/2024  3:49 PM EDT -----  Glucose 112 hemoglobin A1c 5.8 advised low-carb diet

## 2024-05-31 ENCOUNTER — TELEPHONE (OUTPATIENT)
Dept: PRIMARY CARE | Facility: CLINIC | Age: 69
End: 2024-05-31
Payer: COMMERCIAL

## 2024-05-31 NOTE — TELEPHONE ENCOUNTER
----- Message from Lin Barbosa MD sent at 5/31/2024 11:50 AM EDT -----  LDL 52 triglyceride 153 discontinue cholesterol medicine

## 2024-06-04 DIAGNOSIS — M19.90 ARTHRITIS: ICD-10-CM

## 2024-06-04 RX ORDER — GABAPENTIN 100 MG/1
100 CAPSULE ORAL 2 TIMES DAILY
Qty: 60 CAPSULE | Refills: 0 | Status: SHIPPED | OUTPATIENT
Start: 2024-06-04

## 2024-06-27 LAB — HEMOGLOBIN A1C/HEMOGLOBIN TOTAL IN BLOOD EXTERNAL: 5.8 %

## 2024-07-30 DIAGNOSIS — M19.90 ARTHRITIS: ICD-10-CM

## 2024-07-30 DIAGNOSIS — M1A.2710 DRUG-INDUCED CHRONIC GOUT OF RIGHT FOOT WITHOUT TOPHUS: ICD-10-CM

## 2024-08-01 RX ORDER — ALLOPURINOL 300 MG/1
300 TABLET ORAL DAILY
Qty: 90 TABLET | Refills: 3 | Status: SHIPPED | OUTPATIENT
Start: 2024-08-01

## 2024-08-01 RX ORDER — GABAPENTIN 100 MG/1
100 CAPSULE ORAL 2 TIMES DAILY
Qty: 60 CAPSULE | Refills: 2 | Status: SHIPPED | OUTPATIENT
Start: 2024-08-01

## 2024-08-29 ENCOUNTER — TELEPHONE (OUTPATIENT)
Dept: PRIMARY CARE | Facility: CLINIC | Age: 69
End: 2024-08-29

## 2024-08-29 ENCOUNTER — APPOINTMENT (OUTPATIENT)
Dept: PRIMARY CARE | Facility: CLINIC | Age: 69
End: 2024-08-29
Payer: COMMERCIAL

## 2024-08-29 VITALS
DIASTOLIC BLOOD PRESSURE: 81 MMHG | HEIGHT: 66 IN | OXYGEN SATURATION: 96 % | WEIGHT: 138 LBS | HEART RATE: 70 BPM | TEMPERATURE: 97.1 F | BODY MASS INDEX: 22.18 KG/M2 | SYSTOLIC BLOOD PRESSURE: 129 MMHG

## 2024-08-29 DIAGNOSIS — E11.59 HYPERTENSION ASSOCIATED WITH DIABETES (MULTI): ICD-10-CM

## 2024-08-29 DIAGNOSIS — C34.91 ADENOCARCINOMA OF RIGHT LUNG (MULTI): ICD-10-CM

## 2024-08-29 DIAGNOSIS — F32.A ANXIETY AND DEPRESSION: Primary | ICD-10-CM

## 2024-08-29 DIAGNOSIS — I15.2 HYPERTENSION ASSOCIATED WITH DIABETES (MULTI): ICD-10-CM

## 2024-08-29 DIAGNOSIS — N40.0 BENIGN PROSTATIC HYPERPLASIA WITHOUT LOWER URINARY TRACT SYMPTOMS: ICD-10-CM

## 2024-08-29 DIAGNOSIS — M10.00 IDIOPATHIC GOUT, UNSPECIFIED CHRONICITY, UNSPECIFIED SITE: ICD-10-CM

## 2024-08-29 DIAGNOSIS — F41.9 ANXIETY AND DEPRESSION: Primary | ICD-10-CM

## 2024-08-29 DIAGNOSIS — E11.8 DIABETES MELLITUS WITH COMPLICATION IN ADULT PATIENT (MULTI): ICD-10-CM

## 2024-08-29 DIAGNOSIS — E78.5 HYPERLIPIDEMIA ASSOCIATED WITH TYPE 2 DIABETES MELLITUS (MULTI): ICD-10-CM

## 2024-08-29 DIAGNOSIS — E11.69 HYPERLIPIDEMIA ASSOCIATED WITH TYPE 2 DIABETES MELLITUS (MULTI): ICD-10-CM

## 2024-08-29 PROBLEM — Z09 HOSPITAL DISCHARGE FOLLOW-UP: Status: RESOLVED | Noted: 2024-04-04 | Resolved: 2024-08-29

## 2024-08-29 LAB
NON-UH HIE A/G RATIO: 1.5
NON-UH HIE ALB: 4.1 G/DL (ref 3.4–5)
NON-UH HIE ALK PHOS: 67 UNIT/L (ref 45–117)
NON-UH HIE BASO COUNT: 0.04 X1000 (ref 0–0.2)
NON-UH HIE BASOS %: 0.8 %
NON-UH HIE BILIRUBIN, TOTAL: 1.2 MG/DL (ref 0.3–1.2)
NON-UH HIE BUN/CREAT RATIO: 12.7
NON-UH HIE BUN: 14 MG/DL (ref 9–23)
NON-UH HIE CALCIUM: 8.9 MG/DL (ref 8.7–10.4)
NON-UH HIE CALCULATED LDL CHOLESTEROL: 123 MG/DL (ref 60–130)
NON-UH HIE CALCULATED OSMOLALITY: 283 MOSM/KG (ref 275–295)
NON-UH HIE CHLORIDE: 109 MMOL/L (ref 98–107)
NON-UH HIE CHOLESTEROL: 201 MG/DL (ref 100–200)
NON-UH HIE CO2, VENOUS: 27 MMOL/L (ref 20–31)
NON-UH HIE CREATININE, URINE MG/DL: 302.7 MG/DL
NON-UH HIE CREATININE: 1.1 MG/DL (ref 0.6–1.1)
NON-UH HIE DIFF?: NO
NON-UH HIE EOS COUNT: 0.16 X1000 (ref 0–0.5)
NON-UH HIE EOSIN %: 3.8 %
NON-UH HIE GFR AA: >60
NON-UH HIE GLOBULIN: 2.8 G/DL
NON-UH HIE GLOMERULAR FILTRATION RATE: >60 ML/MIN/1.73M?
NON-UH HIE GLUCOSE: 94 MG/DL (ref 74–106)
NON-UH HIE GOT: 19 UNIT/L (ref 15–37)
NON-UH HIE GPT: 14 UNIT/L (ref 10–49)
NON-UH HIE HCT: 47.3 % (ref 41–52)
NON-UH HIE HDL CHOLESTEROL: 54 MG/DL (ref 40–60)
NON-UH HIE HGB: 15.5 G/DL (ref 13.5–17.5)
NON-UH HIE INSTR WBC: 4.3
NON-UH HIE K: 4.1 MMOL/L (ref 3.5–5.1)
NON-UH HIE LYMPH %: 28.2 %
NON-UH HIE LYMPH COUNT: 1.22 X1000 (ref 1.2–4.8)
NON-UH HIE MAGNESIUM: 2.1 MG/DL (ref 1.6–2.6)
NON-UH HIE MCH: 28.7 PG (ref 27–34)
NON-UH HIE MCHC: 32.9 G/DL (ref 32–37)
NON-UH HIE MCV: 87.3 FL (ref 80–100)
NON-UH HIE MICROALBUMIN, URINE MG/L: 49 MG/L
NON-UH HIE MICROALBUMIN/CREATININE RATIO: 16 MG MALB/GM CREAT (ref 0–30)
NON-UH HIE MONO %: 10 %
NON-UH HIE MONO COUNT: 0.43 X1000 (ref 0.1–1)
NON-UH HIE MPV: 7.9 FL (ref 7.4–10.4)
NON-UH HIE NA: 142 MMOL/L (ref 135–145)
NON-UH HIE NEUTROPHIL %: 57.1 %
NON-UH HIE NEUTROPHIL COUNT (ANC): 2.46 X1000 (ref 1.4–8.8)
NON-UH HIE NUCLEATED RBC: 0 /100WBC
NON-UH HIE PLATELET: 187 X10 (ref 150–450)
NON-UH HIE PROSTATIC SPECIFIC AG SCREEN: 1.3 NG/ML (ref 0–4)
NON-UH HIE RBC: 5.42 X10 (ref 4.7–6.1)
NON-UH HIE RDW: 16.4 % (ref 11.5–14.5)
NON-UH HIE RED BLOOD CELL MORPHOLOGY: ABNORMAL
NON-UH HIE TOTAL CHOL/HDL CHOL RATIO: 3.7
NON-UH HIE TOTAL PROTEIN: 6.9 G/DL (ref 5.7–8.2)
NON-UH HIE TRIGLYCERIDES: 119 MG/DL (ref 30–150)
NON-UH HIE TSH: 1.16 UIU/ML (ref 0.55–4.78)
NON-UH HIE URIC ACID: 4.1 MG/DL (ref 3.7–9.2)
NON-UH HIE WBC: 4.3 X10 (ref 4.5–11)

## 2024-08-29 PROCEDURE — 3074F SYST BP LT 130 MM HG: CPT | Performed by: INTERNAL MEDICINE

## 2024-08-29 PROCEDURE — 1159F MED LIST DOCD IN RCRD: CPT | Performed by: INTERNAL MEDICINE

## 2024-08-29 PROCEDURE — 3044F HG A1C LEVEL LT 7.0%: CPT | Performed by: INTERNAL MEDICINE

## 2024-08-29 PROCEDURE — 1036F TOBACCO NON-USER: CPT | Performed by: INTERNAL MEDICINE

## 2024-08-29 PROCEDURE — 99214 OFFICE O/P EST MOD 30 MIN: CPT | Performed by: INTERNAL MEDICINE

## 2024-08-29 PROCEDURE — 1160F RVW MEDS BY RX/DR IN RCRD: CPT | Performed by: INTERNAL MEDICINE

## 2024-08-29 PROCEDURE — 1123F ACP DISCUSS/DSCN MKR DOCD: CPT | Performed by: INTERNAL MEDICINE

## 2024-08-29 PROCEDURE — 1157F ADVNC CARE PLAN IN RCRD: CPT | Performed by: INTERNAL MEDICINE

## 2024-08-29 PROCEDURE — 4010F ACE/ARB THERAPY RXD/TAKEN: CPT | Performed by: INTERNAL MEDICINE

## 2024-08-29 PROCEDURE — 3008F BODY MASS INDEX DOCD: CPT | Performed by: INTERNAL MEDICINE

## 2024-08-29 PROCEDURE — 3079F DIAST BP 80-89 MM HG: CPT | Performed by: INTERNAL MEDICINE

## 2024-08-29 PROCEDURE — G2211 COMPLEX E/M VISIT ADD ON: HCPCS | Performed by: INTERNAL MEDICINE

## 2024-08-29 RX ORDER — METFORMIN HYDROCHLORIDE 500 MG/1
500 TABLET ORAL
Qty: 90 TABLET | Refills: 3 | Status: SHIPPED | OUTPATIENT
Start: 2024-08-29

## 2024-08-29 NOTE — TELEPHONE ENCOUNTER
----- Message from Lin Barbosa sent at 8/29/2024  5:17 PM EDT -----  Cholesterol 201    Discontinue metformin discontinue colchicine just take Paxil plus gabapentin plus allopurinol plus Benicar

## 2024-08-29 NOTE — PROGRESS NOTES
Subjective   Patient ID: Fortunato Romano is a 68 y.o. male who presents for Follow-up (3 month ).    Assessment/Plan     Problem List Items Addressed This Visit       Diabetes mellitus with complication in adult patient (Multi) - Primary     Refer to podiatry ophthalmologist and dietitian         Relevant Medications    metFORMIN (Glucophage) 500 mg tablet    Other Relevant Orders    Albumin-Creatinine Ratio, Urine Random    CBC and Auto Differential    Comprehensive Metabolic Panel    Lipid Panel    Magnesium    Uric Acid    TSH with reflex to Free T4 if abnormal    Prostate Specific Antigen, Screen    Gout     Monitor uric acid twice a year keep uric acid less than 6         Relevant Orders    Albumin-Creatinine Ratio, Urine Random    CBC and Auto Differential    Comprehensive Metabolic Panel    Lipid Panel    Magnesium    Uric Acid    TSH with reflex to Free T4 if abnormal    Prostate Specific Antigen, Screen    Hyperlipidemia associated with type 2 diabetes mellitus (Multi)     Monitor CMP CPK lipid twice a year         Relevant Orders    Albumin-Creatinine Ratio, Urine Random    CBC and Auto Differential    Comprehensive Metabolic Panel    Lipid Panel    Magnesium    Uric Acid    TSH with reflex to Free T4 if abnormal    Prostate Specific Antigen, Screen    Hypertension associated with diabetes (Multi)     Monitor BMP twice a year         Anxiety and depression     Depression is chronic and quite common and notorious mental health disorder and it is quite common and widespread, there are several therapeutics available for depression now a days. It is considered as chemical imbalance disorder and with medications , it can be adjusted. There are side effects from SSRI and SNRIs but on long term, they are well tolerated. Please do not feel awkward or shy to contact us if feel tired, sleepy, lack of energy or aloof/ alone. Untreated depression can bring serious consequences including suicidal ideations, mental  health counselling is available if need arises. Usually treatment of depression is long lasting therapy with periodic evaluations and follow ups. Pt with depression no longer have to feel frustrated, helpless or isolated.Detailed discussion was carried out.           Adenocarcinoma of right lung (Multi)     Advised follow-up with the oncology twice a year nutrition twice a year         Relevant Orders    Albumin-Creatinine Ratio, Urine Random    CBC and Auto Differential    Comprehensive Metabolic Panel    Lipid Panel    Magnesium    Uric Acid    TSH with reflex to Free T4 if abnormal    Prostate Specific Antigen, Screen     Other Visit Diagnoses       Benign prostatic hyperplasia without lower urinary tract symptoms        Relevant Orders    Prostate Specific Antigen, Screen          bronchoscopy 3/28/24     Completed   Suboccipital craniotomy and resection of cerebellar lesion1 11/3/14     Completed   Aspiration Ultrasound Guided.       Completed   Excision, tumor, soft tissue of upper arm or elbow area, subfascial (eg, intramuscular); 5 cm or greater       Completed   plate in jaw; right-sided       Completed   screw Right shoulder       Completed   Spinal puncture, lumbar, diagnostic       Completed   1Performed by Dr. Meza at    Social History    Social History  Social History Type Response   Smoking Status Former smoker  entered on: 10/20/14     Patient was evaluated today, problem list was reviewed, problems and concerns addressed, Rx list reviewed and updated, lab and tests were noted and reviewed. Life style changes were discussed, always it works better if we eat plant based diet and plenty of fibres and roughage. Consume adequate amount of water and avoid alcohol, light to moderate physical activities and stress reduction are always beneficial for ongoing physical well being. Do not forget to have 6 to 7 hours of sleep regularly and avoid late night jasson screen exposure.    HPI 68-year-old patient  who had a history of diabetes hypertension hyperlipidemia BPH anxiety depression brain tumor from meningioma had adenocarcinoma the right lung underwent further surgery chemoradiation therapy doing reasonably well seen by the cardiothoracic surgery seen by the oncology radiation oncology review note    Negative for hypoxia or anorexia or weight loss    Negative for fall    Negative for nausea vomiting diarrhea constipation    Negative for suicide    Patient doing reasonably well around the blood pressure blood sugar ended depression medication review laboratory review medication sit down with the patient we discussed that because of his losing weight getting chemoradiation therapy he may be we need to set cut down or stop some of the cholesterol blood pressure or diabetic medication please send for blood test she will come back follow-up in 4 to 6 weeks to discuss  Past Medical History:   Diagnosis Date    Benign prostatic hyperplasia without lower urinary tract symptoms 05/25/2021    Enlarged prostate without lower urinary tract symptoms (luts)    Encounter for screening for malignant neoplasm of respiratory organs 05/02/2022    Encounter for screening for lung cancer    Essential (primary) hypertension 05/13/2021    Benign essential hypertension    Generalized anxiety disorder 05/25/2021    VIVEK (generalized anxiety disorder)    Mixed hyperlipidemia 05/13/2021    Hyperlipidemia, mixed    Other abnormal glucose     Elevated glucose    Other conditions influencing health status     Postoperative seroma    Other conditions influencing health status 02/05/2018    History of cough    Other specified soft tissue disorders 12/14/2017    Soft tissue mass    Pain in left knee 05/30/2017    Left knee pain    Personal history of benign neoplasm of the brain 05/13/2021    History of benign neoplasm of brain    Personal history of neoplasm of uncertain behavior 05/07/2018    History of neoplasm of uncertain behavior    Personal  history of other diseases of the circulatory system 05/08/2019    History of hypertension    Personal history of other diseases of the digestive system 05/02/2022    History of fatty infiltration of liver    Personal history of other diseases of the musculoskeletal system and connective tissue 07/05/2022    History of polymyalgia rheumatica    Personal history of other diseases of the musculoskeletal system and connective tissue 11/04/2021    History of gout    Personal history of other diseases of the respiratory system 05/02/2022    History of sinusitis    Personal history of other diseases of the respiratory system 02/05/2018    History of upper respiratory infection    Personal history of other endocrine, nutritional and metabolic disease 11/04/2021    History of diabetes mellitus    Personal history of other endocrine, nutritional and metabolic disease 05/08/2019    History of hyperlipidemia    Personal history of other endocrine, nutritional and metabolic disease 05/25/2021    History of vitamin D deficiency    Personal history of other specified conditions 02/13/2018    History of ataxia    Personal history of other specified conditions 07/22/2016    History of urinary frequency    Personal history of other specified conditions 07/22/2016    History of fever    Personal history of other specified conditions 11/20/2017    History of bradycardia    Vitamin D deficiency, unspecified 09/08/2015    Vitamin D deficiency     Past Surgical History:   Procedure Laterality Date    MANDIBLE SURGERY  11/01/2017    Jaw Surgery    OTHER SURGICAL HISTORY  07/22/2016    Brain Surgery     Allergies   Allergen Reactions    Pollen Extracts Unknown     Current Outpatient Medications   Medication Sig Dispense Refill    allopurinol (Zyloprim) 300 mg tablet TAKE ONE TABLET BY MOUTH EVERY DAY 90 tablet 3    colchicine 0.6 mg tablet Take 1 tablet (0.6 mg) by mouth 2 times a day. 10 tablet 0    gabapentin (Neurontin) 100 mg capsule  TAKE ONE CAPSULE BY MOUTH TWICE A DAY 60 capsule 2    olmesartan (BENIcar) 40 mg tablet TAKE ONE TABLET BY MOUTH EVERY DAY 90 tablet 3    PARoxetine (PaxiL) 20 mg tablet Take 1 tablet (20 mg) by mouth once daily in the morning. 90 tablet 3    metFORMIN (Glucophage) 500 mg tablet Take 1 tablet (500 mg) by mouth once daily with breakfast. 90 tablet 3    osimertinib (Tagrisso) 80 mg tablet 1 tabs, ORAL, DAILY, 30 tabs, Date: 4/24/24 11:11:00 AM EDT, Tablet       No current facility-administered medications for this visit.     Family History   Problem Relation Name Age of Onset    Stomach cancer Mother      Stomach cancer Sister      Brain cancer Son       Social History     Socioeconomic History    Marital status:    Tobacco Use    Smoking status: Former     Types: Cigarettes    Smokeless tobacco: Never   Substance and Sexual Activity    Alcohol use: Never    Drug use: Never     Immunization History   Administered Date(s) Administered    Flu vaccine (IIV4), preservative free *Check age/dose* 01/01/2018, 12/01/2019, 09/01/2022    Flu vaccine, trivalent, preservative free, age 6 months and greater (Fluarix/Fluzone/Flulaval) 09/02/2015    Influenza, Unspecified 09/02/2015    Influenza, seasonal, injectable 09/02/2015, 09/01/2022    Moderna COVID-19 vaccine, Fall 2023, 12 yeasrs and older (50mcg/0.5mL) 10/27/2023    Moderna COVID-19 vaccine, bivalent, blue cap/gray label *Check age/dose* 09/29/2022    Moderna SARS-CoV-2 Vaccination 03/10/2021, 04/07/2021, 10/27/2021, 04/22/2022    PPD Test 11/22/2011    Pneumococcal Conjugate PCV 7 10/09/2018    Pneumococcal conjugate vaccine, 13-valent (PREVNAR 13) 10/09/2018    Pneumococcal polysaccharide vaccine, 23-valent, age 2 years and older (PNEUMOVAX 23) 02/06/2020    RSV, 60 Years And Older (AREXVY) 01/12/2024    Tdap vaccine, age 7 year and older (BOOSTRIX, ADACEL) 10/09/2018    Zoster vaccine, recombinant, adult (SHINGRIX) 05/08/2019, 02/06/2020    Zoster, Unspecified  "10/19/2022, 12/21/2022       Review of Systems  Review of systems is otherwise negative unless stated above or in history of present illness.    Objective   Visit Vitals  /81   Pulse 70   Temp 36.2 °C (97.1 °F)   Ht 1.676 m (5' 6\")   Wt 62.6 kg (138 lb)   SpO2 96%   BMI 22.27 kg/m²   Smoking Status Former   BSA 1.71 m²     Physical Exam  Constitutional: BMI 22     General: not in acute distress.   HENT:      Head: Normocephalic and atraumatic.      Nose: Nose normal.   Eyes: No jaundice     Extraocular Movements: Extraocular movements intact.      Conjunctiva/sclera: Conjunctivae normal.   Cardiovascular: Heart murmur     Rate and Rhythm: Normal rate ,  No M/R/G  Pulmonary: Rhonchi     Effort: Pulmonary effort is normal.      Breath sounds: Normal, Bilat Equal AE  Skin: Mild clubbing of the nail     General: Skin is warm.   Neurological: Chemical neuropathy osteopenia     Mental Status: He is alert and oriented to person, place, and time.   Psychiatric:         Mood and Affect: Mood normal.         Behavior: Behavior normal.   Musculoskeletal   FROM in all extremitirs,  Joint-no swelling or tenderness    Orders Only on 06/27/2024   Component Date Value Ref Range Status    Hemoglobin A1C External 05/30/2024 5.8  % Final   Orders Only on 05/30/2024   Component Date Value Ref Range Status    [facility] MCH 05/30/2024 27.6  27.0 - 34.0 pg Final    [facility] RDW 05/30/2024 15.1 (H)  11.5 - 14.5 % Final    [facility] HCT 05/30/2024 44.2  41.0 - 52.0 % Final    [facility] Instr WBC 05/30/2024 8.8   Final    [facility] MPV 05/30/2024 7.8  7.4 - 10.4 fL Final    [facility] RBC 05/30/2024 5.16  4.70 - 6.10 x10 Final    [facility] MCHC 05/30/2024 32.2  32.0 - 37.0 g/dL Final    [facility] Platelet 05/30/2024 254  150 - 450 x10 Final    [facility] Nucleated RBC 05/30/2024 0  /100WBC Final    [facility] MCV 05/30/2024 85.7  80.0 - 100.0 fL Final    [facility] WBC 05/30/2024 8.8  4.5 - 11.0 x10 Final    [facility] HGB " 05/30/2024 14.3  13.5 - 17.5 g/dL Final    [facility] DIFF? 05/30/2024 No   Final    [facility] Lymph Count 05/30/2024 1.25  1.20 - 4.80 x1000 Final    [facility] Basos % 05/30/2024 0.5  % Final    [facility] Baso Count 05/30/2024 0.04  0.00 - 0.20 x1000 Final    [facility] Mono Count 05/30/2024 0.46  0.10 - 1.00 x1000 Final    [facility] Mono % 05/30/2024 5.2  % Final    [facility] Neutrophil % 05/30/2024 79.8  % Final    [facility] Neutrophil Count (ANC) 05/30/2024 7.02  1.40 - 8.80 x1000 Final    [facility] Lymph % 05/30/2024 14.2  % Final    [facility] Eos Count 05/30/2024 0.03  0.00 - 0.50 x1000 Final    [facility] Eosin % 05/30/2024 0.3  % Final    [facility] HGB A1C 05/30/2024 5.8  % Final    [facility] Uric Acid 05/30/2024 4.4  3.7 - 9.2 mg/dL Final    [facility] A/G Ratio 05/30/2024 1.3   Final    [facility] Calculated Osmolality 05/30/2024 285  275 - 295 mOsm/kg Final    [facility] K 05/30/2024 4.1  3.5 - 5.1 mmol/L Final    [facility] Globulin 05/30/2024 3.1  g/dL Final    [facility] BUN 05/30/2024 20  9 - 23 mg/dL Final    [facility] Calcium 05/30/2024 9.4  8.7 - 10.4 mg/dL Final    [facility] GFR AA 05/30/2024 >60   Final    [facility] GOT 05/30/2024 27  15 - 37 unit/L Final    [facility] ALB 05/30/2024 4.1  3.4 - 5.0 g/dL Final    [facility] Glucose 05/30/2024 112 (H)  74 - 106 mg/dL Final    [facility] Glomerular Filtration R* 05/30/2024 >60  mL/min/1.73m? Final    [facility] Bilirubin, Total 05/30/2024 0.60  0.30 - 1.20 mg/dL Final    [facility] Chloride 05/30/2024 108 (H)  98 - 107 mmol/L Final    [facility] Na 05/30/2024 141  135 - 145 mmol/L Final    [facility] BUN/Creat Ratio 05/30/2024 18.2   Final    [facility] GPT 05/30/2024 44  10 - 49 unit/L Final    [facility] Creatinine 05/30/2024 1.1  0.6 - 1.1 mg/dL Final    [facility] Alk Phos 05/30/2024 89  45 - 117 unit/L Final    [facility] Total Protein 05/30/2024 7.2  5.7 - 8.2 g/dL Final    [facility] CO2, venous 05/30/2024 24.0  20.0 - 31.0  mmol/L Final    [facility] Calculated LDL Choleste* 05/30/2024 52 (L)  60 - 130 mg/dL Final    [facility] HDL Cholesterol 05/30/2024 57  40 - 60 mg/dL Final    [facility] Cholesterol 05/30/2024 140  100 - 200 mg/dL Final    [facility] Total Chol/HDL Chol Rat* 05/30/2024 2.5   Final    [facility] Triglycerides 05/30/2024 153 (H)  30 - 150 mg/dL Final       Radiology: Reviewed imaging in powerchart.  CT abdomen w IV contrast    Result Date: 8/22/2024  CLINICAL INDICATIONS:  Restaging of lung cancer. COMPARISON:  CT chest 3/26/2024. TECHNIQUE:  Helical axial CT images were acquired through the abdomen and pelvis with administration of nonionic intravenous contrast (dose above). Coronal reconstructions were performed.  Automatic exposure control was used. FINDINGS: There is mild bibasilar atelectasis. There is no evidence of pneumonia or pleural or pericardial effusions. Please note: Assessment of the solid abdominal viscera is limited as IV contrast was not administered. There is cholelithiasis. The liver and spleen and stomach and adrenal glands and pancreas are normal. Kidneys enhance normally with no calculus disease or hydronephrosis. Caliber of the abdominal aorta is normal. Large and small bowel caliber is normal with no pericolonic or perienteric fatty infiltrative changes. There is minimal sigmoid diverticulosis but no evidence of diverticulitis. There are no enlarged abdominal or pelvic lymph nodes or abdominal or pelvic soft tissue nodules or inflammatory process or ascites or free intraperitoneal air. Urinary bladder is decompressed. Prostate gland is not enlarged with multiple central calcified concretions. Seminal vesicles are normal. There are no lytic or blastic osseous lesions. IMPRESSION: 1. No evidence of abdominal or pelvic or osseous metastatic disease. 2. No evidence of an acute intra-abdominal or pelvic process. ____________________ This report was created using voice recognition software and/or  free text entry.  I have reviewed this report but may have inadvertently missed correcting erroneous or unusual sound-a-like words. Electronically signed by:  Edward Cornejo MD  08/22/2024 12:00 PM EDT  Workstation: [id number]    CT chest w IV contrast    Result Date: 8/22/2024  CT OF THE CHEST WITH IV CONTRAST CLINICAL INDICATIONS:  Follow-up treatment response of lung carcinoma. COMPARISON:  CT chest 3/26/2024. TECHNIQUE:  Helical axial CT images were obtained through the chest after the intravenous bolus administration of Isovue 370. Multiplanar reformatted images were generated from the axial scan data set.  Automatic exposure control was used. FINDINGS: Thoracic aorta is of normal caliber. There are no enlarged mediastinal or hilar lymph nodes. There are no coronary artery calcifications. There are no pleural or pericardial effusions. Lung window images: Right lung: No acute process. No pulmonary masses or nodules. Left lung: Spiculated 1.8 cm nodule in the left upper lobe now measures 0.6 cm as seen on image 27, series #3. There is a small medial superior pneumothorax seen on image 26 of series #3. There are no new left lung nodules or evidence of pneumonia. There is mild posterior basilar atelectasis. Bones: There are no suspicious interval lytic or blastic osseous lesions. There is a stable 1.3 cm sclerotic lesion in the lateral left third rib which is stable and compared with CT chest from 10/21/2014. IMPRESSION: 1. Marked interval reduction in size of 1.8 cm spiculated left upper lobe nodule now measuring 0.6 cm. 2. No interval intrathoracic or osseous metastatic disease. ____________________ This report was created using voice recognition software and/or free text entry.  I have reviewed this report but may have inadvertently missed correcting erroneous or unusual sound-a-like words. Electronically signed by:  Edward Cornejo MD  08/22/2024 12:06 PM EDT  Workstation: [id number]        Charting was  completed using voice recognition technology and may include unintended errors.

## 2024-09-13 ENCOUNTER — TELEPHONE (OUTPATIENT)
Dept: PRIMARY CARE | Facility: CLINIC | Age: 69
End: 2024-09-13
Payer: COMMERCIAL

## 2024-09-13 DIAGNOSIS — M1A.2710 DRUG-INDUCED CHRONIC GOUT OF RIGHT FOOT WITHOUT TOPHUS: ICD-10-CM

## 2024-09-13 DIAGNOSIS — M19.90 ARTHRITIS: ICD-10-CM

## 2024-09-13 RX ORDER — COLCHICINE 0.6 MG/1
0.6 TABLET ORAL 2 TIMES DAILY
Qty: 10 TABLET | Refills: 0 | Status: SHIPPED | OUTPATIENT
Start: 2024-09-13

## 2024-09-13 NOTE — TELEPHONE ENCOUNTER
Requesting refill for his gout. Asked pt if it was the colchicine he stated he didn't know. Allopurinol recently refilled for 1 year supply. [facility] in [place].   Also requesting handicap placard - his .

## 2024-12-02 ENCOUNTER — APPOINTMENT (OUTPATIENT)
Dept: PRIMARY CARE | Facility: CLINIC | Age: 69
End: 2024-12-02
Payer: COMMERCIAL

## 2024-12-02 VITALS
WEIGHT: 137.2 LBS | SYSTOLIC BLOOD PRESSURE: 113 MMHG | TEMPERATURE: 97.2 F | DIASTOLIC BLOOD PRESSURE: 72 MMHG | BODY MASS INDEX: 22.05 KG/M2 | HEART RATE: 76 BPM | HEIGHT: 66 IN | OXYGEN SATURATION: 96 %

## 2024-12-02 DIAGNOSIS — E11.8 DIABETES MELLITUS WITH COMPLICATION IN ADULT PATIENT (MULTI): Primary | ICD-10-CM

## 2024-12-02 DIAGNOSIS — I15.2 HYPERTENSION ASSOCIATED WITH DIABETES (MULTI): ICD-10-CM

## 2024-12-02 DIAGNOSIS — E11.69 HYPERLIPIDEMIA ASSOCIATED WITH TYPE 2 DIABETES MELLITUS (MULTI): ICD-10-CM

## 2024-12-02 DIAGNOSIS — E11.59 HYPERTENSION ASSOCIATED WITH DIABETES (MULTI): ICD-10-CM

## 2024-12-02 DIAGNOSIS — F41.9 ANXIETY AND DEPRESSION: ICD-10-CM

## 2024-12-02 DIAGNOSIS — F32.A ANXIETY AND DEPRESSION: ICD-10-CM

## 2024-12-02 DIAGNOSIS — C34.90 PRIMARY MALIGNANT NEOPLASM OF LUNG METASTATIC TO OTHER SITE, UNSPECIFIED LATERALITY (MULTI): ICD-10-CM

## 2024-12-02 DIAGNOSIS — M10.00 IDIOPATHIC GOUT, UNSPECIFIED CHRONICITY, UNSPECIFIED SITE: ICD-10-CM

## 2024-12-02 DIAGNOSIS — E78.5 HYPERLIPIDEMIA ASSOCIATED WITH TYPE 2 DIABETES MELLITUS (MULTI): ICD-10-CM

## 2024-12-02 PROBLEM — C34.91 ADENOCARCINOMA OF RIGHT LUNG (MULTI): Status: RESOLVED | Noted: 2024-04-04 | Resolved: 2024-12-02

## 2024-12-02 PROCEDURE — 1160F RVW MEDS BY RX/DR IN RCRD: CPT | Performed by: INTERNAL MEDICINE

## 2024-12-02 PROCEDURE — 3078F DIAST BP <80 MM HG: CPT | Performed by: INTERNAL MEDICINE

## 2024-12-02 PROCEDURE — G2211 COMPLEX E/M VISIT ADD ON: HCPCS | Performed by: INTERNAL MEDICINE

## 2024-12-02 PROCEDURE — 3074F SYST BP LT 130 MM HG: CPT | Performed by: INTERNAL MEDICINE

## 2024-12-02 PROCEDURE — 99214 OFFICE O/P EST MOD 30 MIN: CPT | Performed by: INTERNAL MEDICINE

## 2024-12-02 PROCEDURE — 1159F MED LIST DOCD IN RCRD: CPT | Performed by: INTERNAL MEDICINE

## 2024-12-02 PROCEDURE — 1123F ACP DISCUSS/DSCN MKR DOCD: CPT | Performed by: INTERNAL MEDICINE

## 2024-12-02 PROCEDURE — 1036F TOBACCO NON-USER: CPT | Performed by: INTERNAL MEDICINE

## 2024-12-02 PROCEDURE — 3008F BODY MASS INDEX DOCD: CPT | Performed by: INTERNAL MEDICINE

## 2024-12-02 PROCEDURE — 1157F ADVNC CARE PLAN IN RCRD: CPT | Performed by: INTERNAL MEDICINE

## 2024-12-02 PROCEDURE — 3044F HG A1C LEVEL LT 7.0%: CPT | Performed by: INTERNAL MEDICINE

## 2024-12-02 PROCEDURE — 4010F ACE/ARB THERAPY RXD/TAKEN: CPT | Performed by: INTERNAL MEDICINE

## 2024-12-02 ASSESSMENT — PATIENT HEALTH QUESTIONNAIRE - PHQ9
SUM OF ALL RESPONSES TO PHQ9 QUESTIONS 1 AND 2: 0
2. FEELING DOWN, DEPRESSED OR HOPELESS: NOT AT ALL
1. LITTLE INTEREST OR PLEASURE IN DOING THINGS: NOT AT ALL

## 2024-12-02 NOTE — PROGRESS NOTES
Subjective   Patient ID: Fortunato Romano is a 69 y.o. male who presents for Follow-up (3 month ).    Assessment/Plan     Problem List Items Addressed This Visit       RESOLVED: Diabetes mellitus with complication in adult patient (Multi) - Primary    Relevant Orders    Hemoglobin A1c    Albumin-Creatinine Ratio, Urine Random    Comprehensive Metabolic Panel    Hemoglobin A1C    Uric Acid    Gout     Continue allopurinol monitor uric acid keep uric acid less than 6         Relevant Orders    Hemoglobin A1c    Albumin-Creatinine Ratio, Urine Random    Comprehensive Metabolic Panel    Hemoglobin A1C    Uric Acid    Hyperlipidemia associated with type 2 diabetes mellitus (Multi)     Check hemoglobin A1c refer to dietitian         Hypertension associated with diabetes (Multi)     Continue Benicar monitor BMP twice a year         Anxiety and depression     PHQ 5 not suicidal         Lung cancer, primary, with metastasis from lung to other site (Multi)     Oncology follow-up twice a year         Relevant Orders    Hemoglobin A1c    Albumin-Creatinine Ratio, Urine Random    Comprehensive Metabolic Panel    Hemoglobin A1C    Uric Acid     Patient was evaluated today, problem list was reviewed, problems and concerns addressed, Rx list reviewed and updated, lab and tests were noted and reviewed. Life style changes were discussed, always it works better if we eat plant based diet and plenty of fibres and roughage. Consume adequate amount of water and avoid alcohol, light to moderate physical activities and stress reduction are always beneficial for ongoing physical well being. Do not forget to have 6 to 7 hours of sleep regularly and avoid late night jasson screen exposure.    HPI 69-year-old patient with gout lung cancer survival diet-controlled diabetes hypertension hyperlipidemia complaint of the tingling and numbness of her extremity lower extremity onset gradually duration few months progress slowly aggravated by underlying  sugar and cancer    Negative for weakness or fall    Negative for weight loss night sweat bone pain    No gouty arthritis attack    Patient will continue allopurinol colchicine gabapentin Benicar Paxil    Patient getting chemotherapy with core Tagrisso from the oncology      Past Medical History:   Diagnosis Date    Benign prostatic hyperplasia without lower urinary tract symptoms 05/25/2021    Enlarged prostate without lower urinary tract symptoms (luts)    Encounter for screening for malignant neoplasm of respiratory organs 05/02/2022    Encounter for screening for lung cancer    Essential (primary) hypertension 05/13/2021    Benign essential hypertension    Generalized anxiety disorder 05/25/2021    VIVEK (generalized anxiety disorder)    Mixed hyperlipidemia 05/13/2021    Hyperlipidemia, mixed    Other abnormal glucose     Elevated glucose    Other conditions influencing health status     Postoperative seroma    Other conditions influencing health status 02/05/2018    History of cough    Other specified soft tissue disorders 12/14/2017    Soft tissue mass    Pain in left knee 05/30/2017    Left knee pain    Personal history of benign neoplasm of the brain 05/13/2021    History of benign neoplasm of brain    Personal history of neoplasm of uncertain behavior 05/07/2018    History of neoplasm of uncertain behavior    Personal history of other diseases of the circulatory system 05/08/2019    History of hypertension    Personal history of other diseases of the digestive system 05/02/2022    History of fatty infiltration of liver    Personal history of other diseases of the musculoskeletal system and connective tissue 07/05/2022    History of polymyalgia rheumatica    Personal history of other diseases of the musculoskeletal system and connective tissue 11/04/2021    History of gout    Personal history of other diseases of the respiratory system 05/02/2022    History of sinusitis    Personal history of other diseases  of the respiratory system 02/05/2018    History of upper respiratory infection    Personal history of other endocrine, nutritional and metabolic disease 11/04/2021    History of diabetes mellitus    Personal history of other endocrine, nutritional and metabolic disease 05/08/2019    History of hyperlipidemia    Personal history of other endocrine, nutritional and metabolic disease 05/25/2021    History of vitamin D deficiency    Personal history of other specified conditions 02/13/2018    History of ataxia    Personal history of other specified conditions 07/22/2016    History of urinary frequency    Personal history of other specified conditions 07/22/2016    History of fever    Personal history of other specified conditions 11/20/2017    History of bradycardia    Vitamin D deficiency, unspecified 09/08/2015    Vitamin D deficiency     Past Surgical History:   Procedure Laterality Date    MANDIBLE SURGERY  11/01/2017    Jaw Surgery    OTHER SURGICAL HISTORY  07/22/2016    Brain Surgery     Allergies   Allergen Reactions    Pollen Extracts Unknown     Current Outpatient Medications   Medication Sig Dispense Refill    allopurinol (Zyloprim) 300 mg tablet TAKE ONE TABLET BY MOUTH EVERY DAY 90 tablet 3    gabapentin (Neurontin) 100 mg capsule TAKE ONE CAPSULE BY MOUTH TWICE A DAY 60 capsule 2    olmesartan (BENIcar) 40 mg tablet TAKE ONE TABLET BY MOUTH EVERY DAY 90 tablet 3    osimertinib (Tagrisso) 80 mg tablet 1 tabs, ORAL, DAILY, 30 tabs, Date: 4/24/24 11:11:00 AM EDT, Tablet      PARoxetine (PaxiL) 20 mg tablet Take 1 tablet (20 mg) by mouth once daily in the morning. 90 tablet 3     No current facility-administered medications for this visit.     Family History   Problem Relation Name Age of Onset    Stomach cancer Mother      Stomach cancer Sister      Brain cancer Son       Social History     Socioeconomic History    Marital status:    Tobacco Use    Smoking status: Former     Types: Cigarettes     "Smokeless tobacco: Never   Substance and Sexual Activity    Alcohol use: Never    Drug use: Never     Immunization History   Administered Date(s) Administered    Flu vaccine (IIV4), preservative free *Check age/dose* 01/01/2018, 12/01/2019, 09/01/2022    Flu vaccine, trivalent, preservative free, age 6 months and greater (Fluarix/Fluzone/Flulaval) 09/02/2015    Hep A / Hep B 10/11/2024, 11/12/2024    Influenza, Unspecified 09/02/2015    Influenza, seasonal, injectable 09/02/2015, 09/01/2022    Moderna COVID-19 vaccine, 12 years and older (50mcg/0.5mL)(Spikevax) 10/27/2023, 09/06/2024    Moderna COVID-19 vaccine, bivalent, blue cap/gray label *Check age/dose* 09/29/2022    Moderna SARS-CoV-2 Vaccination 03/10/2021, 04/07/2021, 10/27/2021, 04/22/2022    PPD Test 11/22/2011    Pneumococcal Conjugate PCV 7 10/09/2018    Pneumococcal conjugate vaccine, 13-valent (PREVNAR 13) 10/09/2018    Pneumococcal polysaccharide vaccine, 23-valent, age 2 years and older (PNEUMOVAX 23) 02/06/2020    RSV, 60 Years And Older (AREXVY) 01/12/2024    Tdap vaccine, age 7 year and older (BOOSTRIX, ADACEL) 10/09/2018    Zoster vaccine, recombinant, adult (SHINGRIX) 05/08/2019, 02/06/2020    Zoster, Unspecified 10/19/2022, 12/21/2022       Review of Systems  Review of systems is otherwise negative unless stated above or in history of present illness.    Objective   Visit Vitals  /72   Pulse 76   Temp 36.2 °C (97.2 °F)   Ht 1.676 m (5' 6\")   Wt 62.2 kg (137 lb 3.2 oz)   SpO2 96%   BMI 22.14 kg/m²   Smoking Status Former   BSA 1.7 m²     Physical Exam  Constitutional:       General: not in acute distress.   HENT:      Head: Normocephalic and atraumatic.      Nose: Nose normal.   Eyes:      Extraocular Movements: Extraocular movements intact.      Conjunctiva/sclera: Conjunctivae normal.   Cardiovascular:      Rate and Rhythm: Normal rate ,  No M/R/G  Pulmonary: Expiratory rhonchi     Effort: Pulmonary effort is normal.      Breath sounds: " Normal, Bilat Equal AE  Skin:     General: Skin is warm.   Neurological: Peripheral neuropathy     Mental Status: He is alert and oriented to person, place, and time.   Psychiatric:         Mood and Affect: Mood normal.         Behavior: Behavior normal.   Musculoskeletal arthritis under remission  FROM in all extremitirs,  Joint-no swelling or tenderness    Orders Only on 08/29/2024   Component Date Value Ref Range Status    [facility] Instr WBC 08/29/2024 4.3   Final    [facility] MCHC 08/29/2024 32.9  32.0 - 37.0 g/dL Final    [facility] MCV 08/29/2024 87.3  80.0 - 100.0 fL Final    [facility] HGB 08/29/2024 15.5  13.5 - 17.5 g/dL Final    [facility] MPV 08/29/2024 7.9  7.4 - 10.4 fL Final    [facility] RDW 08/29/2024 16.4 (H)  11.5 - 14.5 % Final    [facility] WBC 08/29/2024 4.3 (L)  4.5 - 11.0 x10 Final    [facility] MCH 08/29/2024 28.7  27.0 - 34.0 pg Final    [facility] Nucleated RBC 08/29/2024 0  /100WBC Final    [facility] HCT 08/29/2024 47.3  41.0 - 52.0 % Final    [facility] RBC 08/29/2024 5.42  4.70 - 6.10 x10 Final    [facility] Platelet 08/29/2024 187  150 - 450 x10 Final    [facility] DIFF? 08/29/2024 No   Final    [facility] Mono Count 08/29/2024 0.43  0.10 - 1.00 x1000 Final    [facility] Neutrophil Count (ANC) 08/29/2024 2.46  1.40 - 8.80 x1000 Final    [facility] Red Blood Cell Morpholo* 08/29/2024 See Notes (A)   Final    [facility] Lymph % 08/29/2024 28.2  % Final    [facility] Eosin % 08/29/2024 3.8  % Final    [facility] Eos Count 08/29/2024 0.16  0.00 - 0.50 x1000 Final    [facility] Lymph Count 08/29/2024 1.22  1.20 - 4.80 x1000 Final    [facility] Basos % 08/29/2024 0.8  % Final    [facility] Neutrophil % 08/29/2024 57.1  % Final    [facility] Mono % 08/29/2024 10.0  % Final    [facility] Baso Count 08/29/2024 0.04  0.00 - 0.20 x1000 Final    [facility] Prostatic Specific Ag S* 08/29/2024 1.3  0.0 - 4.0 ng/mL Final    [facility] TSH 08/29/2024 1.16  0.55 - 4.78 uIU/ml Final    [facility]  Magnesium 08/29/2024 2.1  1.6 - 2.6 mg/dL Final    [facility] Uric Acid 08/29/2024 4.1  3.7 - 9.2 mg/dL Final    [facility] ALB 08/29/2024 4.1  3.4 - 5.0 g/dL Final    [facility] GFR AA 08/29/2024 >60   Final    [facility] Bilirubin, Total 08/29/2024 1.20  0.30 - 1.20 mg/dL Final    [facility] Chloride 08/29/2024 109 (H)  98 - 107 mmol/L Final    [facility] A/G Ratio 08/29/2024 1.5   Final    [facility] Na 08/29/2024 142  135 - 145 mmol/L Final    [facility] BUN/Creat Ratio 08/29/2024 12.7   Final    [facility] GPT 08/29/2024 14  10 - 49 unit/L Final    [facility] Creatinine 08/29/2024 1.1  0.6 - 1.1 mg/dL Final    [facility] Alk Phos 08/29/2024 67  45 - 117 unit/L Final    [facility] Total Protein 08/29/2024 6.9  5.7 - 8.2 g/dL Final    [facility] CO2, venous 08/29/2024 27.0  20.0 - 31.0 mmol/L Final    [facility] Glomerular Filtration R* 08/29/2024 >60  mL/min/1.73m? Final    [facility] Calculated Osmolality 08/29/2024 283  275 - 295 mOsm/kg Final    [facility] K 08/29/2024 4.1  3.5 - 5.1 mmol/L Final    [facility] Globulin 08/29/2024 2.8  g/dL Final    [facility] BUN 08/29/2024 14  9 - 23 mg/dL Final    [facility] Calcium 08/29/2024 8.9  8.7 - 10.4 mg/dL Final    [facility] GOT 08/29/2024 19  15 - 37 unit/L Final    [facility] Glucose 08/29/2024 94  74 - 106 mg/dL Final    [facility] Calculated LDL Choleste* 08/29/2024 123  60 - 130 mg/dL Final    [facility] Total Chol/HDL Chol Rat* 08/29/2024 3.7   Final    [facility] Triglycerides 08/29/2024 119  30 - 150 mg/dL Final    [facility] HDL Cholesterol 08/29/2024 54  40 - 60 mg/dL Final    [facility] Cholesterol 08/29/2024 201 (H)  100 - 200 mg/dL Final    [facility] Microalbumin/Creatinine* 08/29/2024 16  0 - 30 mg MALB/gm CREAT Final    [facility] Creatinine, Urine mg/dl 08/29/2024 302.7  mg/dL Final    [facility] Microalbumin, Urine mg/L 08/29/2024 49.0  mg/L Final       Radiology: Reviewed imaging in powerchart.  No results found.      Charting was completed  using voice recognition technology and may include unintended errors.

## 2024-12-03 ENCOUNTER — LAB (OUTPATIENT)
Dept: LAB | Facility: LAB | Age: 69
End: 2024-12-03
Payer: COMMERCIAL

## 2024-12-03 DIAGNOSIS — E11.8 DIABETES MELLITUS WITH COMPLICATION IN ADULT PATIENT (MULTI): ICD-10-CM

## 2024-12-03 DIAGNOSIS — M10.00 IDIOPATHIC GOUT, UNSPECIFIED CHRONICITY, UNSPECIFIED SITE: ICD-10-CM

## 2024-12-03 DIAGNOSIS — C34.90 PRIMARY MALIGNANT NEOPLASM OF LUNG METASTATIC TO OTHER SITE, UNSPECIFIED LATERALITY (MULTI): ICD-10-CM

## 2024-12-03 LAB
ALBUMIN SERPL BCP-MCNC: 4.3 G/DL (ref 3.4–5)
ALP SERPL-CCNC: 74 U/L (ref 33–136)
ALT SERPL W P-5'-P-CCNC: 27 U/L (ref 10–52)
ANION GAP SERPL CALC-SCNC: 13 MMOL/L (ref 10–20)
AST SERPL W P-5'-P-CCNC: 22 U/L (ref 9–39)
BILIRUB SERPL-MCNC: 0.6 MG/DL (ref 0–1.2)
BUN SERPL-MCNC: 16 MG/DL (ref 6–23)
CALCIUM SERPL-MCNC: 9.2 MG/DL (ref 8.6–10.6)
CHLORIDE SERPL-SCNC: 110 MMOL/L (ref 98–107)
CO2 SERPL-SCNC: 25 MMOL/L (ref 21–32)
CREAT SERPL-MCNC: 1.07 MG/DL (ref 0.5–1.3)
CREAT UR-MCNC: 138.9 MG/DL (ref 20–370)
EGFRCR SERPLBLD CKD-EPI 2021: 74 ML/MIN/1.73M*2
EST. AVERAGE GLUCOSE BLD GHB EST-MCNC: 111 MG/DL
GLUCOSE SERPL-MCNC: 107 MG/DL (ref 74–99)
HBA1C MFR BLD: 5.5 %
MICROALBUMIN UR-MCNC: 41.4 MG/L
MICROALBUMIN/CREAT UR: 29.8 UG/MG CREAT
POTASSIUM SERPL-SCNC: 4.3 MMOL/L (ref 3.5–5.3)
PROT SERPL-MCNC: 6.8 G/DL (ref 6.4–8.2)
SODIUM SERPL-SCNC: 144 MMOL/L (ref 136–145)
URATE SERPL-MCNC: 4.2 MG/DL (ref 4–7.5)

## 2024-12-03 PROCEDURE — 82043 UR ALBUMIN QUANTITATIVE: CPT

## 2024-12-03 PROCEDURE — 36415 COLL VENOUS BLD VENIPUNCTURE: CPT

## 2024-12-03 PROCEDURE — 83036 HEMOGLOBIN GLYCOSYLATED A1C: CPT

## 2024-12-03 PROCEDURE — 80053 COMPREHEN METABOLIC PANEL: CPT

## 2024-12-03 PROCEDURE — 82570 ASSAY OF URINE CREATININE: CPT

## 2024-12-03 PROCEDURE — 84550 ASSAY OF BLOOD/URIC ACID: CPT

## 2024-12-05 ENCOUNTER — TELEPHONE (OUTPATIENT)
Dept: PRIMARY CARE | Facility: CLINIC | Age: 69
End: 2024-12-05
Payer: COMMERCIAL

## 2024-12-05 NOTE — TELEPHONE ENCOUNTER
----- Message from Lin Barbosa sent at 12/5/2024  7:59 AM EST -----  Glucose 107.  Hemoglobin A1c 5.5 excellent follow-up

## 2024-12-19 DIAGNOSIS — F32.A ANXIETY AND DEPRESSION: ICD-10-CM

## 2024-12-19 DIAGNOSIS — F41.9 ANXIETY AND DEPRESSION: ICD-10-CM

## 2024-12-20 RX ORDER — PAROXETINE HYDROCHLORIDE 20 MG/1
20 TABLET, FILM COATED ORAL
Qty: 90 TABLET | Refills: 3 | Status: SHIPPED | OUTPATIENT
Start: 2024-12-20

## 2025-01-15 ENCOUNTER — OFFICE VISIT (OUTPATIENT)
Dept: PRIMARY CARE | Facility: CLINIC | Age: 70
End: 2025-01-15
Payer: MEDICARE

## 2025-01-15 VITALS
BODY MASS INDEX: 22.5 KG/M2 | WEIGHT: 140 LBS | OXYGEN SATURATION: 96 % | HEART RATE: 53 BPM | TEMPERATURE: 98.8 F | SYSTOLIC BLOOD PRESSURE: 152 MMHG | HEIGHT: 66 IN | DIASTOLIC BLOOD PRESSURE: 78 MMHG

## 2025-01-15 DIAGNOSIS — J00 ACUTE RHINITIS: ICD-10-CM

## 2025-01-15 DIAGNOSIS — J02.9 PHARYNGITIS, UNSPECIFIED ETIOLOGY: Primary | ICD-10-CM

## 2025-01-15 PROCEDURE — 99213 OFFICE O/P EST LOW 20 MIN: CPT | Performed by: STUDENT IN AN ORGANIZED HEALTH CARE EDUCATION/TRAINING PROGRAM

## 2025-01-15 PROCEDURE — 3078F DIAST BP <80 MM HG: CPT | Performed by: STUDENT IN AN ORGANIZED HEALTH CARE EDUCATION/TRAINING PROGRAM

## 2025-01-15 PROCEDURE — 3008F BODY MASS INDEX DOCD: CPT | Performed by: STUDENT IN AN ORGANIZED HEALTH CARE EDUCATION/TRAINING PROGRAM

## 2025-01-15 PROCEDURE — 4010F ACE/ARB THERAPY RXD/TAKEN: CPT | Performed by: STUDENT IN AN ORGANIZED HEALTH CARE EDUCATION/TRAINING PROGRAM

## 2025-01-15 PROCEDURE — 1157F ADVNC CARE PLAN IN RCRD: CPT | Performed by: STUDENT IN AN ORGANIZED HEALTH CARE EDUCATION/TRAINING PROGRAM

## 2025-01-15 PROCEDURE — 1036F TOBACCO NON-USER: CPT | Performed by: STUDENT IN AN ORGANIZED HEALTH CARE EDUCATION/TRAINING PROGRAM

## 2025-01-15 PROCEDURE — 1159F MED LIST DOCD IN RCRD: CPT | Performed by: STUDENT IN AN ORGANIZED HEALTH CARE EDUCATION/TRAINING PROGRAM

## 2025-01-15 PROCEDURE — 1123F ACP DISCUSS/DSCN MKR DOCD: CPT | Performed by: STUDENT IN AN ORGANIZED HEALTH CARE EDUCATION/TRAINING PROGRAM

## 2025-01-15 PROCEDURE — 1160F RVW MEDS BY RX/DR IN RCRD: CPT | Performed by: STUDENT IN AN ORGANIZED HEALTH CARE EDUCATION/TRAINING PROGRAM

## 2025-01-15 PROCEDURE — 3077F SYST BP >= 140 MM HG: CPT | Performed by: STUDENT IN AN ORGANIZED HEALTH CARE EDUCATION/TRAINING PROGRAM

## 2025-01-15 RX ORDER — FLUTICASONE PROPIONATE 50 MCG
1 SPRAY, SUSPENSION (ML) NASAL DAILY
Qty: 16 G | Refills: 11 | Status: SHIPPED | OUTPATIENT
Start: 2025-01-15 | End: 2026-01-15

## 2025-01-15 RX ORDER — AMOXICILLIN AND CLAVULANATE POTASSIUM 875; 125 MG/1; MG/1
875 TABLET, FILM COATED ORAL 2 TIMES DAILY
Qty: 14 TABLET | Refills: 0 | Status: SHIPPED | OUTPATIENT
Start: 2025-01-15 | End: 2025-01-22

## 2025-01-15 NOTE — PROGRESS NOTES
"Subjective   Patient ID: Fortunato Romano is a 69 y.o. male who presents for the following    Assessment/Plan   #Acute Pharyngitis  #Acute Rhinitis  -Centor criteria <2, low c/f for strep pharyngitis, no indication to test, suspect likely viral pharyngitis however d/t duration of symptoms will treat as bacterial aswell.   Plan:  -Flonase once daily   -Augmentin prescribed  -Supportive care w/ lozenges, maintain adequate hydration  -return precautions    #Hx of Lung Ca  -Currently on Tagrisso  -Continue follow up with heme/onc     HPI  70 yo male with history of gout, HLD, HTN, anxiety/depression, lung ca who presents for evaluation of sore throat.   He reports 10d of sore throat associated with headache. Denies any cough, lymph node swelling, no fevers recorded.  He has been trying tylenol and nasal spray with no help.  Does report having this usually once a year but typically goes away relatively quickly.    Denies chills, weight loss, lightheadedness, dizziness, vision changes, runny nose, CP, SOB, cough, palpitations, n/v/d, abd pain, black/bloody stools, or new numbness/weakness/tingling in arms/legs/face.    PMHx: gout, HLD, HTN, anxiety/depression, lung ca   PSHx: H/o brain surgery, jaw surgery   FMHx: Family h/o stomach cancer   Social Hx  T: Former smoker  A: Denies  D: Denies    Visit Vitals  /78   Pulse 53   Temp 37.1 °C (98.8 °F)   Ht 1.676 m (5' 6\")   Wt 63.5 kg (140 lb)   SpO2 96%   BMI 22.60 kg/m²   Smoking Status Former   BSA 1.72 m²     PHYSICAL EXAM   General: NAD  Skin: Warm and dry  ENT: NC; membranes pink and moist; Fair dentition. Mild posterior pharyngeal erythema. TM clear BL. No CLAD. No supraclav LAD.   Head/Neck: NC; supple, non-rigid, no JVD  CV: regular rate and rhythm, no murmurs  RESP: no resp distress on room air  Abd: soft, nontender, nondistended  Neuro: alert and oriented x3  Extremities: no peripheral edema appreciated, cap refill <2s    REVIEW OF SYSTEMS   ROS: 12 systems " reviewed and negative except per HPI above    Allergies   Allergen Reactions    Pollen Extracts Unknown       Current Outpatient Medications   Medication Sig Dispense Refill    allopurinol (Zyloprim) 300 mg tablet TAKE ONE TABLET BY MOUTH EVERY DAY 90 tablet 3    gabapentin (Neurontin) 100 mg capsule TAKE ONE CAPSULE BY MOUTH TWICE A DAY 60 capsule 2    olmesartan (BENIcar) 40 mg tablet TAKE ONE TABLET BY MOUTH EVERY DAY 90 tablet 3    osimertinib (Tagrisso) 80 mg tablet 1 tabs, ORAL, DAILY, 30 tabs, Date: 4/24/24 11:11:00 AM EDT, Tablet      PARoxetine (Paxil) 20 mg tablet TAKE ONE TABLET BY MOUTH EVERY MORNING 90 tablet 3     No current facility-administered medications for this visit.       Objective     Lab on 12/03/2024   Component Date Value Ref Range Status    Hemoglobin A1C 12/03/2024 5.5  See comment % Final    Estimated Average Glucose 12/03/2024 111  Not Established mg/dL Final    Albumin, Urine Random 12/03/2024 41.4  Not established mg/L Final    Creatinine, Urine Random 12/03/2024 138.9  20.0 - 370.0 mg/dL Final    Albumin/Creatinine Ratio 12/03/2024 29.8  <30.0 ug/mg Creat Final    Glucose 12/03/2024 107 (H)  74 - 99 mg/dL Final    Sodium 12/03/2024 144  136 - 145 mmol/L Final    Potassium 12/03/2024 4.3  3.5 - 5.3 mmol/L Final    Chloride 12/03/2024 110 (H)  98 - 107 mmol/L Final    Bicarbonate 12/03/2024 25  21 - 32 mmol/L Final    Anion Gap 12/03/2024 13  10 - 20 mmol/L Final    Urea Nitrogen 12/03/2024 16  6 - 23 mg/dL Final    Creatinine 12/03/2024 1.07  0.50 - 1.30 mg/dL Final    eGFR 12/03/2024 74  >60 mL/min/1.73m*2 Final    Calcium 12/03/2024 9.2  8.6 - 10.6 mg/dL Final    Albumin 12/03/2024 4.3  3.4 - 5.0 g/dL Final    Alkaline Phosphatase 12/03/2024 74  33 - 136 U/L Final    Total Protein 12/03/2024 6.8  6.4 - 8.2 g/dL Final    AST 12/03/2024 22  9 - 39 U/L Final    Bilirubin, Total 12/03/2024 0.6  0.0 - 1.2 mg/dL Final    ALT 12/03/2024 27  10 - 52 U/L Final    Uric Acid 12/03/2024 4.2   4.0 - 7.5 mg/dL Final       Radiology: Reviewed imaging in powerchart.  No results found.    Family History   Problem Relation Name Age of Onset    Stomach cancer Mother      Stomach cancer Sister      Brain cancer Son       Social History     Socioeconomic History    Marital status:    Tobacco Use    Smoking status: Former     Types: Cigarettes    Smokeless tobacco: Never   Substance and Sexual Activity    Alcohol use: Yes     Comment: occasional    Drug use: Never     Past Medical History:   Diagnosis Date    Benign prostatic hyperplasia without lower urinary tract symptoms 05/25/2021    Enlarged prostate without lower urinary tract symptoms (luts)    Encounter for screening for malignant neoplasm of respiratory organs 05/02/2022    Encounter for screening for lung cancer    Essential (primary) hypertension 05/13/2021    Benign essential hypertension    Generalized anxiety disorder 05/25/2021    VIVEK (generalized anxiety disorder)    Mixed hyperlipidemia 05/13/2021    Hyperlipidemia, mixed    Other abnormal glucose     Elevated glucose    Other conditions influencing health status     Postoperative seroma    Other conditions influencing health status 02/05/2018    History of cough    Other specified soft tissue disorders 12/14/2017    Soft tissue mass    Pain in left knee 05/30/2017    Left knee pain    Personal history of benign neoplasm of the brain 05/13/2021    History of benign neoplasm of brain    Personal history of neoplasm of uncertain behavior 05/07/2018    History of neoplasm of uncertain behavior    Personal history of other diseases of the circulatory system 05/08/2019    History of hypertension    Personal history of other diseases of the digestive system 05/02/2022    History of fatty infiltration of liver    Personal history of other diseases of the musculoskeletal system and connective tissue 07/05/2022    History of polymyalgia rheumatica    Personal history of other diseases of the  musculoskeletal system and connective tissue 11/04/2021    History of gout    Personal history of other diseases of the respiratory system 05/02/2022    History of sinusitis    Personal history of other diseases of the respiratory system 02/05/2018    History of upper respiratory infection    Personal history of other endocrine, nutritional and metabolic disease 11/04/2021    History of diabetes mellitus    Personal history of other endocrine, nutritional and metabolic disease 05/08/2019    History of hyperlipidemia    Personal history of other endocrine, nutritional and metabolic disease 05/25/2021    History of vitamin D deficiency    Personal history of other specified conditions 02/13/2018    History of ataxia    Personal history of other specified conditions 07/22/2016    History of urinary frequency    Personal history of other specified conditions 07/22/2016    History of fever    Personal history of other specified conditions 11/20/2017    History of bradycardia    Vitamin D deficiency, unspecified 09/08/2015    Vitamin D deficiency     Past Surgical History:   Procedure Laterality Date    MANDIBLE SURGERY  11/01/2017    Jaw Surgery    OTHER SURGICAL HISTORY  07/22/2016    Brain Surgery       Charting was completed using voice recognition technology and may include unintended errors.

## 2025-01-30 DIAGNOSIS — M19.90 ARTHRITIS: ICD-10-CM

## 2025-01-31 RX ORDER — GABAPENTIN 100 MG/1
100 CAPSULE ORAL 2 TIMES DAILY
Qty: 60 CAPSULE | Refills: 2 | Status: SHIPPED | OUTPATIENT
Start: 2025-01-31

## 2025-02-06 ENCOUNTER — APPOINTMENT (OUTPATIENT)
Dept: PRIMARY CARE | Facility: CLINIC | Age: 70
End: 2025-02-06
Payer: COMMERCIAL

## 2025-02-06 VITALS
HEIGHT: 66 IN | BODY MASS INDEX: 22.5 KG/M2 | SYSTOLIC BLOOD PRESSURE: 128 MMHG | WEIGHT: 140 LBS | HEART RATE: 79 BPM | OXYGEN SATURATION: 97 % | TEMPERATURE: 97.9 F | DIASTOLIC BLOOD PRESSURE: 66 MMHG

## 2025-02-06 DIAGNOSIS — I10 BENIGN ESSENTIAL HYPERTENSION: ICD-10-CM

## 2025-02-06 DIAGNOSIS — M10.00 IDIOPATHIC GOUT, UNSPECIFIED CHRONICITY, UNSPECIFIED SITE: ICD-10-CM

## 2025-02-06 DIAGNOSIS — Z00.00 MEDICARE ANNUAL WELLNESS VISIT, SUBSEQUENT: Primary | ICD-10-CM

## 2025-02-06 DIAGNOSIS — C34.90 PRIMARY MALIGNANT NEOPLASM OF LUNG METASTATIC TO OTHER SITE, UNSPECIFIED LATERALITY (MULTI): ICD-10-CM

## 2025-02-06 DIAGNOSIS — F32.A ANXIETY AND DEPRESSION: ICD-10-CM

## 2025-02-06 DIAGNOSIS — F41.9 ANXIETY AND DEPRESSION: ICD-10-CM

## 2025-02-06 PROBLEM — I15.2 HYPERTENSION ASSOCIATED WITH DIABETES (MULTI): Status: RESOLVED | Noted: 2023-03-09 | Resolved: 2025-02-06

## 2025-02-06 PROBLEM — E78.5 HYPERLIPIDEMIA ASSOCIATED WITH TYPE 2 DIABETES MELLITUS (MULTI): Status: RESOLVED | Noted: 2023-03-09 | Resolved: 2025-02-06

## 2025-02-06 PROBLEM — E11.69 HYPERLIPIDEMIA ASSOCIATED WITH TYPE 2 DIABETES MELLITUS (MULTI): Status: RESOLVED | Noted: 2023-03-09 | Resolved: 2025-02-06

## 2025-02-06 PROBLEM — E11.59 HYPERTENSION ASSOCIATED WITH DIABETES (MULTI): Status: RESOLVED | Noted: 2023-03-09 | Resolved: 2025-02-06

## 2025-02-06 PROCEDURE — 3008F BODY MASS INDEX DOCD: CPT | Performed by: INTERNAL MEDICINE

## 2025-02-06 PROCEDURE — 3078F DIAST BP <80 MM HG: CPT | Performed by: INTERNAL MEDICINE

## 2025-02-06 PROCEDURE — 1123F ACP DISCUSS/DSCN MKR DOCD: CPT | Performed by: INTERNAL MEDICINE

## 2025-02-06 PROCEDURE — 1157F ADVNC CARE PLAN IN RCRD: CPT | Performed by: INTERNAL MEDICINE

## 2025-02-06 PROCEDURE — G0439 PPPS, SUBSEQ VISIT: HCPCS | Performed by: INTERNAL MEDICINE

## 2025-02-06 PROCEDURE — 3074F SYST BP LT 130 MM HG: CPT | Performed by: INTERNAL MEDICINE

## 2025-02-06 PROCEDURE — 1159F MED LIST DOCD IN RCRD: CPT | Performed by: INTERNAL MEDICINE

## 2025-02-06 PROCEDURE — 1170F FXNL STATUS ASSESSED: CPT | Performed by: INTERNAL MEDICINE

## 2025-02-06 PROCEDURE — 1036F TOBACCO NON-USER: CPT | Performed by: INTERNAL MEDICINE

## 2025-02-06 PROCEDURE — 1160F RVW MEDS BY RX/DR IN RCRD: CPT | Performed by: INTERNAL MEDICINE

## 2025-02-06 PROCEDURE — 99497 ADVNCD CARE PLAN 30 MIN: CPT | Performed by: INTERNAL MEDICINE

## 2025-02-06 PROCEDURE — 99213 OFFICE O/P EST LOW 20 MIN: CPT | Performed by: INTERNAL MEDICINE

## 2025-02-06 ASSESSMENT — ACTIVITIES OF DAILY LIVING (ADL)
MANAGING_FINANCES: INDEPENDENT
BATHING: INDEPENDENT
TAKING_MEDICATION: INDEPENDENT
GROCERY_SHOPPING: INDEPENDENT
DOING_HOUSEWORK: INDEPENDENT
DRESSING: INDEPENDENT

## 2025-02-06 ASSESSMENT — PATIENT HEALTH QUESTIONNAIRE - PHQ9
10. IF YOU CHECKED OFF ANY PROBLEMS, HOW DIFFICULT HAVE THESE PROBLEMS MADE IT FOR YOU TO DO YOUR WORK, TAKE CARE OF THINGS AT HOME, OR GET ALONG WITH OTHER PEOPLE: SOMEWHAT DIFFICULT
1. LITTLE INTEREST OR PLEASURE IN DOING THINGS: SEVERAL DAYS
SUM OF ALL RESPONSES TO PHQ9 QUESTIONS 1 AND 2: 2
2. FEELING DOWN, DEPRESSED OR HOPELESS: SEVERAL DAYS

## 2025-02-06 NOTE — PROGRESS NOTES
Assessment and Plan:  Problem List Items Addressed This Visit    None        Chief Complaint:   Annual Medicare Wellness Office Exam/Comprehensive Problem Focused Follow Up and Physical Exam    Glucose 107.  Hemoglobin A1c 5.5 excellent follow-up   Benign neoplasm of brain, infratentorial Confirmed     Active       BPH (benign prostatic hypertrophy) Confirmed     Active   patient   Ex-smoker Confirmed     Resolved   patient   HTN (hypertension) Confirmed     Active   patient   Brain tumor Confirmed     Active       Cancer of upper lobe of left lung Confirmed     Active       Cancer of upper lobe of left lung Confirmed     Active     Hemoglobin A1c was 5.8 before  HPI: This is a 69-year-old patient  with a 2 children    1 brother for sister    Mother  from old age    Father of stomach cancer    Ex-smoker social drinker    History of the lung cancer left upper lobe with metastasis seen by the oncology no chemoradiation getting the immunotherapy    History of the meningioma of the brain status post surgery    Osteoarthritic gouty arthritis allergic rhinitis hypertension hyperlipidemia anxiety depression    Continue poor appetite lose weight advised to discuss with the oncology dietitian nutrition will be given continue boost or Remeron    Negative for hemoptysis hypoxia hypotension or fall    Negative for hematuria rectal bleeding    Negative for seizure    Discussed about depression and dementia with patient        Patient Active Problem List:  Patient Active Problem List   Diagnosis    Gout    Hyperlipidemia associated with type 2 diabetes mellitus (Multi)    Hypertension associated with diabetes (Multi)    Anxiety and depression    Lung cancer, primary, with metastasis from lung to other site (Multi)      Procedures  Procedure Date Related Diagnosis Body Site Status   bronchoscopy 3/28/24     Completed   Suboccipital craniotomy and resection of cerebellar lesion1 11/3/14     Completed   Aspiration  Ultrasound Guided.       Completed   Excision, tumor, soft tissue of upper arm or elbow area, subfascial (eg, intramuscular); 5 cm or greater       Completed   plate in jaw; right-sided       Completed   screw Right shoulder       Completed   Spinal puncture, lumbar, diagnostic       Completed   1Performed by Dr. Meza at      Comprehensive Medical/Surgical/Social/Family History:  Family History   Problem Relation Name Age of Onset    Stomach cancer Mother      Stomach cancer Sister      Brain cancer Son         Past Medical History:   Diagnosis Date    Benign prostatic hyperplasia without lower urinary tract symptoms 05/25/2021    Enlarged prostate without lower urinary tract symptoms (luts)    Encounter for screening for malignant neoplasm of respiratory organs 05/02/2022    Encounter for screening for lung cancer    Essential (primary) hypertension 05/13/2021    Benign essential hypertension    Generalized anxiety disorder 05/25/2021    VIVEK (generalized anxiety disorder)    Mixed hyperlipidemia 05/13/2021    Hyperlipidemia, mixed    Other abnormal glucose     Elevated glucose    Other conditions influencing health status     Postoperative seroma    Other conditions influencing health status 02/05/2018    History of cough    Other specified soft tissue disorders 12/14/2017    Soft tissue mass    Pain in left knee 05/30/2017    Left knee pain    Personal history of benign neoplasm of the brain 05/13/2021    History of benign neoplasm of brain    Personal history of neoplasm of uncertain behavior 05/07/2018    History of neoplasm of uncertain behavior    Personal history of other diseases of the circulatory system 05/08/2019    History of hypertension    Personal history of other diseases of the digestive system 05/02/2022    History of fatty infiltration of liver    Personal history of other diseases of the musculoskeletal system and connective tissue 07/05/2022    History of polymyalgia rheumatica     Personal history of other diseases of the musculoskeletal system and connective tissue 11/04/2021    History of gout    Personal history of other diseases of the respiratory system 05/02/2022    History of sinusitis    Personal history of other diseases of the respiratory system 02/05/2018    History of upper respiratory infection    Personal history of other endocrine, nutritional and metabolic disease 11/04/2021    History of diabetes mellitus    Personal history of other endocrine, nutritional and metabolic disease 05/08/2019    History of hyperlipidemia    Personal history of other endocrine, nutritional and metabolic disease 05/25/2021    History of vitamin D deficiency    Personal history of other specified conditions 02/13/2018    History of ataxia    Personal history of other specified conditions 07/22/2016    History of urinary frequency    Personal history of other specified conditions 07/22/2016    History of fever    Personal history of other specified conditions 11/20/2017    History of bradycardia    Vitamin D deficiency, unspecified 09/08/2015    Vitamin D deficiency       Past Surgical History:   Procedure Laterality Date    MANDIBLE SURGERY  11/01/2017    Jaw Surgery    OTHER SURGICAL HISTORY  07/22/2016    Brain Surgery       Social History     Socioeconomic History    Marital status:    Tobacco Use    Smoking status: Former     Types: Cigarettes    Smokeless tobacco: Never   Substance and Sexual Activity    Alcohol use: Yes     Comment: occasional    Drug use: Never       Tobacco/Alcohol/Opioid use, as well as Illicit Drug Use was screened for/reviewed and documented in Social Documentation section of the chart and medication list as appropriate    Allergies and Medications  Allergies   Allergen Reactions    Pollen Extracts Unknown     Current Outpatient Medications   Medication Sig Dispense Refill    allopurinol (Zyloprim) 300 mg tablet TAKE ONE TABLET BY MOUTH EVERY DAY 90 tablet 3     fluticasone (Flonase) 50 mcg/actuation nasal spray Administer 1 spray into each nostril once daily. Shake gently. Before first use, prime pump. After use, clean tip and replace cap. 16 g 11    gabapentin (Neurontin) 100 mg capsule TAKE ONE CAPSULE BY MOUTH TWICE A DAY 60 capsule 2    olmesartan (BENIcar) 40 mg tablet TAKE ONE TABLET BY MOUTH EVERY DAY 90 tablet 3    osimertinib (Tagrisso) 80 mg tablet 1 tabs, ORAL, DAILY, 30 tabs, Date: 4/24/24 11:11:00 AM EDT, Tablet      PARoxetine (Paxil) 20 mg tablet TAKE ONE TABLET BY MOUTH EVERY MORNING 90 tablet 3     No current facility-administered medications for this visit.       Medications and Supplements  prescribed by me and other practitioners or clinical pharmacist (such as prescriptions, OTC's, herbal therapies and supplements) were reviewed and documented in the medical record.     Advance directives  Advanced Care Planning (including a Living Will, Healthcare POA, as well as specific end of life choices and/or directives), was discussed for approximately 16 minutes with the patient and/or surrogate, voluntarily, and documented in the Problem List of the medical record.     Cardiac Risk Assessment  Cardiovascular risk was discussed and, if needed, lifestyle modifications recommended, including nutritional choices, exercise, and elimination of habits contributing to risk. We agreed on a plan to reduce the current cardiovascular risk based on above discussion as needed.  Aspirin use/disuse was discussed and documented in the Problem List of the medical record after reviewing the updated guidelines below:    Consider low dose Aspirin ( mg) use if the benefit for cardiovascular disease prevention outweighs risk for bleeding complications.   In general, low dose ASA should be considered:  In patients WITHOUT prior MI/stroke/PAD (primary prevention):   a. Age <60: Use if 10-year cardiovascular disease risk >20%, with discussion of risks and benefits with  "patient  b. Age 60-<70: Use if 10-year cardiovascular disease risk >20% and low bleeding (e.g., gastrointenstinal) risk, with discussion of risks and benefits with patient  c. Age >=70: Do not use    In patients WITH prior MI/stroke/PAD (secondary prevention):   Generally use unless extremely high bleeding (e.g., gastrointenstinal) risk, with discussion of risks and benefits with patient    ROS otherwise negative aside from what was mentioned above in HPI.    Visit Vitals  /78   Pulse 79   Temp 36.6 °C (97.9 °F)   Ht 1.676 m (5' 6\")   Wt 63.5 kg (140 lb)   SpO2 97%   BMI 22.60 kg/m²   Smoking Status Former   BSA 1.72 m²       Physical Exam.physical  Physical Exam:    Appearance: Alert, oriented , cooperative,  in no acute distress. Well nourished & well hydrated.    Skin: Intact,  dry skin, no lesions, rash, petechiae or purpura.     Eyes: PERRLA, EOMs intact,  Conjunctiva pink with no redness or exudates. Cornea & anterior chamber are clear, Eyelids without lesions. No scleral icterus.     ENT: Hearing grossly intact. External auditory canals patent, tympanic membranes intact with visible landmarks. Nares patent, mucus membranes moist. Dentition without lesions. Pharynx clear, uvula midline.     Neck: Supple, without meningismus. Thyroid not palpable. Trachea at midline. No lymphadenopathy.    Pulmonary: Barrel chest crackles rales rhonchi    Cardiac: Heart murmur s.    Abdomen: Epigastric tenderness.    Genitourinary: Exam deferred.    Musculoskeletal: Gouty arthritis other arthritis control    Neurological: Nonspecific dizziness without fall    Psychiatric: Appropriate mood and affect.         During the course of the visit the patient was educated and counseled about age appropriate screening and preventive services. Completed preventive screenings were documented in the chart and orders were placed for outstanding screenings/procedures as documented in the Assessment and Plan.    Patient Instructions (the " written plan) was given to the patient at check out.    Charting was completed using voice recognition technology and may include unintended errors.

## 2025-02-07 LAB
ERYTHROCYTE [SEDIMENTATION RATE] IN BLOOD BY WESTERGREN METHOD: 2 MM/H
URATE SERPL-MCNC: 4.2 MG/DL (ref 4–8)

## 2025-03-18 ENCOUNTER — OFFICE VISIT (OUTPATIENT)
Dept: PRIMARY CARE | Facility: CLINIC | Age: 70
End: 2025-03-18
Payer: MEDICARE

## 2025-03-18 ENCOUNTER — TELEPHONE (OUTPATIENT)
Dept: PRIMARY CARE | Facility: CLINIC | Age: 70
End: 2025-03-18
Payer: MEDICARE

## 2025-03-18 VITALS
WEIGHT: 142 LBS | DIASTOLIC BLOOD PRESSURE: 73 MMHG | HEIGHT: 66 IN | HEART RATE: 84 BPM | OXYGEN SATURATION: 96 % | BODY MASS INDEX: 22.82 KG/M2 | TEMPERATURE: 97.1 F | SYSTOLIC BLOOD PRESSURE: 129 MMHG

## 2025-03-18 DIAGNOSIS — J43.1 PANLOBULAR EMPHYSEMA (MULTI): Primary | ICD-10-CM

## 2025-03-18 DIAGNOSIS — M1A.2710 DRUG-INDUCED CHRONIC GOUT OF RIGHT FOOT WITHOUT TOPHUS: ICD-10-CM

## 2025-03-18 DIAGNOSIS — C34.91 PRIMARY MALIGNANT NEOPLASM OF RIGHT LUNG METASTATIC TO OTHER SITE (MULTI): ICD-10-CM

## 2025-03-18 DIAGNOSIS — M19.90 ARTHRITIS: ICD-10-CM

## 2025-03-18 DIAGNOSIS — E11.59 HYPERTENSION ASSOCIATED WITH DIABETES (MULTI): ICD-10-CM

## 2025-03-18 DIAGNOSIS — I15.2 HYPERTENSION ASSOCIATED WITH DIABETES (MULTI): ICD-10-CM

## 2025-03-18 DIAGNOSIS — I10 BENIGN ESSENTIAL HYPERTENSION: ICD-10-CM

## 2025-03-18 DIAGNOSIS — F41.9 ANXIETY AND DEPRESSION: ICD-10-CM

## 2025-03-18 DIAGNOSIS — F32.A ANXIETY AND DEPRESSION: ICD-10-CM

## 2025-03-18 PROBLEM — Z00.00 MEDICARE ANNUAL WELLNESS VISIT, SUBSEQUENT: Status: RESOLVED | Noted: 2023-07-31 | Resolved: 2025-03-18

## 2025-03-18 PROCEDURE — 1157F ADVNC CARE PLAN IN RCRD: CPT | Performed by: INTERNAL MEDICINE

## 2025-03-18 PROCEDURE — G2211 COMPLEX E/M VISIT ADD ON: HCPCS | Performed by: INTERNAL MEDICINE

## 2025-03-18 PROCEDURE — 99214 OFFICE O/P EST MOD 30 MIN: CPT | Performed by: INTERNAL MEDICINE

## 2025-03-18 PROCEDURE — 1036F TOBACCO NON-USER: CPT | Performed by: INTERNAL MEDICINE

## 2025-03-18 PROCEDURE — 3074F SYST BP LT 130 MM HG: CPT | Performed by: INTERNAL MEDICINE

## 2025-03-18 PROCEDURE — 1160F RVW MEDS BY RX/DR IN RCRD: CPT | Performed by: INTERNAL MEDICINE

## 2025-03-18 PROCEDURE — 1159F MED LIST DOCD IN RCRD: CPT | Performed by: INTERNAL MEDICINE

## 2025-03-18 PROCEDURE — 1123F ACP DISCUSS/DSCN MKR DOCD: CPT | Performed by: INTERNAL MEDICINE

## 2025-03-18 PROCEDURE — 4010F ACE/ARB THERAPY RXD/TAKEN: CPT | Performed by: INTERNAL MEDICINE

## 2025-03-18 PROCEDURE — 3078F DIAST BP <80 MM HG: CPT | Performed by: INTERNAL MEDICINE

## 2025-03-18 PROCEDURE — 3008F BODY MASS INDEX DOCD: CPT | Performed by: INTERNAL MEDICINE

## 2025-03-18 RX ORDER — OLMESARTAN MEDOXOMIL 40 MG/1
40 TABLET ORAL DAILY
Qty: 90 TABLET | Refills: 3 | Status: SHIPPED | OUTPATIENT
Start: 2025-03-18

## 2025-03-18 RX ORDER — PAROXETINE HYDROCHLORIDE 20 MG/1
20 TABLET, FILM COATED ORAL
Qty: 90 TABLET | Refills: 3 | Status: SHIPPED | OUTPATIENT
Start: 2025-03-18

## 2025-03-18 RX ORDER — FLUTICASONE FUROATE, UMECLIDINIUM BROMIDE AND VILANTEROL TRIFENATATE 100; 62.5; 25 UG/1; UG/1; UG/1
1 POWDER RESPIRATORY (INHALATION) DAILY
Qty: 60 EACH | Refills: 3 | Status: SHIPPED | OUTPATIENT
Start: 2025-03-18

## 2025-03-18 RX ORDER — AZITHROMYCIN 250 MG/1
TABLET, FILM COATED ORAL
Qty: 6 TABLET | Refills: 0 | Status: SHIPPED | OUTPATIENT
Start: 2025-03-18 | End: 2025-03-23

## 2025-03-18 RX ORDER — GABAPENTIN 100 MG/1
100 CAPSULE ORAL 2 TIMES DAILY
Qty: 60 CAPSULE | Refills: 2 | Status: SHIPPED | OUTPATIENT
Start: 2025-03-18

## 2025-03-18 RX ORDER — METHYLPREDNISOLONE 4 MG/1
TABLET ORAL
Qty: 21 TABLET | Refills: 0 | Status: SHIPPED | OUTPATIENT
Start: 2025-03-18 | End: 2025-03-24

## 2025-03-18 RX ORDER — ALLOPURINOL 300 MG/1
300 TABLET ORAL DAILY
Qty: 90 TABLET | Refills: 3 | Status: SHIPPED | OUTPATIENT
Start: 2025-03-18

## 2025-03-18 ASSESSMENT — PATIENT HEALTH QUESTIONNAIRE - PHQ9
1. LITTLE INTEREST OR PLEASURE IN DOING THINGS: NOT AT ALL
2. FEELING DOWN, DEPRESSED OR HOPELESS: NOT AT ALL
SUM OF ALL RESPONSES TO PHQ9 QUESTIONS 1 AND 2: 0

## 2025-03-18 NOTE — PROGRESS NOTES
Subjective   Patient ID: Fortunato Romano is a 69 y.o. male who presents for Fatigue (1 week now ) and Shortness of Breath.    Assessment/Plan     Problem List Items Addressed This Visit       Gout     Not in pain continue allopurinol 300 mg a day gabapentin 100 mg a day         Relevant Medications    allopurinol (Zyloprim) 300 mg tablet    Anxiety and depression     Not suicidal continue Paxil 20 mg a day PHQ 4         Relevant Medications    PARoxetine (Paxil) 20 mg tablet    Lung cancer, primary, with metastasis from lung to other site (Multi)     Lung cancer with the pneumothorax seen by oncology continue follow-up current medication         Benign essential hypertension     Continue Benicar 40 mg a day check BMP twice a year         Panlobular emphysema (Multi) - Primary     Other Visit Diagnoses       Hypertension associated with diabetes (Multi)        Relevant Medications    olmesartan (BENIcar) 40 mg tablet    Arthritis        Relevant Medications    gabapentin (Neurontin) 100 mg capsule          Patient was evaluated today, problem list was reviewed, problems and concerns addressed, Rx list reviewed and updated, lab and tests were noted and reviewed. Life style changes were discussed, always it works better if we eat plant based diet and plenty of fibres and roughage. Consume adequate amount of water and avoid alcohol, light to moderate physical activities and stress reduction are always beneficial for ongoing physical well being. Do not forget to have 6 to 7 hours of sleep regularly and avoid late night jasson screen exposure.    HPI 69-year-old patient of lung cancer survival history of osteoarthritic gouty other neuralgia hypertension hyperlipidemia anxiety depression complains of cough congestion shortness of breath went to see  Pulmonary oncology service x-ray shows a position of the Vicryl pneumothorax with no tension pneumothorax advised to get a Zithromax Medrol Dosepak Trelegy inhaler 1 puff a day  problem continue does not get admitted to get ABG on room air will be oxygen will help follow-up with Dr. Quiroz pulmonary service    Negative for hypoxia hemoptysis or weight loss.  Negative for DVT or PE  Past Medical History:   Diagnosis Date    Benign prostatic hyperplasia without lower urinary tract symptoms 05/25/2021    Enlarged prostate without lower urinary tract symptoms (luts)    Encounter for screening for malignant neoplasm of respiratory organs 05/02/2022    Encounter for screening for lung cancer    Essential (primary) hypertension 05/13/2021    Benign essential hypertension    Generalized anxiety disorder 05/25/2021    VIVEK (generalized anxiety disorder)    Mixed hyperlipidemia 05/13/2021    Hyperlipidemia, mixed    Other abnormal glucose     Elevated glucose    Other conditions influencing health status     Postoperative seroma    Other conditions influencing health status 02/05/2018    History of cough    Other specified soft tissue disorders 12/14/2017    Soft tissue mass    Pain in left knee 05/30/2017    Left knee pain    Personal history of benign neoplasm of the brain 05/13/2021    History of benign neoplasm of brain    Personal history of neoplasm of uncertain behavior 05/07/2018    History of neoplasm of uncertain behavior    Personal history of other diseases of the circulatory system 05/08/2019    History of hypertension    Personal history of other diseases of the digestive system 05/02/2022    History of fatty infiltration of liver    Personal history of other diseases of the musculoskeletal system and connective tissue 07/05/2022    History of polymyalgia rheumatica    Personal history of other diseases of the musculoskeletal system and connective tissue 11/04/2021    History of gout    Personal history of other diseases of the respiratory system 05/02/2022    History of sinusitis    Personal history of other diseases of the respiratory system 02/05/2018    History of upper respiratory  infection    Personal history of other endocrine, nutritional and metabolic disease 11/04/2021    History of diabetes mellitus    Personal history of other endocrine, nutritional and metabolic disease 05/08/2019    History of hyperlipidemia    Personal history of other endocrine, nutritional and metabolic disease 05/25/2021    History of vitamin D deficiency    Personal history of other specified conditions 02/13/2018    History of ataxia    Personal history of other specified conditions 07/22/2016    History of urinary frequency    Personal history of other specified conditions 07/22/2016    History of fever    Personal history of other specified conditions 11/20/2017    History of bradycardia    Vitamin D deficiency, unspecified 09/08/2015    Vitamin D deficiency     Past Surgical History:   Procedure Laterality Date    MANDIBLE SURGERY  11/01/2017    Jaw Surgery    OTHER SURGICAL HISTORY  07/22/2016    Brain Surgery     Allergies   Allergen Reactions    Pollen Extracts Unknown     Current Outpatient Medications   Medication Sig Dispense Refill    osimertinib (Tagrisso) 80 mg tablet 1 tabs, ORAL, DAILY, 30 tabs, Date: 4/24/24 11:11:00 AM EDT, Tablet      allopurinol (Zyloprim) 300 mg tablet Take 1 tablet (300 mg) by mouth once daily. 90 tablet 3    gabapentin (Neurontin) 100 mg capsule Take 1 capsule (100 mg) by mouth 2 times a day. 60 capsule 2    olmesartan (BENIcar) 40 mg tablet Take 1 tablet (40 mg) by mouth once daily. 90 tablet 3    PARoxetine (Paxil) 20 mg tablet Take 1 tablet (20 mg) by mouth once daily in the morning. Take before meals. 90 tablet 3     No current facility-administered medications for this visit.     Family History   Problem Relation Name Age of Onset    Stomach cancer Mother      Stomach cancer Sister      Brain cancer Son       Social History     Socioeconomic History    Marital status:    Tobacco Use    Smoking status: Former     Types: Cigarettes    Smokeless tobacco: Never  "  Substance and Sexual Activity    Alcohol use: Yes     Comment: occasional    Drug use: Never     Immunization History   Administered Date(s) Administered    Flu vaccine (IIV4), preservative free *Check age/dose* 01/01/2018, 12/01/2019, 09/01/2022    Flu vaccine, trivalent, preservative free, age 6 months and greater (Fluarix/Fluzone/Flulaval) 09/02/2015    Hep A / Hep B 10/11/2024, 11/12/2024    Influenza, Unspecified 09/02/2015    Influenza, seasonal, injectable 09/02/2015, 09/01/2022    Moderna COVID-19 vaccine, 12 years and older (50mcg/0.5mL)(Spikevax) 10/27/2023, 09/06/2024    Moderna COVID-19 vaccine, bivalent, blue cap/gray label *Check age/dose* 09/29/2022    Moderna SARS-CoV-2 Vaccination 03/10/2021, 04/07/2021, 10/27/2021, 04/22/2022    PPD Test 11/22/2011    Pneumococcal Conjugate PCV 7 10/09/2018    Pneumococcal conjugate vaccine, 13-valent (PREVNAR 13) 10/09/2018    Pneumococcal polysaccharide vaccine, 23-valent, age 2 years and older (PNEUMOVAX 23) 02/06/2020    RSV, 60 Years And Older (AREXVY) 01/12/2024    Tdap vaccine, age 7 year and older (BOOSTRIX, ADACEL) 10/09/2018    Zoster vaccine, recombinant, adult (SHINGRIX) 05/08/2019, 02/06/2020    Zoster, Unspecified 10/19/2022, 12/21/2022       Review of Systems  Review of systems is otherwise negative unless stated above or in history of present illness.    Objective   Visit Vitals  /73   Pulse 84   Temp 36.2 °C (97.1 °F)   Ht 1.676 m (5' 6\")   Wt 64.4 kg (142 lb)   SpO2 96%   BMI 22.92 kg/m²   Smoking Status Former   BSA 1.73 m²     Physical Exam  Constitutional:       General: not in acute distress.   HENT:      Head: Normocephalic and atraumatic.      Nose: Nose normal.   Eyes:      Extraocular Movements: Extraocular movements intact.      Conjunctiva/sclera: Conjunctivae normal.   Cardiovascular:      Rate and Rhythm: Normal rate ,  No M/R/G  Pulmonary: Expiratory rhonchi with crackles     Effort: Pulmonary effort is normal.      Breath " sounds: Normal, Bilat Equal AE  Skin:     General: Skin is warm.   Neurological:      Mental Status: He is alert and oriented to person, place, and time.   Psychiatric:   Anxiety depression without suicide   Mood and Affect: Mood normal.         Behavior: Behavior normal.   Musculoskeletal osteoarthritic gouty arthritis remission  FROM in all extremitirs,  Joint-no swelling or tenderness    Office Visit on 02/06/2025   Component Date Value Ref Range Status    SED RATE BY MODIFIED WESTERGREN 02/06/2025 2  < OR = 20 mm/h Final    URIC ACID 02/06/2025 4.2  4.0 - 8.0 mg/dL Final   Lab on 12/03/2024   Component Date Value Ref Range Status    Hemoglobin A1C 12/03/2024 5.5  See comment % Final    Estimated Average Glucose 12/03/2024 111  Not Established mg/dL Final    Albumin, Urine Random 12/03/2024 41.4  Not established mg/L Final    Creatinine, Urine Random 12/03/2024 138.9  20.0 - 370.0 mg/dL Final    Albumin/Creatinine Ratio 12/03/2024 29.8  <30.0 ug/mg Creat Final    Glucose 12/03/2024 107 (H)  74 - 99 mg/dL Final    Sodium 12/03/2024 144  136 - 145 mmol/L Final    Potassium 12/03/2024 4.3  3.5 - 5.3 mmol/L Final    Chloride 12/03/2024 110 (H)  98 - 107 mmol/L Final    Bicarbonate 12/03/2024 25  21 - 32 mmol/L Final    Anion Gap 12/03/2024 13  10 - 20 mmol/L Final    Urea Nitrogen 12/03/2024 16  6 - 23 mg/dL Final    Creatinine 12/03/2024 1.07  0.50 - 1.30 mg/dL Final    eGFR 12/03/2024 74  >60 mL/min/1.73m*2 Final    Calcium 12/03/2024 9.2  8.6 - 10.6 mg/dL Final    Albumin 12/03/2024 4.3  3.4 - 5.0 g/dL Final    Alkaline Phosphatase 12/03/2024 74  33 - 136 U/L Final    Total Protein 12/03/2024 6.8  6.4 - 8.2 g/dL Final    AST 12/03/2024 22  9 - 39 U/L Final    Bilirubin, Total 12/03/2024 0.6  0.0 - 1.2 mg/dL Final    ALT 12/03/2024 27  10 - 52 U/L Final    Uric Acid 12/03/2024 4.2  4.0 - 7.5 mg/dL Final       Radiology: Reviewed imaging in powerchart.  XR chest 2 views    Result Date: 3/13/2025  PROCEDURE:  XR  CHEST 2 VIEWS HISTORY:  f/u on pneumothorax;OTHER REASON COMPARISON: Chest x-ray, 3/28/2024. Chest CT, 2/7/2025. FINDINGS: The lungs are clear bilaterally. There is a small left apical pneumothorax measuring approximately 1.7 cm (approximately 5%). There is no focal infiltrate, pleural effusion or right-sided pneumothorax. The cardiomediastinal contours are stable with atherosclerotic aortic calcification. There are no acute bony or soft tissue abnormalities.  There is no blunting of the costophrenic angles on the lateral view.  IMPRESSION: Persistent small left apical pneumothorax as seen on the recent chest CT. Continued radiographic follow-up recommended. Electronically signed by:  Lamont Michel MD  03/14/2025 02:31 PM EDT  Workstation: [id number]        Charting was completed using voice recognition technology and may include unintended errors.

## 2025-05-06 ENCOUNTER — APPOINTMENT (OUTPATIENT)
Dept: PRIMARY CARE | Facility: CLINIC | Age: 70
End: 2025-05-06
Payer: MEDICARE

## 2025-05-21 ENCOUNTER — TELEPHONE (OUTPATIENT)
Dept: PRIMARY CARE | Facility: CLINIC | Age: 70
End: 2025-05-21
Payer: MEDICARE

## 2025-05-22 ENCOUNTER — PATIENT OUTREACH (OUTPATIENT)
Dept: PRIMARY CARE | Facility: CLINIC | Age: 70
End: 2025-05-22
Payer: MEDICARE

## 2025-05-22 NOTE — PROGRESS NOTES
Discharge Facility: [facility]  Discharge Diagnosis: Left-sided spontaneous pneumothorax   Admission Date: 5/14/25  Discharge Date: 5/20/25    PCP Appointment Date: 5/27/25  Specialist Appointment Date: needs oncology  Hospital Encounter and Summary Linked: Yes  Unknown  Two attempts were made to reach patient within two business days after discharge. Left voicemail with contact information for patient to call back with any non-emergent questions or concerns.

## 2025-05-27 ENCOUNTER — TELEPHONE (OUTPATIENT)
Dept: PRIMARY CARE | Facility: CLINIC | Age: 70
End: 2025-05-27

## 2025-05-27 ENCOUNTER — APPOINTMENT (OUTPATIENT)
Dept: PRIMARY CARE | Facility: CLINIC | Age: 70
End: 2025-05-27
Payer: MEDICARE

## 2025-05-27 VITALS
TEMPERATURE: 97.2 F | BODY MASS INDEX: 22.02 KG/M2 | HEART RATE: 71 BPM | DIASTOLIC BLOOD PRESSURE: 63 MMHG | OXYGEN SATURATION: 97 % | SYSTOLIC BLOOD PRESSURE: 125 MMHG | HEIGHT: 66 IN | WEIGHT: 137 LBS

## 2025-05-27 DIAGNOSIS — R05.9 COUGH, UNSPECIFIED TYPE: ICD-10-CM

## 2025-05-27 DIAGNOSIS — Z09 HOSPITAL DISCHARGE FOLLOW-UP: Primary | ICD-10-CM

## 2025-05-27 DIAGNOSIS — Z86.011 HISTORY OF MENINGIOMA OF THE BRAIN: ICD-10-CM

## 2025-05-27 DIAGNOSIS — J93.9 PNEUMOTHORAX ON LEFT: ICD-10-CM

## 2025-05-27 DIAGNOSIS — C34.92 PRIMARY MALIGNANT NEOPLASM OF LEFT LUNG METASTATIC TO OTHER SITE (MULTI): ICD-10-CM

## 2025-05-27 DIAGNOSIS — J43.1 PANLOBULAR EMPHYSEMA (MULTI): ICD-10-CM

## 2025-05-27 DIAGNOSIS — F41.9 ANXIETY AND DEPRESSION: ICD-10-CM

## 2025-05-27 DIAGNOSIS — F32.A ANXIETY AND DEPRESSION: ICD-10-CM

## 2025-05-27 DIAGNOSIS — Z13.220 NEED FOR LIPID SCREENING: ICD-10-CM

## 2025-05-27 LAB
NON-UH HIE A/G RATIO: 1.1
NON-UH HIE ALB: 3.4 G/DL (ref 3.4–5)
NON-UH HIE ALK PHOS: 71 UNIT/L (ref 45–117)
NON-UH HIE BASO COUNT: 0.07 X1000 (ref 0–0.2)
NON-UH HIE BASOS %: 0.8 %
NON-UH HIE BILIRUBIN, TOTAL: 0.5 MG/DL (ref 0.3–1.2)
NON-UH HIE BUN/CREAT RATIO: 22
NON-UH HIE BUN: 22 MG/DL (ref 9–23)
NON-UH HIE CALCIUM: 9.3 MG/DL (ref 8.7–10.4)
NON-UH HIE CALCULATED LDL CHOLESTEROL: 54 MG/DL (ref 60–130)
NON-UH HIE CALCULATED OSMOLALITY: 284 MOSM/KG (ref 275–295)
NON-UH HIE CHLORIDE: 105 MMOL/L (ref 98–107)
NON-UH HIE CHOLESTEROL: 148 MG/DL (ref 100–200)
NON-UH HIE CO2, VENOUS: 28 MMOL/L (ref 20–31)
NON-UH HIE CREATININE: 1 MG/DL (ref 0.6–1.1)
NON-UH HIE DIFF?: NORMAL
NON-UH HIE EOS COUNT: 0.27 X1000 (ref 0–0.5)
NON-UH HIE EOSIN %: 3.4 %
NON-UH HIE GFR AA: >60
NON-UH HIE GLOBULIN: 3.1 G/DL
NON-UH HIE GLOMERULAR FILTRATION RATE: >60 ML/MIN/1.73M?
NON-UH HIE GLUCOSE: 90 MG/DL (ref 74–106)
NON-UH HIE GOT: 24 UNIT/L (ref 15–37)
NON-UH HIE GPT: 60 UNIT/L (ref 10–49)
NON-UH HIE HCT: 47 % (ref 41–52)
NON-UH HIE HDL CHOLESTEROL: 44 MG/DL (ref 40–60)
NON-UH HIE HGB: 15.5 G/DL (ref 13.5–17.5)
NON-UH HIE INSTR WBC: 8.1
NON-UH HIE K: 3.9 MMOL/L (ref 3.5–5.1)
NON-UH HIE LYMPH %: 20.4 %
NON-UH HIE LYMPH COUNT: 1.65 X1000 (ref 1.2–4.8)
NON-UH HIE MCH: 29.9 PG (ref 27–34)
NON-UH HIE MCHC: 33 G/DL (ref 32–37)
NON-UH HIE MCV: 90.7 FL (ref 80–100)
NON-UH HIE MONO %: 9.1 %
NON-UH HIE MONO COUNT: 0.74 X1000 (ref 0.1–1)
NON-UH HIE MPV: 7.8 FL (ref 7.4–10.4)
NON-UH HIE NA: 141 MMOL/L (ref 135–145)
NON-UH HIE NEUTROPHIL %: 66.2 %
NON-UH HIE NEUTROPHIL COUNT (ANC): 5.35 X1000 (ref 1.4–8.8)
NON-UH HIE NUCLEATED RBC: 0 /100WBC
NON-UH HIE PLATELET: 241 X10 (ref 150–450)
NON-UH HIE RBC: 5.18 X10 (ref 4.7–6.1)
NON-UH HIE RDW: 14 % (ref 11.5–14.5)
NON-UH HIE TOTAL CHOL/HDL CHOL RATIO: 3.4
NON-UH HIE TOTAL PROTEIN: 6.5 G/DL (ref 5.7–8.2)
NON-UH HIE TRIGLYCERIDES: 249 MG/DL (ref 30–150)
NON-UH HIE WBC: 8.1 X10 (ref 4.5–11)

## 2025-05-27 PROCEDURE — 1160F RVW MEDS BY RX/DR IN RCRD: CPT | Performed by: INTERNAL MEDICINE

## 2025-05-27 PROCEDURE — 3078F DIAST BP <80 MM HG: CPT | Performed by: INTERNAL MEDICINE

## 2025-05-27 PROCEDURE — 3074F SYST BP LT 130 MM HG: CPT | Performed by: INTERNAL MEDICINE

## 2025-05-27 PROCEDURE — 3008F BODY MASS INDEX DOCD: CPT | Performed by: INTERNAL MEDICINE

## 2025-05-27 PROCEDURE — 1159F MED LIST DOCD IN RCRD: CPT | Performed by: INTERNAL MEDICINE

## 2025-05-27 PROCEDURE — 99496 TRANSJ CARE MGMT HIGH F2F 7D: CPT | Performed by: INTERNAL MEDICINE

## 2025-05-27 PROCEDURE — 1036F TOBACCO NON-USER: CPT | Performed by: INTERNAL MEDICINE

## 2025-05-27 RX ORDER — PANTOPRAZOLE SODIUM 20 MG/1
20 TABLET, DELAYED RELEASE ORAL
COMMUNITY
Start: 2025-05-20

## 2025-05-27 NOTE — TELEPHONE ENCOUNTER
----- Message from Lin Barbosa sent at 5/27/2025 12:52 PM EDT -----  Triglyceride 249 GPT 60 advise low-fat diet repeat LFT 4 weeks  ----- Message -----  From: Jesu Gentile - Lab Results In  Sent: 5/27/2025  11:57 AM EDT  To: Lin Barbosa MD

## 2025-05-27 NOTE — ASSESSMENT & PLAN NOTE
Pneumothorax lung cancer pleural effusion a new lung nodule associated with the infection advised follow-up with the oncology pulmonary service twice a year

## 2025-05-27 NOTE — PROGRESS NOTES
Subjective   Patient ID: Fortunato Romano is a 69 y.o. male who presents for Hospital Follow-up.    Assessment/Plan     Problem List Items Addressed This Visit       Anxiety and depression    Not suicidal PHQ 6 continue Paxil         Hospital discharge follow-up - Primary    Patient hospitalized since last visit medication list reviewed and  reconciled with discharge medications face to face visit with patient discuss discharge medication and outpatient medication ceconciliations and answers all questions to patient's and caregiver review hospital record pending diagnostic test and treatment orders to improved coordinate care across the medical community and  referal with provider/service to support patient's health and health related problems to increase patient's satisfaction by reducing risk of readmission I improving And meeting patient's needs advise bring all prescription and nonprescription medication with you.           Lung cancer, primary, with metastasis from lung to other site (Multi)    Relevant Orders    CBC and Auto Differential    Comprehensive Metabolic Panel    Panlobular emphysema (Multi)    Pneumothorax lung cancer pleural effusion a new lung nodule associated with the infection advised follow-up with the oncology pulmonary service twice a year         Relevant Orders    CBC and Auto Differential    Comprehensive Metabolic Panel    Pneumothorax on left    Relevant Orders    CBC and Auto Differential    Comprehensive Metabolic Panel    Need for lipid screening    Relevant Orders    Lipid panel    Cough    Relevant Orders    CBC and Auto Differential    Comprehensive Metabolic Panel    XR chest 2 views    History of meningioma of the brain       HPI 69-year-old patient who had a lung cancer survival hide multiple recurrent pneumothorax admitted in the hospital May 14 discharged on May 20 admitting diagnosis recurrent left pneumothorax status post chest tube placement 7 oxygens    Evaluated by the PCP  myself infectious disease oncologist and pulmonary service    Chest chest wall close oxygen was given steroid antibiotic was given chest tube was close discharge home Home with home oxygen's antibiotic and steroid    Review hospital record discussed personally with the pulmonary service    Osteoarthritis gouty arthritis allopurinol 300 mg at night monitor uric acid twice a year    COPD continue Trelegy discussed personally with the pulmonary service    Osteoarthritis gouty arthritis allopurinol 300 mg at night monitor uric acid twice a year    COPD continue Trelegy 1 puff a day and nebulized aerosol every 8 hour oxygen 2 L 24/7    Advised to finish Medrol Dosepak and check with the pulmonary service    Hypertension Benicar 40 mg monitor BMP twice a year    History of lung cancer continue taking the Tagrisso 80 mg a tablet follow-up with oncology    Gastritis Protonix 20 mg a day monitor H. pylori magnesium once a year    Anxiety depression no suicidal PHQ 6 continue Paxil 20 mg a day monitor PHQ    Case discussed with the oncology service pulmonary service    Chest tube placement on May 16, 2025    Bronchoscopy March 28, 2024    Suboccipital craniotomy with removal of the cerebral meningioma benign tumor of the brain on November 3, 2014.  Medical History[1]  Surgical History[2]  Allergies[3]  Current Medications[4]  Family History[5]  Social History[6]  Immunization History   Administered Date(s) Administered    Flu vaccine (IIV4), preservative free *Check age/dose* 01/01/2018, 12/01/2019, 09/01/2022    Flu vaccine, trivalent, preservative free, age 6 months and greater (Fluarix/Fluzone/Flulaval) 09/02/2015    Hep A / Hep B 10/11/2024, 11/12/2024    Influenza, Unspecified 09/02/2015    Influenza, seasonal, injectable 09/02/2015, 09/01/2022    Moderna COVID-19 vaccine, 12 years and older (50mcg/0.5mL)(Spikevax) 10/27/2023, 09/06/2024    Moderna COVID-19 vaccine, bivalent, blue cap/gray label *Check age/dose*  "09/29/2022    Moderna SARS-CoV-2 Vaccination 03/10/2021, 04/07/2021, 10/27/2021, 04/22/2022    PPD Test 11/22/2011    Pneumococcal Conjugate PCV 7 10/09/2018    Pneumococcal conjugate vaccine, 13-valent (PREVNAR 13) 10/09/2018    Pneumococcal polysaccharide vaccine, 23-valent, age 2 years and older (PNEUMOVAX 23) 02/06/2020    RSV, 60 Years And Older (AREXVY) 01/12/2024    Tdap vaccine, age 7 year and older (BOOSTRIX, ADACEL) 10/09/2018    Zoster vaccine, recombinant, adult (SHINGRIX) 05/08/2019, 02/06/2020    Zoster, Unspecified 10/19/2022, 12/21/2022       Review of Systems  Review of systems is otherwise negative unless stated above or in history of present illness.    Objective   Visit Vitals  /63 (BP Location: Left arm, Patient Position: Sitting)   Pulse 71   Temp 36.2 °C (97.2 °F)   Ht 1.676 m (5' 6\")   Wt 62.1 kg (137 lb)   SpO2 97%   BMI 22.11 kg/m²   Smoking Status Former   BSA 1.7 m²     Physical Exam  Constitutional: Cachexia of chronic disease     General: not in acute distress.   HENT:      Head: Normocephalic and atraumatic.      Nose: Nose normal.   Eyes:      Extrao no jaundice cular Movements: Extraocular movements intact.      Conjunctiva/sclera: Conjunctivae normal.   Cardiovascular: S4     Rate and Rhythm: Normal rate ,  No M/R/G  Pulmonary: Diminished air entry in the left mid and lower lobe     Effort: Pulmonary effort is normal.      Breath sounds: Normal, Bilat Equal AE  Skin: Dry skin     General: Skin is warm.   Neurological: Neurology cephalgia     Mental Status: He is alert and oriented to person, place, and time.   Psychiatric:         Mood and Affect: Mood normal.         Behavior: Behavior normal.   Musculoskeletal osteo gouty arthritis remains in  FROM in all extremitirs,  Joint-no swelling or tenderness    Orders Only on 05/27/2025   Component Date Value Ref Range Status    [facility] Instr WBC 05/27/2025 8.1   Final    [facility] MCHC 05/27/2025 33.0  32.0 - 37.0 g/dL Final    " [facility] MCV 05/27/2025 90.7  80.0 - 100.0 fL Final    [facility] MPV 05/27/2025 7.8  7.4 - 10.4 fL Final    [facility] HGB 05/27/2025 15.5  13.5 - 17.5 g/dL Final    [facility] RDW 05/27/2025 14.0  11.5 - 14.5 % Final    [facility] WBC 05/27/2025 8.1  4.5 - 11.0 x10 Final    [facility] MCH 05/27/2025 29.9  27.0 - 34.0 pg Final    [facility] Nucleated RBC 05/27/2025 0  /100WBC Final    [facility] HCT 05/27/2025 47.0  41.0 - 52.0 % Final    [facility] Platelet 05/27/2025 241  150 - 450 x10 Final    [facility] RBC 05/27/2025 5.18  4.70 - 6.10 x10 Final    [facility] DIFF? 05/27/2025 No^NO   Final    [facility] Eos Count 05/27/2025 0.27  0.00 - 0.50 x1000 Final    [facility] Lymph Count 05/27/2025 1.65  1.20 - 4.80 x1000 Final    [facility] Lymph % 05/27/2025 20.4  % Final    [facility] Baso Count 05/27/2025 0.07  0.00 - 0.20 x1000 Final    [facility] Basos % 05/27/2025 0.8  % Final    [facility] Mono % 05/27/2025 9.1  % Final    [facility] Mono Count 05/27/2025 0.74  0.10 - 1.00 x1000 Final    [facility] Neutrophil % 05/27/2025 66.2  % Final    [facility] Neutrophil Count (ANC) 05/27/2025 5.35  1.40 - 8.80 x1000 Final    [facility] Eosin % 05/27/2025 3.4  % Final    [facility] Na 05/27/2025 141  135 - 145 mmol/L Final    [facility] Calcium 05/27/2025 9.3  8.7 - 10.4 mg/dL Final    [facility] Glomerular Filtration R* 05/27/2025 >60  mL/min/1.73m? Final    [facility] Calculated Osmolality 05/27/2025 284  275 - 295 mOsm/kg Final    [facility] Alk Phos 05/27/2025 71  45 - 117 unit/L Final    [facility] Creatinine 05/27/2025 1.0  0.6 - 1.1 mg/dL Final    [facility] Globulin 05/27/2025 3.1  g/dL Final    [facility] Chloride 05/27/2025 105  98 - 107 mmol/L Final    [facility] GOT 05/27/2025 24  15 - 37 unit/L Final    [facility] K 05/27/2025 3.9  3.5 - 5.1 mmol/L Final    [facility] BUN/Creat Ratio 05/27/2025 22.0   Final    [facility] GFR AA 05/27/2025 >60   Final    [facility] Bilirubin, Total 05/27/2025 0.50  0.30 -  1.20 mg/dL Final    [facility] BUN 05/27/2025 22  9 - 23 mg/dL Final    [facility] A/G Ratio 05/27/2025 1.1   Final    [facility] ALB 05/27/2025 3.4  3.4 - 5.0 g/dL Final    [facility] CO2, venous 05/27/2025 28.0  20.0 - 31.0 mmol/L Final    [facility] Glucose 05/27/2025 90  74 - 106 mg/dL Final    [facility] GPT 05/27/2025 60 (H)  10 - 49 unit/L Final    [facility] Total Protein 05/27/2025 6.5  5.7 - 8.2 g/dL Final    [facility] Calculated LDL Choleste* 05/27/2025 54 (L)  60 - 130 mg/dL Final    [facility] Total Chol/HDL Chol Rat* 05/27/2025 3.4   Final    [facility] Cholesterol 05/27/2025 148  100 - 200 mg/dL Final    [facility] HDL Cholesterol 05/27/2025 44  40 - 60 mg/dL Final    [facility] Triglycerides 05/27/2025 249 (H)  30 - 150 mg/dL Final   Office Visit on 02/06/2025   Component Date Value Ref Range Status    SED RATE BY MODIFIED WESTERGREN 02/06/2025 2  < OR = 20 mm/h Final    URIC ACID 02/06/2025 4.2  4.0 - 8.0 mg/dL Final       Radiology: Reviewed imaging in powerchart.  Imaging  No results found.    Cardiology, Vascular, and Other Imaging  No other imaging results found for the past 7 days        Charting was completed using voice recognition technology and may include unintended errors.            [1]   Past Medical History:  Diagnosis Date    Benign prostatic hyperplasia without lower urinary tract symptoms 05/25/2021    Enlarged prostate without lower urinary tract symptoms (luts)    Encounter for screening for malignant neoplasm of respiratory organs 05/02/2022    Encounter for screening for lung cancer    Essential (primary) hypertension 05/13/2021    Benign essential hypertension    Generalized anxiety disorder 05/25/2021    VIVEK (generalized anxiety disorder)    Mixed hyperlipidemia 05/13/2021    Hyperlipidemia, mixed    Other abnormal glucose     Elevated glucose    Other conditions influencing health status     Postoperative seroma    Other conditions influencing health status 02/05/2018     History of cough    Other specified soft tissue disorders 12/14/2017    Soft tissue mass    Pain in left knee 05/30/2017    Left knee pain    Personal history of benign neoplasm of the brain 05/13/2021    History of benign neoplasm of brain    Personal history of neoplasm of uncertain behavior 05/07/2018    History of neoplasm of uncertain behavior    Personal history of other diseases of the circulatory system 05/08/2019    History of hypertension    Personal history of other diseases of the digestive system 05/02/2022    History of fatty infiltration of liver    Personal history of other diseases of the musculoskeletal system and connective tissue 07/05/2022    History of polymyalgia rheumatica    Personal history of other diseases of the musculoskeletal system and connective tissue 11/04/2021    History of gout    Personal history of other diseases of the respiratory system 05/02/2022    History of sinusitis    Personal history of other diseases of the respiratory system 02/05/2018    History of upper respiratory infection    Personal history of other endocrine, nutritional and metabolic disease 11/04/2021    History of diabetes mellitus    Personal history of other endocrine, nutritional and metabolic disease 05/08/2019    History of hyperlipidemia    Personal history of other endocrine, nutritional and metabolic disease 05/25/2021    History of vitamin D deficiency    Personal history of other specified conditions 02/13/2018    History of ataxia    Personal history of other specified conditions 07/22/2016    History of urinary frequency    Personal history of other specified conditions 07/22/2016    History of fever    Personal history of other specified conditions 11/20/2017    History of bradycardia    Vitamin D deficiency, unspecified 09/08/2015    Vitamin D deficiency   [2]   Past Surgical History:  Procedure Laterality Date    MANDIBLE SURGERY  11/01/2017    Jaw Surgery    OTHER SURGICAL HISTORY   07/22/2016    Brain Surgery   [3]   Allergies  Allergen Reactions    Pollen Extracts Unknown   [4]   Current Outpatient Medications   Medication Sig Dispense Refill    allopurinol (Zyloprim) 300 mg tablet Take 1 tablet (300 mg) by mouth once daily. 90 tablet 3    fluticasone-umeclidin-vilanter (Trelegy Ellipta) 100-62.5-25 mcg blister with device Inhale 1 puff once daily. 60 each 3    gabapentin (Neurontin) 100 mg capsule Take 1 capsule (100 mg) by mouth 2 times a day. 60 capsule 2    olmesartan (BENIcar) 40 mg tablet Take 1 tablet (40 mg) by mouth once daily. 90 tablet 3    osimertinib (Tagrisso) 80 mg tablet 1 tabs, ORAL, DAILY, 30 tabs, Date: 4/24/24 11:11:00 AM EDT, Tablet      PARoxetine (Paxil) 20 mg tablet Take 1 tablet (20 mg) by mouth once daily in the morning. Take before meals. 90 tablet 3    Protonix 20 mg EC tablet 1 tablet (20 mg).       No current facility-administered medications for this visit.   [5]   Family History  Problem Relation Name Age of Onset    Stomach cancer Mother      Stomach cancer Sister      Brain cancer Son     [6]   Social History  Socioeconomic History    Marital status:    Tobacco Use    Smoking status: Former     Types: Cigarettes    Smokeless tobacco: Never   Substance and Sexual Activity    Alcohol use: Yes     Comment: occasional    Drug use: Never

## 2025-05-31 NOTE — PROGRESS NOTES
I did call patient twice to go to emergency department due to an expanding pneumothorax. I left patient a message as patient did not

## 2025-06-03 ENCOUNTER — APPOINTMENT (OUTPATIENT)
Dept: RADIOLOGY | Facility: HOSPITAL | Age: 70
End: 2025-06-03
Payer: MEDICARE

## 2025-06-03 ENCOUNTER — DOCUMENTATION (OUTPATIENT)
Dept: SURGERY | Facility: HOSPITAL | Age: 70
End: 2025-06-03

## 2025-06-03 ENCOUNTER — HOSPITAL ENCOUNTER (INPATIENT)
Facility: HOSPITAL | Age: 70
LOS: 1 days | Discharge: HOME | End: 2025-06-04
Attending: THORACIC SURGERY (CARDIOTHORACIC VASCULAR SURGERY) | Admitting: NURSE PRACTITIONER
Payer: MEDICARE

## 2025-06-03 DIAGNOSIS — J93.9 PNEUMOTHORAX ON LEFT: ICD-10-CM

## 2025-06-03 DIAGNOSIS — J93.9 PNEUMOTHORAX: Primary | ICD-10-CM

## 2025-06-03 PROCEDURE — 2500000001 HC RX 250 WO HCPCS SELF ADMINISTERED DRUGS (ALT 637 FOR MEDICARE OP): Performed by: NURSE PRACTITIONER

## 2025-06-03 PROCEDURE — 1200000002 HC GENERAL ROOM WITH TELEMETRY DAILY

## 2025-06-03 PROCEDURE — 2500000004 HC RX 250 GENERAL PHARMACY W/ HCPCS (ALT 636 FOR OP/ED): Performed by: NURSE PRACTITIONER

## 2025-06-03 PROCEDURE — 71045 X-RAY EXAM CHEST 1 VIEW: CPT | Performed by: RADIOLOGY

## 2025-06-03 PROCEDURE — 71045 X-RAY EXAM CHEST 1 VIEW: CPT

## 2025-06-03 RX ORDER — ONDANSETRON HYDROCHLORIDE 2 MG/ML
4 INJECTION, SOLUTION INTRAVENOUS EVERY 8 HOURS PRN
Status: DISCONTINUED | OUTPATIENT
Start: 2025-06-03 | End: 2025-06-04 | Stop reason: HOSPADM

## 2025-06-03 RX ORDER — IPRATROPIUM BROMIDE AND ALBUTEROL SULFATE 2.5; .5 MG/3ML; MG/3ML
3 SOLUTION RESPIRATORY (INHALATION) EVERY 6 HOURS PRN
Status: DISCONTINUED | OUTPATIENT
Start: 2025-06-03 | End: 2025-06-04 | Stop reason: HOSPADM

## 2025-06-03 RX ORDER — NALOXONE HYDROCHLORIDE 0.4 MG/ML
0.2 INJECTION, SOLUTION INTRAMUSCULAR; INTRAVENOUS; SUBCUTANEOUS EVERY 5 MIN PRN
Status: DISCONTINUED | OUTPATIENT
Start: 2025-06-03 | End: 2025-06-04 | Stop reason: HOSPADM

## 2025-06-03 RX ORDER — GABAPENTIN 100 MG/1
100 CAPSULE ORAL 2 TIMES DAILY
Status: DISCONTINUED | OUTPATIENT
Start: 2025-06-03 | End: 2025-06-04 | Stop reason: HOSPADM

## 2025-06-03 RX ORDER — ONDANSETRON 4 MG/1
4 TABLET, FILM COATED ORAL EVERY 8 HOURS PRN
Status: DISCONTINUED | OUTPATIENT
Start: 2025-06-03 | End: 2025-06-04 | Stop reason: HOSPADM

## 2025-06-03 RX ORDER — ACETAMINOPHEN 325 MG/1
650 TABLET ORAL EVERY 4 HOURS PRN
Status: DISCONTINUED | OUTPATIENT
Start: 2025-06-03 | End: 2025-06-04 | Stop reason: HOSPADM

## 2025-06-03 RX ORDER — IPRATROPIUM BROMIDE AND ALBUTEROL SULFATE 2.5; .5 MG/3ML; MG/3ML
3 SOLUTION RESPIRATORY (INHALATION)
Status: DISCONTINUED | OUTPATIENT
Start: 2025-06-03 | End: 2025-06-03

## 2025-06-03 RX ORDER — DOCUSATE SODIUM 100 MG/1
100 CAPSULE, LIQUID FILLED ORAL 2 TIMES DAILY
Status: DISCONTINUED | OUTPATIENT
Start: 2025-06-03 | End: 2025-06-04 | Stop reason: HOSPADM

## 2025-06-03 RX ORDER — PAROXETINE 20 MG/1
20 TABLET, FILM COATED ORAL
Status: DISCONTINUED | OUTPATIENT
Start: 2025-06-04 | End: 2025-06-04 | Stop reason: HOSPADM

## 2025-06-03 RX ORDER — PANTOPRAZOLE SODIUM 20 MG/1
20 TABLET, DELAYED RELEASE ORAL
Status: DISCONTINUED | OUTPATIENT
Start: 2025-06-04 | End: 2025-06-04 | Stop reason: HOSPADM

## 2025-06-03 RX ORDER — FLUTICASONE FUROATE AND VILANTEROL 100; 25 UG/1; UG/1
1 POWDER RESPIRATORY (INHALATION)
Status: DISCONTINUED | OUTPATIENT
Start: 2025-06-03 | End: 2025-06-04 | Stop reason: HOSPADM

## 2025-06-03 RX ORDER — ACETAMINOPHEN 500 MG
5 TABLET ORAL NIGHTLY PRN
Status: DISCONTINUED | OUTPATIENT
Start: 2025-06-03 | End: 2025-06-04 | Stop reason: HOSPADM

## 2025-06-03 RX ORDER — VALSARTAN 320 MG/1
320 TABLET ORAL DAILY
Status: DISCONTINUED | OUTPATIENT
Start: 2025-06-03 | End: 2025-06-04 | Stop reason: HOSPADM

## 2025-06-03 RX ORDER — HEPARIN SODIUM 5000 [USP'U]/ML
5000 INJECTION, SOLUTION INTRAVENOUS; SUBCUTANEOUS EVERY 8 HOURS
Status: DISCONTINUED | OUTPATIENT
Start: 2025-06-03 | End: 2025-06-04 | Stop reason: HOSPADM

## 2025-06-03 RX ORDER — ALLOPURINOL 300 MG/1
300 TABLET ORAL DAILY
Status: DISCONTINUED | OUTPATIENT
Start: 2025-06-03 | End: 2025-06-04 | Stop reason: HOSPADM

## 2025-06-03 RX ADMIN — VALSARTAN 320 MG: 320 TABLET, FILM COATED ORAL at 18:02

## 2025-06-03 RX ADMIN — ALLOPURINOL 300 MG: 300 TABLET ORAL at 18:02

## 2025-06-03 RX ADMIN — HEPARIN SODIUM 5000 UNITS: 5000 INJECTION, SOLUTION INTRAVENOUS; SUBCUTANEOUS at 18:02

## 2025-06-03 RX ADMIN — OSIMERTINIB 80 MG: 80 TABLET, FILM COATED ORAL at 18:02

## 2025-06-03 RX ADMIN — MELATONIN TAB 5 MG 5 MG: 5 TAB at 23:37

## 2025-06-03 RX ADMIN — GABAPENTIN 100 MG: 100 CAPSULE ORAL at 20:11

## 2025-06-03 SDOH — ECONOMIC STABILITY: FOOD INSECURITY: WITHIN THE PAST 12 MONTHS, THE FOOD YOU BOUGHT JUST DIDN'T LAST AND YOU DIDN'T HAVE MONEY TO GET MORE.: NEVER TRUE

## 2025-06-03 SDOH — SOCIAL STABILITY: SOCIAL INSECURITY
WITHIN THE LAST YEAR, HAVE YOU BEEN RAPED OR FORCED TO HAVE ANY KIND OF SEXUAL ACTIVITY BY YOUR PARTNER OR EX-PARTNER?: NO

## 2025-06-03 SDOH — SOCIAL STABILITY: SOCIAL INSECURITY: ARE YOU OR HAVE YOU BEEN THREATENED OR ABUSED PHYSICALLY, EMOTIONALLY, OR SEXUALLY BY ANYONE?: NO

## 2025-06-03 SDOH — ECONOMIC STABILITY: FOOD INSECURITY: WITHIN THE PAST 12 MONTHS, YOU WORRIED THAT YOUR FOOD WOULD RUN OUT BEFORE YOU GOT THE MONEY TO BUY MORE.: NEVER TRUE

## 2025-06-03 SDOH — ECONOMIC STABILITY: INCOME INSECURITY: IN THE PAST 12 MONTHS HAS THE ELECTRIC, GAS, OIL, OR WATER COMPANY THREATENED TO SHUT OFF SERVICES IN YOUR HOME?: NO

## 2025-06-03 SDOH — SOCIAL STABILITY: SOCIAL INSECURITY: WITHIN THE LAST YEAR, HAVE YOU BEEN AFRAID OF YOUR PARTNER OR EX-PARTNER?: NO

## 2025-06-03 SDOH — SOCIAL STABILITY: SOCIAL INSECURITY: ABUSE: ADULT

## 2025-06-03 SDOH — SOCIAL STABILITY: SOCIAL INSECURITY: WERE YOU ABLE TO COMPLETE ALL THE BEHAVIORAL HEALTH SCREENINGS?: YES

## 2025-06-03 SDOH — SOCIAL STABILITY: SOCIAL INSECURITY: HAS ANYONE EVER THREATENED TO HURT YOUR FAMILY OR YOUR PETS?: NO

## 2025-06-03 SDOH — SOCIAL STABILITY: SOCIAL INSECURITY
WITHIN THE LAST YEAR, HAVE YOU BEEN KICKED, HIT, SLAPPED, OR OTHERWISE PHYSICALLY HURT BY YOUR PARTNER OR EX-PARTNER?: NO

## 2025-06-03 SDOH — SOCIAL STABILITY: SOCIAL INSECURITY: ARE THERE ANY APPARENT SIGNS OF INJURIES/BEHAVIORS THAT COULD BE RELATED TO ABUSE/NEGLECT?: NO

## 2025-06-03 SDOH — SOCIAL STABILITY: SOCIAL INSECURITY: WITHIN THE LAST YEAR, HAVE YOU BEEN HUMILIATED OR EMOTIONALLY ABUSED IN OTHER WAYS BY YOUR PARTNER OR EX-PARTNER?: NO

## 2025-06-03 SDOH — SOCIAL STABILITY: SOCIAL INSECURITY: DO YOU FEEL ANYONE HAS EXPLOITED OR TAKEN ADVANTAGE OF YOU FINANCIALLY OR OF YOUR PERSONAL PROPERTY?: NO

## 2025-06-03 SDOH — SOCIAL STABILITY: SOCIAL INSECURITY: HAVE YOU HAD THOUGHTS OF HARMING ANYONE ELSE?: NO

## 2025-06-03 SDOH — SOCIAL STABILITY: SOCIAL INSECURITY: HAVE YOU HAD ANY THOUGHTS OF HARMING ANYONE ELSE?: NO

## 2025-06-03 SDOH — SOCIAL STABILITY: SOCIAL INSECURITY: DO YOU FEEL UNSAFE GOING BACK TO THE PLACE WHERE YOU ARE LIVING?: NO

## 2025-06-03 SDOH — SOCIAL STABILITY: SOCIAL INSECURITY: DOES ANYONE TRY TO KEEP YOU FROM HAVING/CONTACTING OTHER FRIENDS OR DOING THINGS OUTSIDE YOUR HOME?: NO

## 2025-06-03 ASSESSMENT — COGNITIVE AND FUNCTIONAL STATUS - GENERAL
WALKING IN HOSPITAL ROOM: A LITTLE
CLIMB 3 TO 5 STEPS WITH RAILING: A LITTLE
STANDING UP FROM CHAIR USING ARMS: A LITTLE
DAILY ACTIVITIY SCORE: 24
MOBILITY SCORE: 23
DAILY ACTIVITIY SCORE: 24
PATIENT BASELINE BEDBOUND: NO
MOBILITY SCORE: 21
CLIMB 3 TO 5 STEPS WITH RAILING: A LITTLE

## 2025-06-03 ASSESSMENT — PATIENT HEALTH QUESTIONNAIRE - PHQ9
2. FEELING DOWN, DEPRESSED OR HOPELESS: NOT AT ALL
1. LITTLE INTEREST OR PLEASURE IN DOING THINGS: NOT AT ALL
SUM OF ALL RESPONSES TO PHQ9 QUESTIONS 1 & 2: 0

## 2025-06-03 ASSESSMENT — ACTIVITIES OF DAILY LIVING (ADL)
DRESSING YOURSELF: INDEPENDENT
PATIENT'S MEMORY ADEQUATE TO SAFELY COMPLETE DAILY ACTIVITIES?: YES
BATHING: INDEPENDENT
WALKS IN HOME: INDEPENDENT
HEARING - LEFT EAR: FUNCTIONAL
HEARING - RIGHT EAR: FUNCTIONAL
LACK_OF_TRANSPORTATION: NO
LACK_OF_TRANSPORTATION: NO
TOILETING: INDEPENDENT
FEEDING YOURSELF: INDEPENDENT
GROOMING: INDEPENDENT
ADEQUATE_TO_COMPLETE_ADL: YES
JUDGMENT_ADEQUATE_SAFELY_COMPLETE_DAILY_ACTIVITIES: YES

## 2025-06-03 ASSESSMENT — COLUMBIA-SUICIDE SEVERITY RATING SCALE - C-SSRS
2. HAVE YOU ACTUALLY HAD ANY THOUGHTS OF KILLING YOURSELF?: NO
6. HAVE YOU EVER DONE ANYTHING, STARTED TO DO ANYTHING, OR PREPARED TO DO ANYTHING TO END YOUR LIFE?: NO
1. IN THE PAST MONTH, HAVE YOU WISHED YOU WERE DEAD OR WISHED YOU COULD GO TO SLEEP AND NOT WAKE UP?: NO

## 2025-06-03 ASSESSMENT — PAIN - FUNCTIONAL ASSESSMENT
PAIN_FUNCTIONAL_ASSESSMENT: 0-10
PAIN_FUNCTIONAL_ASSESSMENT: 0-10

## 2025-06-03 ASSESSMENT — LIFESTYLE VARIABLES
AUDIT-C TOTAL SCORE: 1
HOW OFTEN DO YOU HAVE 6 OR MORE DRINKS ON ONE OCCASION: NEVER
HOW MANY STANDARD DRINKS CONTAINING ALCOHOL DO YOU HAVE ON A TYPICAL DAY: 1 OR 2
SKIP TO QUESTIONS 9-10: 1
AUDIT-C TOTAL SCORE: 1
HOW OFTEN DO YOU HAVE A DRINK CONTAINING ALCOHOL: MONTHLY OR LESS

## 2025-06-03 ASSESSMENT — PAIN SCALES - GENERAL
PAINLEVEL_OUTOF10: 0 - NO PAIN
PAINLEVEL_OUTOF10: 0 - NO PAIN

## 2025-06-03 ASSESSMENT — ENCOUNTER SYMPTOMS: SHORTNESS OF BREATH: 1

## 2025-06-03 NOTE — H&P
History Of Present Illness  Fortunato Romano is a 69 y.o. male presenting from an OSH with a He comes to [facility] with an enlarging hydropneumothorax and chest tube. 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 He was reportedly diagnosed with his lung cancer in 2024 and the decision was to treat with oral medication (Tagrisso). He had a spontaneous pneumothorax in August of 2024 and was clinically stable. The pneumothorax has increased and he was admitted and a chest tube placed at that time. He then developed a persistent pneumo and CT placed once again. He comes to [facility] to consult with thoracic surgery.      Past Medical History  Medical History[1]    Surgical History  Surgical History[2]     Social History  He reports that he has quit smoking. His smoking use included cigarettes. He has never used smokeless tobacco. He reports current alcohol use. He reports that he does not use drugs.    Family History  Family History[3]     Allergies  Pollen extracts  Bee stings    Review of Systems   Respiratory:  Positive for shortness of breath.         Physical Exam  Constitutional:       Appearance: Normal appearance.   HENT:      Head: Normocephalic.      Right Ear: Tympanic membrane normal.      Left Ear: Tympanic membrane normal.      Nose: Nose normal.      Mouth/Throat:      Mouth: Mucous membranes are moist.   Eyes:      Extraocular Movements: Extraocular movements intact.      Conjunctiva/sclera: Conjunctivae normal.   Cardiovascular:      Rate and Rhythm: Normal rate and regular rhythm.      Pulses: Normal pulses.      Heart sounds: Normal heart sounds.   Pulmonary:      Effort: Pulmonary effort is normal.      Breath sounds: Examination of the left-upper field reveals decreased breath sounds. Examination of the left-lower field reveals decreased breath sounds. Decreased breath sounds present.   Abdominal:      General:  "Abdomen is flat. Bowel sounds are normal.      Palpations: Abdomen is soft.   Musculoskeletal:         General: Normal range of motion.      Cervical back: Normal range of motion.   Skin:     General: Skin is warm and dry.   Neurological:      General: No focal deficit present.      Mental Status: He is alert and oriented to person, place, and time.   Psychiatric:         Mood and Affect: Mood normal.         Behavior: Behavior normal.          Last Recorded Vitals  Blood pressure 133/85, pulse 79, temperature 36.3 °C (97.3 °F), temperature source Temporal, resp. rate 18, height 1.676 m (5' 6\"), weight 62.6 kg (138 lb), SpO2 92%.    Relevant Results  Scheduled medications  Scheduled Medications[4]  Continuous medications  Continuous Medications[5]  PRN medications  PRN Medications[6]    CXR from 5/31/25 from OSH: Left large pneumothorax; 40% hydropneumothorax on the left side present  CXR from admission 6/3/25: Left large pneumothorax  Assessment & Plan  Pneumothorax  Neurology:  -Begin oral regimen of pain control with Tylenol   -Continue home med Neurontin  -Bowel regimen available for constipation secondary to pain medication   -Out of bed to the chair throughout the day  -Encourage ambulation as tolerated    Cardiovascular: HTN  -Continue telemetry  -Vital signs every four hours  -Continue home regimen of Valsartan   -Replace electrolytes as needed for K >4, Mg >2    Pulmonology: Pneumothorax with chest tube  -Encourage incentive spirometer use every hour  -Continue pulmonary hygiene  -Continue Home Meds: Breo Ellipta and Spiriva  -DuoNebs prn  -Continue home med Tagrisso  -Clamp CT and await possible tomorrow; may consider CT scan  -Obtain daily CXR    Gastrointestinal:   - Regular diet as tolerated  - Zofran available for nausea  - Continue home med Protonix  - scheduled colace, PRN bowel regimen    Genitourinary:   -Continue to monitor daily electrolytes with routine BMPs   -Adequate urine " output    Infectious Disease:   -Trend CBC's    Hematology:   -Monitor for signs of acute blood loss  -Trend CBCs     Endocrine:     DVT Prophylaxis:   -Continue subcutaneous heparin and SCDs, early ambulation    Disposition:  -Plan for discharge to  -Assess for home needs     Patient seen and examined by this provider. Plan of care discussed with Dr. Jaciel Collins, CNP  Thoracic team pager [phone]           [1]   Past Medical History:  Diagnosis Date    Benign prostatic hyperplasia without lower urinary tract symptoms 05/25/2021    Enlarged prostate without lower urinary tract symptoms (luts)    Encounter for screening for malignant neoplasm of respiratory organs 05/02/2022    Encounter for screening for lung cancer    Essential (primary) hypertension 05/13/2021    Benign essential hypertension    Generalized anxiety disorder 05/25/2021    VIVEK (generalized anxiety disorder)    Mixed hyperlipidemia 05/13/2021    Hyperlipidemia, mixed    Other abnormal glucose     Elevated glucose    Other conditions influencing health status     Postoperative seroma    Other conditions influencing health status 02/05/2018    History of cough    Other specified soft tissue disorders 12/14/2017    Soft tissue mass    Pain in left knee 05/30/2017    Left knee pain    Personal history of benign neoplasm of the brain 05/13/2021    History of benign neoplasm of brain    Personal history of neoplasm of uncertain behavior 05/07/2018    History of neoplasm of uncertain behavior    Personal history of other diseases of the circulatory system 05/08/2019    History of hypertension    Personal history of other diseases of the digestive system 05/02/2022    History of fatty infiltration of liver    Personal history of other diseases of the musculoskeletal system and connective tissue 07/05/2022    History of polymyalgia rheumatica    Personal history of other diseases of the musculoskeletal system and connective tissue 11/04/2021     History of gout    Personal history of other diseases of the respiratory system 05/02/2022    History of sinusitis    Personal history of other diseases of the respiratory system 02/05/2018    History of upper respiratory infection    Personal history of other endocrine, nutritional and metabolic disease 11/04/2021    History of diabetes mellitus    Personal history of other endocrine, nutritional and metabolic disease 05/08/2019    History of hyperlipidemia    Personal history of other endocrine, nutritional and metabolic disease 05/25/2021    History of vitamin D deficiency    Personal history of other specified conditions 02/13/2018    History of ataxia    Personal history of other specified conditions 07/22/2016    History of urinary frequency    Personal history of other specified conditions 07/22/2016    History of fever    Personal history of other specified conditions 11/20/2017    History of bradycardia    Vitamin D deficiency, unspecified 09/08/2015    Vitamin D deficiency   [2]   Past Surgical History:  Procedure Laterality Date    MANDIBLE SURGERY  11/01/2017    Jaw Surgery    OTHER SURGICAL HISTORY  07/22/2016    Brain Surgery   [3]   Family History  Problem Relation Name Age of Onset    Stomach cancer Mother      Stomach cancer Sister      Brain cancer Son     [4] allopurinol, 300 mg, oral, Daily  docusate sodium, 100 mg, oral, BID  tiotropium, 2 puff, inhalation, Daily   And  fluticasone furoate-vilanteroL, 1 puff, inhalation, Daily  gabapentin, 100 mg, oral, BID  heparin (porcine), 5,000 Units, subcutaneous, q8h  [START ON 6/4/2025] pantoprazole, 20 mg, oral, Daily before breakfast  [START ON 6/4/2025] PARoxetine, 20 mg, oral, Daily before breakfast  osimertinib, 80 mg, oral, Daily  valsartan, 320 mg, oral, Daily  [5]    [6] PRN medications: acetaminophen, ipratropium-albuteroL, naloxone, ondansetron **OR** ondansetron, oxygen

## 2025-06-03 NOTE — ASSESSMENT & PLAN NOTE
Neurology:  -Begin oral regimen of pain control with Tylenol   -Continue home med Neurontin  -Bowel regimen available for constipation secondary to pain medication   -Out of bed to the chair throughout the day  -Encourage ambulation as tolerated    Cardiovascular: HTN  -Continue telemetry  -Vital signs every four hours  -Continue home regimen of Valsartan   -Replace electrolytes as needed for K >4, Mg >2    Pulmonology: Pneumothorax with chest tube  -Encourage incentive spirometer use every hour  -Continue pulmonary hygiene  -Continue Home Meds: Breo Ellipta and Spiriva  -DuoNebs prn  -Continue home med Tagrisso  -Clamp CT and await possible tomorrow; may consider CT scan  -Obtain daily CXR    Gastrointestinal:   - Regular diet as tolerated  - Zofran available for nausea  - Continue home med Protonix  - scheduled colace, PRN bowel regimen    Genitourinary:   -Continue to monitor daily electrolytes with routine BMPs   -Adequate urine output    Infectious Disease:   -Trend CBC's    Hematology:   -Monitor for signs of acute blood loss  -Trend CBCs     Endocrine:     DVT Prophylaxis:   -Continue subcutaneous heparin and SCDs, early ambulation    Disposition:  -Plan for discharge to  -Assess for home needs     Patient seen and examined by this provider. Plan of care discussed with Dr. Jaciel Collins, CNP  Thoracic team pager [phone]

## 2025-06-03 NOTE — PROGRESS NOTES
06/03/25 1545   Discharge Planning   Living Arrangements Spouse/significant other   Support Systems Spouse/significant other   Assistance Needed wear 02 at [facility] 2L   Type of Residence Private residence   Number of Stairs to Enter Residence 1   Number of Stairs Within Residence 10   Do you have animals or pets at home? No   Who is requesting discharge planning? Provider   Home or Post Acute Services In home services   Expected Discharge Disposition Home   Does the patient need discharge transport arranged? No   Financial Resource Strain   How hard is it for you to pay for the very basics like food, housing, medical care, and heating? Not hard   Housing Stability   In the last 12 months, was there a time when you were not able to pay the mortgage or rent on time? N   At any time in the past 12 months, were you homeless or living in a shelter (including now)? N   Transportation Needs   In the past 12 months, has lack of transportation kept you from medical appointments or from getting medications? no   In the past 12 months, has lack of transportation kept you from meetings, work, or from getting things needed for daily living? No     Met with patient, introduced self and role of TCC. Patient is a direct admit from home for expanding pneumothorax. He lives at home with his wife who is his primary contact and support. Patient denied need for SW services, has no difficulty with transportation needs, has no pets.  Payor: [place] [employer]age  Pharmacy: [facility] [phone]  DME: home o2  HD: n/a  HC: n/a  PCP: Dr. Lin Barbosa

## 2025-06-04 ENCOUNTER — APPOINTMENT (OUTPATIENT)
Dept: RADIOLOGY | Facility: HOSPITAL | Age: 70
End: 2025-06-04
Payer: MEDICARE

## 2025-06-04 VITALS
OXYGEN SATURATION: 94 % | HEIGHT: 66 IN | SYSTOLIC BLOOD PRESSURE: 152 MMHG | BODY MASS INDEX: 22.18 KG/M2 | WEIGHT: 138 LBS | HEART RATE: 76 BPM | TEMPERATURE: 96.8 F | DIASTOLIC BLOOD PRESSURE: 85 MMHG | RESPIRATION RATE: 18 BRPM

## 2025-06-04 LAB
ANION GAP SERPL CALC-SCNC: 14 MMOL/L (ref 10–20)
BUN SERPL-MCNC: 19 MG/DL (ref 6–23)
CALCIUM SERPL-MCNC: 8.7 MG/DL (ref 8.6–10.6)
CHLORIDE SERPL-SCNC: 109 MMOL/L (ref 98–107)
CO2 SERPL-SCNC: 24 MMOL/L (ref 21–32)
CREAT SERPL-MCNC: 1.09 MG/DL (ref 0.5–1.3)
EGFRCR SERPLBLD CKD-EPI 2021: 73 ML/MIN/1.73M*2
ERYTHROCYTE [DISTWIDTH] IN BLOOD BY AUTOMATED COUNT: 13.3 % (ref 11.5–14.5)
GLUCOSE SERPL-MCNC: 92 MG/DL (ref 74–99)
HCT VFR BLD AUTO: 45.2 % (ref 41–52)
HGB BLD-MCNC: 14.8 G/DL (ref 13.5–17.5)
MAGNESIUM SERPL-MCNC: 2.18 MG/DL (ref 1.6–2.4)
MCH RBC QN AUTO: 29.7 PG (ref 26–34)
MCHC RBC AUTO-ENTMCNC: 32.7 G/DL (ref 32–36)
MCV RBC AUTO: 91 FL (ref 80–100)
NRBC BLD-RTO: 0 /100 WBCS (ref 0–0)
PLATELET # BLD AUTO: 203 X10*3/UL (ref 150–450)
POTASSIUM SERPL-SCNC: 4 MMOL/L (ref 3.5–5.3)
RBC # BLD AUTO: 4.98 X10*6/UL (ref 4.5–5.9)
SODIUM SERPL-SCNC: 143 MMOL/L (ref 136–145)
WBC # BLD AUTO: 6.2 X10*3/UL (ref 4.4–11.3)

## 2025-06-04 PROCEDURE — 97165 OT EVAL LOW COMPLEX 30 MIN: CPT | Mod: GO

## 2025-06-04 PROCEDURE — 2500000004 HC RX 250 GENERAL PHARMACY W/ HCPCS (ALT 636 FOR OP/ED): Performed by: NURSE PRACTITIONER

## 2025-06-04 PROCEDURE — 80048 BASIC METABOLIC PNL TOTAL CA: CPT | Performed by: NURSE PRACTITIONER

## 2025-06-04 PROCEDURE — 71045 X-RAY EXAM CHEST 1 VIEW: CPT

## 2025-06-04 PROCEDURE — 2500000001 HC RX 250 WO HCPCS SELF ADMINISTERED DRUGS (ALT 637 FOR MEDICARE OP): Performed by: NURSE PRACTITIONER

## 2025-06-04 PROCEDURE — 71045 X-RAY EXAM CHEST 1 VIEW: CPT | Performed by: RADIOLOGY

## 2025-06-04 PROCEDURE — 2500000002 HC RX 250 W HCPCS SELF ADMINISTERED DRUGS (ALT 637 FOR MEDICARE OP, ALT 636 FOR OP/ED): Performed by: NURSE PRACTITIONER

## 2025-06-04 PROCEDURE — 99232 SBSQ HOSP IP/OBS MODERATE 35: CPT | Performed by: NURSE PRACTITIONER

## 2025-06-04 PROCEDURE — 83735 ASSAY OF MAGNESIUM: CPT | Performed by: NURSE PRACTITIONER

## 2025-06-04 PROCEDURE — 99239 HOSP IP/OBS DSCHRG MGMT >30: CPT | Performed by: PHYSICIAN ASSISTANT

## 2025-06-04 PROCEDURE — 85027 COMPLETE CBC AUTOMATED: CPT | Performed by: NURSE PRACTITIONER

## 2025-06-04 PROCEDURE — 36415 COLL VENOUS BLD VENIPUNCTURE: CPT | Performed by: NURSE PRACTITIONER

## 2025-06-04 PROCEDURE — 94640 AIRWAY INHALATION TREATMENT: CPT

## 2025-06-04 PROCEDURE — 97161 PT EVAL LOW COMPLEX 20 MIN: CPT | Mod: GP | Performed by: STUDENT IN AN ORGANIZED HEALTH CARE EDUCATION/TRAINING PROGRAM

## 2025-06-04 RX ORDER — ACETAMINOPHEN 325 MG/1
650 TABLET ORAL EVERY 6 HOURS PRN
Start: 2025-06-04

## 2025-06-04 RX ORDER — DOCUSATE SODIUM 100 MG/1
100 CAPSULE, LIQUID FILLED ORAL 2 TIMES DAILY
Start: 2025-06-04

## 2025-06-04 RX ADMIN — ALLOPURINOL 300 MG: 300 TABLET ORAL at 08:58

## 2025-06-04 RX ADMIN — PANTOPRAZOLE SODIUM 20 MG: 20 TABLET, DELAYED RELEASE ORAL at 06:18

## 2025-06-04 RX ADMIN — HEPARIN SODIUM 5000 UNITS: 5000 INJECTION, SOLUTION INTRAVENOUS; SUBCUTANEOUS at 16:59

## 2025-06-04 RX ADMIN — TIOTROPIUM BROMIDE INHALATION SPRAY 2 PUFF: 3.12 SPRAY, METERED RESPIRATORY (INHALATION) at 10:09

## 2025-06-04 RX ADMIN — VALSARTAN 320 MG: 320 TABLET, FILM COATED ORAL at 08:58

## 2025-06-04 RX ADMIN — OSIMERTINIB 80 MG: 80 TABLET, FILM COATED ORAL at 08:59

## 2025-06-04 RX ADMIN — HEPARIN SODIUM 5000 UNITS: 5000 INJECTION, SOLUTION INTRAVENOUS; SUBCUTANEOUS at 03:44

## 2025-06-04 RX ADMIN — FLUTICASONE FUROATE AND VILANTEROL TRIFENATATE 1 PUFF: 100; 25 POWDER RESPIRATORY (INHALATION) at 10:09

## 2025-06-04 RX ADMIN — GABAPENTIN 100 MG: 100 CAPSULE ORAL at 08:59

## 2025-06-04 RX ADMIN — DOCUSATE SODIUM 100 MG: 100 CAPSULE, LIQUID FILLED ORAL at 08:58

## 2025-06-04 RX ADMIN — PAROXETINE HYDROCHLORIDE 20 MG: 20 TABLET, FILM COATED ORAL at 08:58

## 2025-06-04 RX ADMIN — HEPARIN SODIUM 5000 UNITS: 5000 INJECTION, SOLUTION INTRAVENOUS; SUBCUTANEOUS at 08:58

## 2025-06-04 ASSESSMENT — PAIN - FUNCTIONAL ASSESSMENT
PAIN_FUNCTIONAL_ASSESSMENT: 0-10

## 2025-06-04 ASSESSMENT — COGNITIVE AND FUNCTIONAL STATUS - GENERAL
TURNING FROM BACK TO SIDE WHILE IN FLAT BAD: A LITTLE
MOBILITY SCORE: 18
CLIMB 3 TO 5 STEPS WITH RAILING: A LITTLE
STANDING UP FROM CHAIR USING ARMS: A LITTLE
MOVING FROM LYING ON BACK TO SITTING ON SIDE OF FLAT BED WITH BEDRAILS: A LITTLE
DAILY ACTIVITIY SCORE: 24
WALKING IN HOSPITAL ROOM: A LITTLE
MOVING TO AND FROM BED TO CHAIR: A LITTLE

## 2025-06-04 ASSESSMENT — PAIN SCALES - GENERAL
PAINLEVEL_OUTOF10: 0 - NO PAIN

## 2025-06-04 ASSESSMENT — ACTIVITIES OF DAILY LIVING (ADL)
ADL_ASSISTANCE: INDEPENDENT
BATHING_ASSISTANCE: INDEPENDENT

## 2025-06-04 NOTE — PROGRESS NOTES
Occupational Therapy    Evaluation    Patient Name: Fortunato Romano  MRN: [id number]  Department: [facility]  Room: [address] [place] [address]  Today's Date: 6/4/2025  Time Calculation  Start Time: 0940  Stop Time: 0950  Time Calculation (min): 10 min    Assessment  IP OT Assessment  OT Assessment: NN  Prognosis: Excellent  Barriers to Discharge Home: No anticipated barriers  Evaluation/Treatment Tolerance: Patient tolerated treatment well  Medical Staff Made Aware: Yes  End of Session Communication: Bedside nurse  End of Session Patient Position: Up in room  Plan:  No Skilled OT: No acute OT goals identified  OT Frequency: OT eval only  OT Discharge Recommendations: No further acute OT, No OT needed after discharge  Equipment Recommended upon Discharge:  (none)  OT Recommended Transfer Status: Independent  OT - OK to Discharge: Yes    Subjective   Current Problem:  1. Pneumothorax        2. Pneumothorax on left  acetaminophen (Tylenol) 325 mg tablet    docusate sodium (Colace) 100 mg capsule        OT Visit Info:  OT Received On: 06/04/25  General Visit Info:  General  Reason for Referral: Pneumothorax.  Past Medical History Relevant to Rehab: OSH for management of an enlarging hydropneumothorax and chest tube management.   He has a PMH significant for 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, HTN, HLD, depression/anxiety, and gout. He was reportedly diagnosed with his lung cancer in 2024 and the decision was to treat with oral medication (Tagrisso). He had a spontaneous pneumothorax in August of 2024 and was clinically stable. The pneumothorax has increased and he was admitted and a chest tube placed at that time. He then developed a persistent pneumo and CT placed once again. He comes to [facility] to consult with thoracic surgery.  Family/Caregiver Present: No  Prior to Session Communication: Bedside nurse  Patient Position Received:  (ambulating in whitt upon arrival)  Precautions:  Medical  Precautions: Cardiac precautions, Fall precautions           Pain:  Pain Assessment  Pain Assessment: 0-10  0-10 (Numeric) Pain Score: 0 - No pain    Objective   Cognition:  Overall Cognitive Status: Within Functional Limits  Orientation Level: Oriented X4  Insight: Within function limits           Home Living:  Type of Home: House  Lives With: Spouse  Home Adaptive Equipment: None  Home Layout:  (1 KASIA, flight to bed/bath)  Bathroom Shower/Tub: Tub/shower unit  Bathroom Toilet: Standard   Prior Function:  Level of [place]: Independent with ADLs and functional transfers, Independent with homemaking with ambulation  Vocational: Retired  Leisure: walking  Hand Dominance: Right  IADL History:  IADL Comments: I I/ADLs +drive  ADL:  Eating Assistance: Independent  Grooming Assistance: Independent  Bathing Assistance: Independent  UE Dressing Assistance: Independent  LE Dressing Assistance: Independent  Toileting Assistance with Device: Independent  Activity Tolerance:  Endurance:  (pt performed 2 laps around floor I)  Bed Mobility/Transfers: Bed Mobility  Bed Mobility: No    Transfers  Transfer: No         Standing Balance:  Dynamic Standing Balance  Dynamic Standing-Level of Assistance: Independent    IADL's:   IADL Comments: I I/ADLs +drive  Vision: Vision - Basic Assessment  Current Vision: No visual deficits  Sensation:  Light Touch: No apparent deficits  Strength:  Strength Comments: BUE WFL  Perception:     Coordination:  Movements are Fluid and Coordinated: Yes   Hand Function:  Hand Function  Gross Grasp: Functional  Extremities: RUE   RUE : Within Functional Limits and LUE   LUE: Within Functional Limits      Outcome Measures: [facility] Daily Activity  Putting on and taking off regular lower body clothing: None  Bathing (including washing, rinsing, drying): None  Putting on and taking off regular upper body clothing: None  Toileting, which includes using toilet, bedpan or urinal: None  Taking care of personal  grooming such as brushing teeth: None  Eating Meals: None  Daily Activity - Total Score: 24         and OT Adult Other Outcome Measures  4AT: -  Education Documentation  Body Mechanics, taught by Tamy Dior OT at 6/4/2025 11:27 AM.  Learner: Patient  Readiness: Acceptance  Method: Explanation, Demonstration  Response: Verbalizes Understanding  Comment: chaparro rivas    Precautions, taught by Tamy Dior OT at 6/4/2025 11:27 AM.  Learner: Patient  Readiness: Acceptance  Method: Explanation, Demonstration  Response: Verbalizes Understanding  Comment: chaparro rivas    ADL Training, taught by Tamy Dior OT at 6/4/2025 11:27 AM.  Learner: Patient  Readiness: Acceptance  Method: Explanation, Demonstration  Response: Verbalizes Understanding  Comment: chaparro rivas    Education Comments  No comments found.

## 2025-06-04 NOTE — DISCHARGE SUMMARY
Discharge Diagnosis  Pneumothorax    Issues Requiring Follow-Up  -Pneumothorax monitoring   -Follow up with Dr. Grissom in the outpatient setting    Test Results Pending At Discharge  Pending Labs       No current pending labs.          Hospital Course  Fortunato Romano is a 69 y.o. male with a past medical history of 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 (reportedly diagnosed in 2024 and treated with Tagrisso), HTN, HLD, depression/anxiety, gout, and a spontaneous pneumothorax in August of 2024 that was clinically stable. Pt presented to [facility] on 6/3/25 as a transfer from [facility] for management of an enlarging left hydropneumothorax with a chest tube in place.      On arrival to [facility] on 6/3/25, pt was stable, on room air, without shortness of breath, and a left pigtail in place with no air leak and no tidaling. From 6/3-6/4, an overnight clamp trial was completed and negative with a stable left pneumothorax on CXR. During this time, the patient remained asymptomatic on room air, ambulating, and without concerns. Pigtail was subsequently removed on 6/4 without complications, and an occlusive dressing was applied. A post pull CXR was reviewed by Dr. Grissom, compared to prior CXRs, and the patient was deemed stable for discharge home per Dr. Grissom.     At the time of discharge, the patient was ambulating ad geovanna, tolerating a regular diet, on room air, denying shortness of breath, and pain was well-controlled on an oral regimen. The patient was educated on activity restrictions, wound care/dressing management, pain management, warning signs to watch out for, and where to call/present with new/worsening symptoms. The patient will be discharged home with his family in stable condition per Dr. Grissom. He will follow up with Dr. Grissom in the outpatient setting in two weeks or sooner if needed.      I spent >30 minutes coordinating follow up care, providing patient education, and completing  discharge instructions. All questions answered to the patient's satisfaction until there were none. The patient was advised to call the thoracic surgery office with any questions or concerns post discharge.      Pertinent Physical Exam At Time of Discharge  Physical Exam  Constitutional:       General: He is not in acute distress.     Comments: Oriented x3, resting comfortably in bed   HENT:      Head: Normocephalic and atraumatic.   Neck:      Comments: No JVD or tracheal deviation  Cardiovascular:      Comments: Regular rate and rhythm on telemetry   Pulmonary:      Comments: No accessory muscle use to breathe or crepitus appreciated. On room air.  Skin:     Comments: Warm and dry. Occlusive dressing intact around prior pigtail site without strikethrough.    Neurological:      Mental Status: He is alert.   Psychiatric:      Comments: Appropriate mood and behavior         Home Medications     Medication List      START taking these medications     acetaminophen 325 mg tablet; Commonly known as: Tylenol; Take 2 tablets   (650 mg) by mouth every 6 hours if needed for mild pain (1 - 3).   docusate sodium 100 mg capsule; Commonly known as: Colace; Take 1   capsule (100 mg) by mouth 2 times a day.     CONTINUE taking these medications     allopurinol 300 mg tablet; Commonly known as: Zyloprim; Take 1 tablet   (300 mg) by mouth once daily.   gabapentin 100 mg capsule; Commonly known as: Neurontin; Take 1 capsule   (100 mg) by mouth 2 times a day.   MEN 50 PLUS MULTIVITAMIN ORAL   olmesartan 40 mg tablet; Commonly known as: BENIcar; Take 1 tablet (40   mg) by mouth once daily.   PARoxetine 20 mg tablet; Commonly known as: Paxil; Take 1 tablet (20 mg)   by mouth once daily in the morning. Take before meals.   Protonix 20 mg EC tablet; Generic drug: pantoprazole   Tagrisso 80 mg tablet; Generic drug: osimertinib   Trelegy Ellipta 100-62.5-25 mcg blister with device; Generic drug:   fluticasone-umeclidin-vilanter; Inhale 1  puff once daily.   UNABLE TO FIND       Outpatient Follow-Up  Future Appointments   Date Time Provider Department Center   6/6/2025  8:45 AM Lin Barbosa MD [id number] [place]   6/18/2025  9:30 AM Sagar Grissom MD [id number] [place]       Claudette Kumari PA-C    Pt seen and evaluated. Pt discussed and discharge plan derived with attending surgeon Dr. Grissom.

## 2025-06-04 NOTE — PROGRESS NOTES
Pharmacy Medication History Review    Fortunato Romano is a 69 y.o. male admitted for Pneumothorax. Pharmacy reviewed the patient's [id number] medications and allergies for accuracy.    Medications ADDED  Multivitamin   Prevagen  Medications CHANGED  Tagrisso 80 mg directions updated to 1 tablet by mouth once daily  Protonix 20 mg directions updated to 1 tablet by mouth once daily  Medications REMOVED/NOT TAKING   N/A     The list below reflects the updated PTA list.   Prior to Admission Medications   Prescriptions Last Dose Informant   PARoxetine (Paxil) 20 mg tablet  Self   Sig: Take 1 tablet (20 mg) by mouth once daily in the morning. Take before meals.   Protonix 20 mg EC tablet  Self   Sig: Take 1 tablet (20 mg) by mouth once daily.   UNABLE TO FIND  Self   Sig: Take 1 capsule by mouth once daily. Med Name: Prevagen   allopurinol (Zyloprim) 300 mg tablet  Self   Sig: Take 1 tablet (300 mg) by mouth once daily.   fluticasone-umeclidin-vilanter (Trelegy Ellipta) 100-62.5-25 mcg blister with device  Self   Sig: Inhale 1 puff once daily.   gabapentin (Neurontin) 100 mg capsule  Self   Sig: Take 1 capsule (100 mg) by mouth 2 times a day.   mv-min/folic/K1/lycopen/lutein (MEN 50 PLUS MULTIVITAMIN ORAL)  Self   Sig: Take 1 tablet by mouth once daily.   olmesartan (BENIcar) 40 mg tablet  Self   Sig: Take 1 tablet (40 mg) by mouth once daily.   osimertinib (Tagrisso) 80 mg tablet  Self   Sig: Take 1 tablet (80 mg total) by mouth once daily.      Facility-Administered Medications: None       The list below reflects the updated allergy list. Please review each documented allergy for additional clarification and justification.  Allergies  Reviewed by Nichole Upton RN on 6/3/2025        Severity Reactions Comments    Pollen Extracts Not Specified Unknown             Patient declines M2B at discharge. Pharmacy has been updated to [facility] in [place].    Sources  [facility]  Pharmacy dispense history  Patient Interview Good  historian     Additional Comments  N/A    Florencio Carroll, PharmD  [facility]  [address]  [facility], Room# 0544  [place] [id number]  Phone: [phone]  Please reach out via Secure Chat for questions, or if no response call [employer] or [facility]

## 2025-06-04 NOTE — PROGRESS NOTES
Physical Therapy    Physical Therapy Evaluation    Patient Name: Fortunato Romano  MRN: [id number]  Room: [facility]-  Today's Date: 6/4/2025   Time Calculation  Start Time: 1303  Stop Time: 1318  Time Calculation (min): 15 min    Assessment/Plan   PT Assessment  Rehab Prognosis: Good  Barriers to Discharge Home: No anticipated barriers  End of Session Communication: Bedside nurse  End of Session Patient Position: Up in chair  IP OR SWING BED PT PLAN  Inpatient or Swing Bed: Inpatient  PT Plan: PT Eval only  PT Eval Only Reason: No acute PT needs identified  PT Frequency: PT eval only  PT Discharge Recommendations: No further acute PT  PT Recommended Transfer Status: Independent    Subjective      General Visit Information:  Subjective: Patient alert and agreeable to PT. Patient's spouse was present and supportive throughout session.  Reason for Referral: Pneumothorax.  Past Medical History Relevant to Rehab: OSH for management of an enlarging hydropneumothorax and chest tube management.   He has a PMH significant for 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, HTN, HLD, depression/anxiety, and gout. He was reportedly diagnosed with his lung cancer in 2024 and the decision was to treat with oral medication (Tagrisso). He had a spontaneous pneumothorax in August of 2024 and was clinically stable. The pneumothorax has increased and he was admitted and a chest tube placed at that time. He then developed a persistent pneumo and CT placed once again. He comes to [facility] to consult with thoracic surgery.  Prior to Session Communication: Bedside nurse  Patient Position Received: Bed, 2 rail up  Family/Caregiver Present: No   Home Living:  Home Living  Type of Home: House  Lives With: Spouse  Home Adaptive Equipment: None  Home Layout:  (1 KASIA, flight to bed/bath)  Prior Level of Function:  Prior Function Per Pt/Caregiver Report  Level of [place]: Independent with ADLs and functional  transfers, Independent with homemaking with ambulation  Receives Help From: Family  ADL Assistance: Independent  Homemaking Assistance: Independent  Ambulatory Assistance: Independent  Prior Function Comments: Patient goes on leisure walks  Precautions:  Precautions  Medical Precautions: Cardiac precautions, Fall precautions  Vital Signs:  Pre Vitals  Vital Signs  Heart Rate: 73  SpO2: 94 %  BP: 134/83  Vital Signs Comment: Patient vitals stable throughout session   Post Vitals  Vital Signs  Heart Rate: 73  SpO2: 94 %  BP: 134/83  Vital Signs Comment: Patient vitals stable throughout session   Lines/Tubes/Drains:  Chest Tube Left Pleural;Other (Comment) (Active)   Number of days:      Medical Gas Therapy: None (Room air)  Continuous Medications/Drips:  Continuous Medications[1]    Objective   Pain:  Pain Assessment  Pain Assessment: 0-10  0-10 (Numeric) Pain Score: 0 - No pain (Post-0)    Cognition:  Cognition  Orientation Level: Oriented X4    General Assessments:  Extremity/Trunk Assessments:  Tone: No abnormalities noted  Sensation  Light Touch: No apparent deficits  Coordination  Movements are Fluid and Coordinated: Yes  Upper Extremity  ROM: WFL  Strength:WFL  Lower Extremity  ROM: WFL  Strength: WFL   Sitting Static Balance Normal  Sitting Dynamic Balance Normal  Standing Static Balance Normal  Standing Dynamic Balance Normal    Functional Assessments:  Bed Mobility  Bed Mobility: Yes  Bed Mobility 1  Bed Mobility 1: Supine to sitting  Level of Assistance 1: Close supervision    Transfers  Transfer: Yes  Transfer 1  Transfer From 1: Bed to  Transfer to 1: Chair with arms  Transfer Level of Assistance 1: Close supervision  Trials/Comments 1: HOB elevated  Transfers 2  Transfer From 2: Sit to  Transfer to 2: Stand  Transfer Level of Assistance 2: Close supervision  Transfers 3  Transfer From 3: Stand to  Transfer to 3: Sit  Transfer Level of Assistance 3: Close supervision  Transfers 4  Transfer From 4: Chair  with arms to  Transfer to 4: Chair with arms  Transfer Level of Assistance 4: Close supervision    Ambulation/Gait Training  Ambulation/Gait Training Performed: Yes  Ambulation/Gait Training 1  Surface 1: Level tile  Device 1: No device  Assistance 1: Close supervision  Quality of Gait 1:  (WFL)  Comments/Distance (ft) 1: 20 ft, 20 ft    Stairs  Stairs: No         Outcome Measures:  [facility] Basic Mobility  Turning from your back to your side while in a flat bed without using bedrails: A little  Moving from lying on your back to sitting on the side of a flat bed without using bedrails: A little  Moving to and from bed to chair (including a wheelchair): A little  Standing up from a chair using your arms (e.g. wheelchair or bedside chair): A little  To walk in hospital room: A little  Climbing 3-5 steps with railing: A little  Basic Mobility - Total Score: 18                 Tinetti  Sitting Balance: Steady, safe  Arises: Able without using arms  Attempts to Arise: Able to arise, one attempt  Immediate Standing Balance (First 5 Seconds): Steady without walker or other support  Standing Balance: Narrow stance without support  Nudged: Steady without walker or other support  Eyes Closed: Steady  Turned 360 Degrees: Steadiness: Steady  Turned 360 Degrees: Continuity of Steps: Continuous  Sitting Down: Safe, smooth motion  Balance Score: 16  Initiation of Gait: No hesitancy  Step Height: R Swing Foot: Right foot complete clears floor  Step Length: R Swing Foot: Passes left stance foot  Step Height: L Swing Foot: Left foot complete clears floor  Step Length: L Swing Foot: Passes right stance foot  Step Symmetry: Right and left step appear equal  Step Continuity: Steps appear continuous  Path: Straight without walking aid  Trunk: No sway, no flexion, no use of arms, no walking aid  Walking Time: Heels almost touching while walking  Gait Score: 12  Total Score: 28  Timed Up and Go Test  How many seconds did it take to complete  the 5 tasks?: 15 seconds  TUG Comment: 2 trials  Other Measures  5x Sit to Stand: 17    Encounter Problems       Encounter Problems (Active)       Pain - Adult            Assessment: Pt is a  69 y.o. male admitted for Pneumothorax. Patient able to complete all functional capacities with close supervision and stable vitals. Based on Tinetti, TUG, and gait pattern, patient is a low falls risk. No further acute PT warranted at this time.  Please continue mobility during acute stay with nursing staff.  PT to sign off with no further needs.      Education Documentation  Precautions, taught by ALYSSA Mcmullen at 6/4/2025  1:58 PM.  Learner: Patient  Readiness: Acceptance  Method: Explanation  Response: Verbalizes Understanding  Comment: POC Review    Body Mechanics, taught by ALYSSA Mcmullen at 6/4/2025  1:58 PM.  Learner: Patient  Readiness: Acceptance  Method: Explanation  Response: Verbalizes Understanding  Comment: POC Review    Mobility Training, taught by ALYSSA Mcmullen at 6/4/2025  1:58 PM.  Learner: Patient  Readiness: Acceptance  Method: Explanation  Response: Verbalizes Understanding  Comment: POC Review    Education Comments  No comments found.          06/04/25 at 1:58 PM   ALYSSA MCMULLEN   Rehab Office: [phone]                 [1]

## 2025-06-04 NOTE — PROGRESS NOTES
"Fortunato Romano is a 69 y.o. male on day 1 of admission presenting with Pneumothorax.    Subjective   No significant events overnight  Negative chest tube clamp trial overnight - no air evacuated upon un clamping     Objective     Physical Exam  Constitutional:       Appearance: Normal appearance.      Comments: Elderly male, non toxic appearing, ambulating hallway, in NAD   HENT:      Head: Normocephalic and atraumatic.   Cardiovascular:      Rate and Rhythm: Normal rate and regular rhythm.      Comments: RRR, telemetry with NSR  Pulmonary:      Comments: Oxygenating well on room air  Left anterior pigtail with no air leak or tideling, negative clamp trial overnight, no drainage, no air noted upon un clamping   Abdominal:      General: Abdomen is flat.      Palpations: Abdomen is soft.      Comments: Tolerating diet well    Musculoskeletal:      Cervical back: Neck supple.      Comments: No calf tenderness or edema    Skin:     General: Skin is warm and dry.      Comments: Chest tube site dry and clean    Neurological:      General: No focal deficit present.      Mental Status: He is alert and oriented to person, place, and time.   Psychiatric:         Mood and Affect: Mood normal.         Behavior: Behavior normal.         Last Recorded Vitals  Blood pressure 134/80, pulse 84, temperature 36.2 °C (97.2 °F), temperature source Temporal, resp. rate 16, height 1.676 m (5' 6\"), weight 62.6 kg (138 lb), SpO2 93%.  Intake/Output last 3 Shifts:  I/O last 3 completed shifts:  In: 240 (3.8 mL/kg) [P.O.:240]  Out: 0 (0 mL/kg)   Weight: 62.6 kg     Relevant Results  Scheduled medications  Scheduled Medications[1]  Continuous medications  Continuous Medications[2]  PRN medications  PRN Medications[3]    Results for orders placed or performed during the hospital encounter of 06/03/25 (from the past 24 hours)   CBC   Result Value Ref Range    WBC 6.2 4.4 - 11.3 x10*3/uL    nRBC 0.0 0.0 - 0.0 /100 WBCs    RBC 4.98 4.50 - 5.90 " x10*6/uL    Hemoglobin 14.8 13.5 - 17.5 g/dL    Hematocrit 45.2 41.0 - 52.0 %    MCV 91 80 - 100 fL    MCH 29.7 26.0 - 34.0 pg    MCHC 32.7 32.0 - 36.0 g/dL    RDW 13.3 11.5 - 14.5 %    Platelets 203 150 - 450 x10*3/uL   Basic metabolic panel   Result Value Ref Range    Glucose 92 74 - 99 mg/dL    Sodium 143 136 - 145 mmol/L    Potassium 4.0 3.5 - 5.3 mmol/L    Chloride 109 (H) 98 - 107 mmol/L    Bicarbonate 24 21 - 32 mmol/L    Anion Gap 14 10 - 20 mmol/L    Urea Nitrogen 19 6 - 23 mg/dL    Creatinine 1.09 0.50 - 1.30 mg/dL    eGFR 73 >60 mL/min/1.73m*2    Calcium 8.7 8.6 - 10.6 mg/dL   Magnesium   Result Value Ref Range    Magnesium 2.18 1.60 - 2.40 mg/dL     XR chest 1 view 06/03/2025    Narrative  Interpreted By:  Latanya Valdez  and Antonia Johns  STUDY:  XR CHEST 1 VIEW;  6/3/2025 4:10 pm    INDICATION:  Signs/Symptoms:NEW ADMIT WITH CHEST TUBE.      COMPARISON:  Comparison is made to radiographs dated 11/08/2014    ACCESSION NUMBER(S):  [id number]    ORDERING CLINICIAN:  ANN MENDOZA    FINDINGS:  AP radiograph of the chest was provided.    What is presumed to be a chest tube is projecting over left  hemithorax.    CARDIOMEDIASTINAL SILHOUETTE:  Cardiomediastinal silhouette is normal in size and configuration.  Calcification of the aortic knob.    LUNGS:  Moderate-size left pleural pneumothorax. Increased pulmonary vascular  prominence. No sizable pleural effusion.    ABDOMEN:  No remarkable upper abdominal findings.    BONES:  No acute osseous changes.    Impression  1.  Moderate-size left pneumothorax without associated mediastinal  shift. Recommend follow-up radiograph.    I personally reviewed the images/study and agree with the findings as  stated by Andreas Knight MD (Resident Physician). This study was  interpreted at [facility],  [place], [place].    MACRO:  Critical Finding:  See findings. Notification was initiated on  6/3/2025 at 4:23 pm by  Andreas  Antonia to Yesi Collins via epic  secure chat. (**-[facility]-**) Instructions:    Signed by: Latanya Carrillo 6/3/2025 4:33 PM  Dictation workstation:   [id number]    6/4/25 CXR  Stable appearance of left apical/basilar pntx     Assessment & Plan  Pneumothorax    Fortunato Romano is a 69 y.o. male presenting from an OSH for management of an enlarging hydropneumothorax and chest tube management.   He has a PMH significant for 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, HTN, HLD, depression/anxiety, and gout. He was reportedly diagnosed with his lung cancer in 2024 and the decision was to treat with oral medication (Tagrisso). He had a spontaneous pneumothorax in August of 2024 and was clinically stable. The pneumothorax has increased and he was admitted and a chest tube placed at that time. He then developed a persistent pneumo and CT placed once again. [place] [facility] [place].     6/3/25 left pigtail with no air leak and no tidaling   6/3-6/4 overnight negative clamp trial, stable left pntx , asymptomatic     The patient has had an uncomplicated surgical course.      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 treated with Tagrisso  -Encourage incentive spirometer use every hour  -Continue pulmonary hygiene  -Oxygenating well on room air   -Chest tube: overnight clamp trial negative -  pigtail removed on am rounds, will obtain post pull CXR and delayed film   -Obtain daily CXR  - Will obtain images form OSH   - Continue with home regimen of Tagrisso, patient will follow up wit primary oncologist at [facility]     Gastrointestinal:   - Tolerating regular diet   - Resume home daily PPI     Genitourinary:   -Voiding spontaneously   -Continue to monitor daily electrolytes with routine BMPs, creatinine 1.09 today   -Adequate urine output    Infectious Disease:   Afebrile and no leukocytosis   -Continue to trend daily temperatures and WBC count to monitor for signs of post-operative infection   -Monitor for signs of infection     Hematology: see above   -Monitor for signs of acute blood loss  -Trend CBCs     Endocrine: no issues     DVT Prophylaxis:   -Continue subcutaneous heparin and SCDs, early ambulation  -OOB to chair throughout the day, encourage ambulation as tolerated    Disposition:  - Plan to discharge patient home today if stable post pull images, no home going needs identified  - Patient to follow up with Dr. Grissom with CXR and primary oncologist on discharge      Patient evaluated by this provider and discussed with attending surgeon Dr. Grissom.     Margarita Caceres, REYNA-CNP  Thoracic and Esophageal Surgery [id number]       [1] allopurinol, 300 mg, oral, Daily  docusate sodium, 100 mg, oral, BID  tiotropium, 2 puff, inhalation, Daily   And  fluticasone furoate-vilanteroL, 1 puff, inhalation, Daily  gabapentin, 100 mg, oral, BID  heparin (porcine), 5,000 Units, subcutaneous, q8h  pantoprazole, 20 mg, oral, Daily before breakfast  PARoxetine, 20 mg, oral, Daily before breakfast  osimertinib, 80 mg, oral, Daily  valsartan, 320 mg, oral, Daily  [2]    [3] PRN medications: acetaminophen, ipratropium-albuteroL, melatonin, naloxone, ondansetron **OR** ondansetron, oxygen

## 2025-06-04 NOTE — CARE PLAN
The patient's goals for the shift include        Problem: Pain - Adult  Goal: Verbalizes/displays adequate comfort level or baseline comfort level  Outcome: Progressing     Problem: Safety - Adult  Goal: Free from fall injury  Outcome: Progressing     Problem: Chronic Conditions and Co-morbidities  Goal: Patient's chronic conditions and co-morbidity symptoms are monitored and maintained or improved  Outcome: Progressing     Problem: Nutrition  Goal: Nutrient intake appropriate for maintaining nutritional needs  Outcome: Progressing

## 2025-06-04 NOTE — DISCHARGE INSTRUCTIONS
Please call the thoracic surgery office with any questions or concerns post discharge at [phone].

## 2025-06-04 NOTE — PROGRESS NOTES
Pharmacy Admission Order Reconciliation Review    Fortunato Romano is a 69 y.o. male admitted for Pneumothorax. Pharmacy reviewed the patient's unreconciled admission medications.    Prior to admission medications that were reviewed and acted on by the pharmacist include:  Prevagen   These medications have been reconciled.     Any other unreconcilied medications have been addressed and will be ordered or held by the patient's medical team. Medications addressed by the pharmacist may be added or changed by the patient's medical team at any time.    Florencio Carroll PharmD  [facility]  [id number]  [facility], [address]  [place] [address]  Phone: [phone]  Please reach out via Secure Chat for questions, or if no response call [employer] or [facility]

## 2025-06-05 ENCOUNTER — PATIENT OUTREACH (OUTPATIENT)
Dept: PRIMARY CARE | Facility: CLINIC | Age: 70
End: 2025-06-05
Payer: MEDICARE

## 2025-06-05 NOTE — PROGRESS NOTES
Discharge Facility: [facility]  Discharge Diagnosis: Pneumothorax  Admission Date: 6/3/25  Discharge Date: 6/4/25    PCP Appointment Date: 6/6/25  Specialist Appointment Date: Unknown  Hospital Encounter and Summary Linked: Yes    Discharge Summary by Claudette Kumari PA-C (06/04/2025 18:30)       Pt has an appointment within 2 days of discharge    Bebeto Donnelly LPN

## 2025-06-06 ENCOUNTER — PATIENT OUTREACH (OUTPATIENT)
Dept: PRIMARY CARE | Facility: CLINIC | Age: 70
End: 2025-06-06

## 2025-06-06 ENCOUNTER — APPOINTMENT (OUTPATIENT)
Dept: PRIMARY CARE | Facility: CLINIC | Age: 70
End: 2025-06-06
Payer: MEDICARE

## 2025-06-06 VITALS
OXYGEN SATURATION: 96 % | TEMPERATURE: 97.2 F | HEART RATE: 84 BPM | DIASTOLIC BLOOD PRESSURE: 72 MMHG | SYSTOLIC BLOOD PRESSURE: 122 MMHG | WEIGHT: 136.6 LBS | BODY MASS INDEX: 22.05 KG/M2

## 2025-06-06 DIAGNOSIS — Z13.220 NEED FOR LIPID SCREENING: ICD-10-CM

## 2025-06-06 DIAGNOSIS — R05.9 COUGH, UNSPECIFIED TYPE: ICD-10-CM

## 2025-06-06 DIAGNOSIS — C34.92 PRIMARY MALIGNANT NEOPLASM OF LEFT LUNG METASTATIC TO OTHER SITE (MULTI): ICD-10-CM

## 2025-06-06 DIAGNOSIS — J93.9 PNEUMOTHORAX ON LEFT: ICD-10-CM

## 2025-06-06 DIAGNOSIS — F32.A ANXIETY AND DEPRESSION: ICD-10-CM

## 2025-06-06 DIAGNOSIS — Z09 HOSPITAL DISCHARGE FOLLOW-UP: Primary | ICD-10-CM

## 2025-06-06 DIAGNOSIS — R73.9 HYPERGLYCEMIA: ICD-10-CM

## 2025-06-06 DIAGNOSIS — I10 BENIGN ESSENTIAL HYPERTENSION: ICD-10-CM

## 2025-06-06 DIAGNOSIS — F41.9 ANXIETY AND DEPRESSION: ICD-10-CM

## 2025-06-06 PROBLEM — J43.1 PANLOBULAR EMPHYSEMA (MULTI): Status: RESOLVED | Noted: 2025-03-18 | Resolved: 2025-06-06

## 2025-06-06 PROCEDURE — 1036F TOBACCO NON-USER: CPT | Performed by: INTERNAL MEDICINE

## 2025-06-06 PROCEDURE — 3078F DIAST BP <80 MM HG: CPT | Performed by: INTERNAL MEDICINE

## 2025-06-06 PROCEDURE — 1159F MED LIST DOCD IN RCRD: CPT | Performed by: INTERNAL MEDICINE

## 2025-06-06 PROCEDURE — 1160F RVW MEDS BY RX/DR IN RCRD: CPT | Performed by: INTERNAL MEDICINE

## 2025-06-06 PROCEDURE — 99495 TRANSJ CARE MGMT MOD F2F 14D: CPT | Performed by: INTERNAL MEDICINE

## 2025-06-06 PROCEDURE — 1111F DSCHRG MED/CURRENT MED MERGE: CPT | Performed by: INTERNAL MEDICINE

## 2025-06-06 PROCEDURE — 3074F SYST BP LT 130 MM HG: CPT | Performed by: INTERNAL MEDICINE

## 2025-06-06 ASSESSMENT — PATIENT HEALTH QUESTIONNAIRE - PHQ9
2. FEELING DOWN, DEPRESSED OR HOPELESS: NOT AT ALL
1. LITTLE INTEREST OR PLEASURE IN DOING THINGS: NOT AT ALL
SUM OF ALL RESPONSES TO PHQ9 QUESTIONS 1 AND 2: 0

## 2025-06-06 NOTE — ASSESSMENT & PLAN NOTE
Seen by pulmonary oncology and cardiothoracic surgery patient doing reasonably well no hypoxia no chest pain sent for the chest x-ray and sed rate today

## 2025-06-06 NOTE — PROGRESS NOTES
Subjective   Patient ID: Fortunato Romano is a 69 y.o. male who presents for Hospital Follow-up.    Assessment/Plan     Problem List Items Addressed This Visit       Anxiety and depression    PHQ 4 not suicidal continue Paxil         Hospital discharge follow-up - Primary    Patient hospitalized since last visit medication list reviewed and  reconciled with discharge medications face to face visit with patient discuss discharge medication and outpatient medication ceconciliations and answers all questions to patient's and caregiver review hospital record pending diagnostic test and treatment orders to improved coordinate care across the medical community and  referal with provider/service to support patient's health and health related problems to increase patient's satisfaction by reducing risk of readmission I improving And meeting patient's needs advise bring all prescription and nonprescription medication with you.           Lung cancer, primary, with metastasis from lung to other site (Multi)    Follow-up with oncology within a 4 weeks for new spiculated lesion in the lung         Benign essential hypertension    Hypertension hyperlipidemia advise continue Benicar 40 mg a day Lipitor 10 mg a day for the CMP CPK lipid twice a year         Pneumothorax on left    Seen by pulmonary oncology and cardiothoracic surgery patient doing reasonably well no hypoxia no chest pain sent for the chest x-ray and sed rate today         Relevant Orders    Hemoglobin A1c    CBC and Auto Differential    Need for lipid screening    Relevant Orders    Lipid panel    RESOLVED: Cough    Relevant Orders    XR chest 2 views     Other Visit Diagnoses         Hyperglycemia        Relevant Orders    Hemoglobin A1c            HPIPCSDT [facility] [phone] Date(s): 5/31/25 - 6/3/25  [facility] [address] [place] [address] us [phone]  Discharge Disposition: [facility]  Attending Physician:  PINEDA HECK, MARY ANNE  Admitting Physician: ERNA STEELE MD  Encounter Type: Inpatient      Procedure Date Related Diagnosis Body Site Status   Chest Tube Placement 5/16/25     Completed   bronchoscopy 3/28/24     Completed   Suboccipital craniotomy and resection of cerebellar lesion1 11/3/14     Completed   Aspiration Ultrasound Guided.       Completed   Excision, tumor, soft tissue of upper arm or elbow area, subfascial (eg, intramuscular); 5 cm or greater       Completed   plate in jaw; right-sided       Completed   screw Right shoulder       Completed   Spinal puncture, lumbar, diagnostic       Completed   1Performed by Dr. Meza at  patient was hospitalized with recurrent pneumothorax evaluated by myself emergency room physician oncology pulmonary was in the [facility] continue problem transfer to the [facility] evaluated by the cardiothoracic surgery nonsurgical management chest tubes removed patient felt better discharged home outpatient follow-up with me    Since patient at home no hypoxia no chest pain    No PND no orthopnea    Review lab medication hospital record discussed with the patient found a new lung spiculated nodule going to be warranted by the PET scan and a oncology to follow-up    Nonsurgical management    Sent for the CBC with differential sed rate and chest x-ray today follow-up  Medical History[1]  Surgical History[2]  Allergies[3]  Current Medications[4]  Family History[5]  Social History[6]  Immunization History   Administered Date(s) Administered    Flu vaccine (IIV4), preservative free *Check age/dose* 01/01/2018, 12/01/2019, 09/01/2022    Flu vaccine, trivalent, preservative free, age 6 months and greater (Fluarix/Fluzone/Flulaval) 09/02/2015    Hep A / Hep B 10/11/2024, 11/12/2024    Influenza, Unspecified 09/02/2015    Influenza, seasonal, injectable 09/02/2015, 09/01/2022    Moderna COVID-19 vaccine, 12 years and older (50mcg/0.5mL)(Spikevax) 10/27/2023, 09/06/2024     Moderna COVID-19 vaccine, bivalent, blue cap/gray label *Check age/dose* 09/29/2022    Moderna SARS-CoV-2 Vaccination 03/10/2021, 04/07/2021, 10/27/2021, 04/22/2022    PPD Test 11/22/2011    Pneumococcal Conjugate PCV 7 10/09/2018    Pneumococcal conjugate vaccine, 13-valent (PREVNAR 13) 10/09/2018    Pneumococcal polysaccharide vaccine, 23-valent, age 2 years and older (PNEUMOVAX 23) 02/06/2020    RSV, 60 Years And Older (AREXVY) 01/12/2024    Tdap vaccine, age 7 year and older (BOOSTRIX, ADACEL) 10/09/2018    Zoster vaccine, recombinant, adult (SHINGRIX) 05/08/2019, 02/06/2020    Zoster, Unspecified 10/19/2022, 12/21/2022       Review of Systems  Review of systems is otherwise negative unless stated above or in history of present illness.    Objective   Visit Vitals  /72 (BP Location: Left arm, Patient Position: Sitting)   Pulse 84   Temp 36.2 °C (97.2 °F)   Wt 62 kg (136 lb 9.6 oz)   SpO2 96%   BMI 22.05 kg/m²   Smoking Status Former   BSA 1.7 m²     Physical Exam  Constitutional: Cachexia of cancer     General: not in acute distress.   HENT:      Head: Normocephalic and atraumatic.      Nose: Nose normal.   Eyes:      Extraocular Movements: Extraocular movements intact.      Conjunctiva/sclera: Conjunctivae normal.   Cardiovascular: Heart murmur     Rate and Rhythm: Normal rate ,  No M/R/G  Pulmonary: Diminished air entry in the left lung     Effort: Pulmonary effort is normal.      Breath sounds: Normal, Bilat Equal AE  Skin: Clubbing     General: Skin is warm.   Neurological:      Mental Status: He is alert and oriented to person, place, and time.   Psychiatric:         Mood and Affect: Mood normal.         Behavior: Behavior normal.   Musculoskeletal osteopenia osteoarthritis  FROM in all extremitirs,  Joint-no swelling or tenderness    Admission on 06/03/2025, Discharged on 06/04/2025   Component Date Value Ref Range Status    WBC 06/04/2025 6.2  4.4 - 11.3 x10*3/uL Final    nRBC 06/04/2025 0.0  0.0 -  0.0 /100 WBCs Final    RBC 06/04/2025 4.98  4.50 - 5.90 x10*6/uL Final    Hemoglobin 06/04/2025 14.8  13.5 - 17.5 g/dL Final    Hematocrit 06/04/2025 45.2  41.0 - 52.0 % Final    MCV 06/04/2025 91  80 - 100 fL Final    MCH 06/04/2025 29.7  26.0 - 34.0 pg Final    MCHC 06/04/2025 32.7  32.0 - 36.0 g/dL Final    RDW 06/04/2025 13.3  11.5 - 14.5 % Final    Platelets 06/04/2025 203  150 - 450 x10*3/uL Final    Glucose 06/04/2025 92  74 - 99 mg/dL Final    Sodium 06/04/2025 143  136 - 145 mmol/L Final    Potassium 06/04/2025 4.0  3.5 - 5.3 mmol/L Final    Chloride 06/04/2025 109 (H)  98 - 107 mmol/L Final    Bicarbonate 06/04/2025 24  21 - 32 mmol/L Final    Anion Gap 06/04/2025 14  10 - 20 mmol/L Final    Urea Nitrogen 06/04/2025 19  6 - 23 mg/dL Final    Creatinine 06/04/2025 1.09  0.50 - 1.30 mg/dL Final    eGFR 06/04/2025 73  >60 mL/min/1.73m*2 Final    Calcium 06/04/2025 8.7  8.6 - 10.6 mg/dL Final    Magnesium 06/04/2025 2.18  1.60 - 2.40 mg/dL Final   Orders Only on 05/27/2025   Component Date Value Ref Range Status    [facility] Instr WBC 05/27/2025 8.1   Final    [facility] MCHC 05/27/2025 33.0  32.0 - 37.0 g/dL Final    [facility] MCV 05/27/2025 90.7  80.0 - 100.0 fL Final    [facility] MPV 05/27/2025 7.8  7.4 - 10.4 fL Final    [facility] HGB 05/27/2025 15.5  13.5 - 17.5 g/dL Final    [facility] RDW 05/27/2025 14.0  11.5 - 14.5 % Final    [facility] WBC 05/27/2025 8.1  4.5 - 11.0 x10 Final    [facility] MCH 05/27/2025 29.9  27.0 - 34.0 pg Final    [facility] Nucleated RBC 05/27/2025 0  /100WBC Final    [facility] HCT 05/27/2025 47.0  41.0 - 52.0 % Final    [facility] Platelet 05/27/2025 241  150 - 450 x10 Final    [facility] RBC 05/27/2025 5.18  4.70 - 6.10 x10 Final    [facility] DIFF? 05/27/2025 No^NO   Final    [facility] Eos Count 05/27/2025 0.27  0.00 - 0.50 x1000 Final    [facility] Lymph Count 05/27/2025 1.65  1.20 - 4.80 x1000 Final    [facility] Lymph % 05/27/2025 20.4  % Final    [facility] Laura  Count 05/27/2025 0.07  0.00 - 0.20 x1000 Final    [facility] Basos % 05/27/2025 0.8  % Final    [facility] Mono % 05/27/2025 9.1  % Final    [facility] Mono Count 05/27/2025 0.74  0.10 - 1.00 x1000 Final    [facility] Neutrophil % 05/27/2025 66.2  % Final    [facility] Neutrophil Count (ANC) 05/27/2025 5.35  1.40 - 8.80 x1000 Final    [facility] Eosin % 05/27/2025 3.4  % Final    [facility] Na 05/27/2025 141  135 - 145 mmol/L Final    [facility] Calcium 05/27/2025 9.3  8.7 - 10.4 mg/dL Final    [facility] Glomerular Filtration R* 05/27/2025 >60  mL/min/1.73m? Final    [facility] Calculated Osmolality 05/27/2025 284  275 - 295 mOsm/kg Final    [facility] Alk Phos 05/27/2025 71  45 - 117 unit/L Final    [facility] Creatinine 05/27/2025 1.0  0.6 - 1.1 mg/dL Final    [facility] Globulin 05/27/2025 3.1  g/dL Final    [facility] Chloride 05/27/2025 105  98 - 107 mmol/L Final    [facility] GOT 05/27/2025 24  15 - 37 unit/L Final    [facility] K 05/27/2025 3.9  3.5 - 5.1 mmol/L Final    [facility] BUN/Creat Ratio 05/27/2025 22.0   Final    [facility] GFR AA 05/27/2025 >60   Final    [facility] Bilirubin, Total 05/27/2025 0.50  0.30 - 1.20 mg/dL Final    [facility] BUN 05/27/2025 22  9 - 23 mg/dL Final    [facility] A/G Ratio 05/27/2025 1.1   Final    [facility] ALB 05/27/2025 3.4  3.4 - 5.0 g/dL Final    [facility] CO2, venous 05/27/2025 28.0  20.0 - 31.0 mmol/L Final    [facility] Glucose 05/27/2025 90  74 - 106 mg/dL Final    [facility] GPT 05/27/2025 60 (H)  10 - 49 unit/L Final    [facility] Total Protein 05/27/2025 6.5  5.7 - 8.2 g/dL Final    [facility] Calculated LDL Choleste* 05/27/2025 54 (L)  60 - 130 mg/dL Final    [facility] Total Chol/HDL Chol Rat* 05/27/2025 3.4   Final    [facility] Cholesterol 05/27/2025 148  100 - 200 mg/dL Final    [facility] HDL Cholesterol 05/27/2025 44  40 - 60 mg/dL Final    [facility] Triglycerides 05/27/2025 249 (H)  30 - 150 mg/dL Final       Radiology: Reviewed imaging in  powerchart.  Imaging  XR chest 1 view  Result Date: 6/5/2025  1.  There is minimal increase in left lateral/basilar pneumothorax with residual left apical component. Follow-up as clinically indicated.     Signed by: Adonis Khalil 6/5/2025 4:48 PM Dictation workstation:   [id number]    XR chest 1 view  Result Date: 6/4/2025  1.  Interval removal of chest tube with slightly decreased size of left pneumothorax.   I personally reviewed the images/study and agree with the findings as stated by Andreas Knight MD (Resident Physician). This study was interpreted at [place], [place].   MACRO: None   Signed by: Marciano Amanda 6/4/2025 11:23 AM Dictation workstation:   [id number]    XR chest 1 view  Result Date: 6/4/2025  1.  Stable moderate-size left pneumothorax and chest tube placement.   I personally reviewed the images/study and agree with the findings as stated by Andreas Knight MD (Resident Physician). This study was interpreted at [place], [place].   MACRO: None   Signed by: Marciano Amanda 6/4/2025 11:21 AM Dictation workstation:   [id number]    XR chest 1 view  Result Date: 6/3/2025  1.  Moderate-size left pneumothorax without associated mediastinal shift. Recommend follow-up radiograph.   I personally reviewed the images/study and agree with the findings as stated by Andreas Knight MD (Resident Physician). This study was interpreted at [place], [place].   MACRO: Critical Finding:  See findings. Notification was initiated on 6/3/2025 at 4:23 pm by  Andreas Knight to Yesi Collins via epic secure chat. (**-OCF-**) Instructions:   Signed by: Latanya Carrillo 6/3/2025 4:33 PM Dictation workstation:   [id number]      Cardiology, Vascular, and Other Imaging  XR chest 2 views  Result Date: 5/27/2025  Chest PA and lateral  5/27/2025 8:48 AM CDT Prior Study: 5/19/2025 History:  PNEUMOTHORAX  Tech notes: WHAT SYMPTOMS ARE YOU EXPERIENCING?; F/U FOR FLUID/LUNG CA Findings: This study was not ordered stat and was a routine case on the work list. Since the prior study, there has been marked interval enlargement of the left-sided pneumothorax. There is currently a very large left pneumothorax present. Only a small portion of the left upper lobe is aerated. There is left basilar opacification. There may be layering fluid at the left lung base. The right lung remains well aerated without definite acute infiltrate. There is no right pneumothorax. There is mild blunting of the right costophrenic angle. Heart size and mediastinal width are stable. There are atherosclerotic changes of the thoracic aorta and degenerative changes of the thoracic spine. The osseous structures are osteopenic. Results will be conveyed to the referring service Impression: Marked interval enlargement of the left-sided pneumothorax as discussed above. There is currently a very large left-sided pneumothorax. Electronically signed by:  Adonis Kaplan MD  05/31/2025 08:41 AM EDT RP Workstation: [id number]          Charting was completed using voice recognition technology and may include unintended errors.            [1]   Past Medical History:  Diagnosis Date    Benign prostatic hyperplasia without lower urinary tract symptoms 05/25/2021    Enlarged prostate without lower urinary tract symptoms (luts)    Encounter for screening for malignant neoplasm of respiratory organs 05/02/2022    Encounter for screening for lung cancer    Essential (primary) hypertension 05/13/2021    Benign essential hypertension    Generalized anxiety disorder 05/25/2021    VIVEK (generalized anxiety disorder)    Mixed hyperlipidemia 05/13/2021    Hyperlipidemia, mixed    Other abnormal glucose     Elevated glucose    Other conditions influencing health status     Postoperative seroma    Other conditions influencing health status 02/05/2018    History of cough    Other  specified soft tissue disorders 12/14/2017    Soft tissue mass    Pain in left knee 05/30/2017    Left knee pain    Personal history of benign neoplasm of the brain 05/13/2021    History of benign neoplasm of brain    Personal history of neoplasm of uncertain behavior 05/07/2018    History of neoplasm of uncertain behavior    Personal history of other diseases of the circulatory system 05/08/2019    History of hypertension    Personal history of other diseases of the digestive system 05/02/2022    History of fatty infiltration of liver    Personal history of other diseases of the musculoskeletal system and connective tissue 07/05/2022    History of polymyalgia rheumatica    Personal history of other diseases of the musculoskeletal system and connective tissue 11/04/2021    History of gout    Personal history of other diseases of the respiratory system 05/02/2022    History of sinusitis    Personal history of other diseases of the respiratory system 02/05/2018    History of upper respiratory infection    Personal history of other endocrine, nutritional and metabolic disease 11/04/2021    History of diabetes mellitus    Personal history of other endocrine, nutritional and metabolic disease 05/08/2019    History of hyperlipidemia    Personal history of other endocrine, nutritional and metabolic disease 05/25/2021    History of vitamin D deficiency    Personal history of other specified conditions 02/13/2018    History of ataxia    Personal history of other specified conditions 07/22/2016    History of urinary frequency    Personal history of other specified conditions 07/22/2016    History of fever    Personal history of other specified conditions 11/20/2017    History of bradycardia    Vitamin D deficiency, unspecified 09/08/2015    Vitamin D deficiency   [2]   Past Surgical History:  Procedure Laterality Date    MANDIBLE SURGERY  11/01/2017    Jaw Surgery    OTHER SURGICAL HISTORY  07/22/2016    Brain Surgery   [3]    Allergies  Allergen Reactions    Levonorgestrel-Ethinyl Estrad Unknown    Pollen Extracts Unknown   [4]   Current Outpatient Medications   Medication Sig Dispense Refill    acetaminophen (Tylenol) 325 mg tablet Take 2 tablets (650 mg) by mouth every 6 hours if needed for mild pain (1 - 3).      allopurinol (Zyloprim) 300 mg tablet Take 1 tablet (300 mg) by mouth once daily. 90 tablet 3    docusate sodium (Colace) 100 mg capsule Take 1 capsule (100 mg) by mouth 2 times a day.      fluticasone-umeclidin-vilanter (Trelegy Ellipta) 100-62.5-25 mcg blister with device Inhale 1 puff once daily. 60 each 3    gabapentin (Neurontin) 100 mg capsule Take 1 capsule (100 mg) by mouth 2 times a day. 60 capsule 2    mv-min/folic/K1/lycopen/lutein (MEN 50 PLUS MULTIVITAMIN ORAL) Take 1 tablet by mouth once daily.      olmesartan (BENIcar) 40 mg tablet Take 1 tablet (40 mg) by mouth once daily. 90 tablet 3    osimertinib (Tagrisso) 80 mg tablet Take 1 tablet (80 mg total) by mouth once daily.      PARoxetine (Paxil) 20 mg tablet Take 1 tablet (20 mg) by mouth once daily in the morning. Take before meals. 90 tablet 3    Protonix 20 mg EC tablet Take 1 tablet (20 mg) by mouth once daily.      UNABLE TO FIND Take 1 capsule by mouth once daily. Med Name: Prevagen       No current facility-administered medications for this visit.   [5]   Family History  Problem Relation Name Age of Onset    Stomach cancer Mother      Stomach cancer Sister      Brain cancer Son     [6]   Social History  Socioeconomic History    Marital status:    Tobacco Use    Smoking status: Former     Types: Cigarettes    Smokeless tobacco: Never   Substance and Sexual Activity    Alcohol use: Yes     Comment: occasional    Drug use: Never     Social Drivers of Health     Financial Resource Strain: Low Risk  (6/3/2025)    Overall Financial Resource Strain (CARDIA)     Difficulty of Paying Living Expenses: Not hard at all   Food Insecurity: No Food Insecurity  (6/3/2025)    Hunger Vital Sign     Worried About Running Out of Food in the Last Year: Never true     Ran Out of Food in the Last Year: Never true   Transportation Needs: No Transportation Needs (6/3/2025)    PRAPARE - Transportation     Lack of Transportation (Medical): No     Lack of Transportation (Non-Medical): No   Intimate Partner Violence: Not At Risk (6/3/2025)    Humiliation, Afraid, Rape, and Kick questionnaire     Fear of Current or Ex-Partner: No     Emotionally Abused: No     Physically Abused: No     Sexually Abused: No   Housing Stability: Low Risk  (6/3/2025)    Housing Stability Vital Sign     Unable to Pay for Housing in the Last Year: No     Number of Times Moved in the Last Year: 0     Homeless in the Last Year: No

## 2025-06-06 NOTE — ASSESSMENT & PLAN NOTE
Hypertension hyperlipidemia advise continue Benicar 40 mg a day Lipitor 10 mg a day for the CMP CPK lipid twice a year

## 2025-06-06 NOTE — PROGRESS NOTES
Confirmation of at least 2 patient identifiers.    Completed telephonic follow-up with patient after recent visit with Lin Barbosa MD      Spoke to patient during outreach call.    Patient reports feeling: Improved    Patient has questions or concerns about medications: No    Have all prescribed medications been filled? Yes    Patient has necessary resources to manage their care? Yes    Patient has questions or concerns? No    Next care management follow-up approximately within one month.  Care  information provided to patient.    Bebeto Donnelly LPN

## 2025-06-09 ENCOUNTER — TELEPHONE (OUTPATIENT)
Dept: PRIMARY CARE | Facility: CLINIC | Age: 70
End: 2025-06-09
Payer: MEDICARE

## 2025-06-09 LAB
NON-UH HIE ALLEN TEST: ABNORMAL
NON-UH HIE BASE EXCESS: 0.9 MMOL/L
NON-UH HIE EPAP: 0 CMH20
NON-UH HIE FIO2: 21 %
NON-UH HIE HCO3: 23.9 MMOL/L (ref 22–26)
NON-UH HIE IPAP: 0 CMH20
NON-UH HIE O2 L/M: 0
NON-UH HIE O2 SATURATION: 93.3 %
NON-UH HIE PCO2: 33.8 MMHG (ref 35–45)
NON-UH HIE PEEP: 0 CMH20
NON-UH HIE PH: 7.47 (ref 7.35–7.45)
NON-UH HIE PO2/FIO2 RATIO: 322 (ref 300–500)
NON-UH HIE PO2: 67.6 MMHG (ref 80–100)
NON-UH HIE PRESSURE SUPPORT.: 0 CMH20
NON-UH HIE RATE: 0 BPM
NON-UH HIE TEMP: 37 DEGC
NON-UH HIE TYPE OF SPECIMEN: ABNORMAL
NON-UH HIE VENTILATION: ABNORMAL
NON-UH HIE VT: 0 ML

## 2025-06-09 NOTE — TELEPHONE ENCOUNTER
----- Message from Lin Barbosa sent at 6/9/2025  9:26 AM EDT -----  Left pneumothorax fax this report to Dr. Quiroz and follow-up with Dr. Quiroz pulmonary  ----- Message -----  From: Interface, [employer] - Imaging Results In  Sent: 6/7/2025   9:58 AM EDT  To: Lin Barbosa MD

## 2025-06-09 NOTE — TELEPHONE ENCOUNTER
I was talking to patient in regards to results and the phone cut off. I will try to call again later.

## 2025-06-13 NOTE — PROGRESS NOTES
"Erica Romano  is a 69 y.o. male who presents for hospital follow-up for recurrent left pneumothorax.    This patient has a history of metastatic non-small cell lung cancer to the left lung.  He developed a pneumothorax in August 2024.  More recently, he developed a pneumothorax, received a chest tube for management, and was transferred to [facility] for evaluation and management.  We did not appreciate an air leak and recommended chest tube removal.  The chest tube was removed on 6/4/2025, and he was discharged.  He presents today for postoperative follow-up.    Currently the patient is in their usual state of health. He denies the following symptoms: chest pain, shortness of breath at rest, shortness of breath with activity, cough, hemoptysis, fevers, chills, and weight loss. He is active and \"Exercises a lot\".     There have been no significant changes to their documented medical, surgical and family history.     He  reports that he has quit smoking. His smoking use included cigarettes. He has never used smokeless tobacco. He reports current alcohol use. He reports that he does not use drugs.    Objective   Physical Exam  The patient is well-appearing and in no acute distress. The trachea is midline and there is no crepitus. The lungs were clear to auscultation grossly. There was good effort and excursion. The heart had a regular rate and rhythm. The abdomen was soft, nontender and nondistended. The extremities had no edema or gross deformities. Mood and affect are appropriate.  Diagnostic Studies    I reviewed the chest x-ray which shows a stable left pneumothorax    Assessment/Plan   I believe that the patient is doing well.     This patient is currently clinically well and radiographic imaging suggest his pneumothorax is stable.  Etiology of this pneumothorax is somewhat unclear to me.  The reports from his other hospitalization suggest that this was increasing in size, but to me " "this looks like a chronic pneumothorax, potentially an \"ex vacuo\" pneumothorax from a prior chronic pleural effusion.    Because the patient is currently clinically well, I believe observation is reasonable.  I would like to speak to him again in 4 weeks with a chest x-ray.  If the pneumothorax is increasing in size, or he is symptomatic, we could consider surgical intervention.  Until then I recommended refraining from strenuous activities and suggested that he seek medical attention for worsening shortness of breath chest pain, or increasing cough.    I recommend CXR  and phone call in 4 wks    I discussed this in detail with the patient, including a discussion of alternatives. They were comfortable with this approach.     Sagar Grissom MD  [phone]    "

## 2025-06-18 ENCOUNTER — HOSPITAL ENCOUNTER (OUTPATIENT)
Dept: RADIOLOGY | Facility: HOSPITAL | Age: 70
Discharge: HOME | End: 2025-06-18
Payer: MEDICARE

## 2025-06-18 ENCOUNTER — OFFICE VISIT (OUTPATIENT)
Dept: SURGERY | Facility: CLINIC | Age: 70
End: 2025-06-18
Payer: MEDICARE

## 2025-06-18 VITALS
WEIGHT: 135.2 LBS | HEART RATE: 85 BPM | DIASTOLIC BLOOD PRESSURE: 85 MMHG | OXYGEN SATURATION: 96 % | SYSTOLIC BLOOD PRESSURE: 134 MMHG | TEMPERATURE: 97.2 F | HEIGHT: 66 IN | BODY MASS INDEX: 21.73 KG/M2

## 2025-06-18 DIAGNOSIS — J93.9 PNEUMOTHORAX ON LEFT: ICD-10-CM

## 2025-06-18 PROCEDURE — 1126F AMNT PAIN NOTED NONE PRSNT: CPT | Performed by: THORACIC SURGERY (CARDIOTHORACIC VASCULAR SURGERY)

## 2025-06-18 PROCEDURE — 3079F DIAST BP 80-89 MM HG: CPT | Performed by: THORACIC SURGERY (CARDIOTHORACIC VASCULAR SURGERY)

## 2025-06-18 PROCEDURE — 3075F SYST BP GE 130 - 139MM HG: CPT | Performed by: THORACIC SURGERY (CARDIOTHORACIC VASCULAR SURGERY)

## 2025-06-18 PROCEDURE — 99215 OFFICE O/P EST HI 40 MIN: CPT | Performed by: THORACIC SURGERY (CARDIOTHORACIC VASCULAR SURGERY)

## 2025-06-18 PROCEDURE — 1036F TOBACCO NON-USER: CPT | Performed by: THORACIC SURGERY (CARDIOTHORACIC VASCULAR SURGERY)

## 2025-06-18 PROCEDURE — 3008F BODY MASS INDEX DOCD: CPT | Performed by: THORACIC SURGERY (CARDIOTHORACIC VASCULAR SURGERY)

## 2025-06-18 PROCEDURE — 71046 X-RAY EXAM CHEST 2 VIEWS: CPT

## 2025-06-18 PROCEDURE — 71046 X-RAY EXAM CHEST 2 VIEWS: CPT | Performed by: RADIOLOGY

## 2025-06-18 PROCEDURE — 1111F DSCHRG MED/CURRENT MED MERGE: CPT | Performed by: THORACIC SURGERY (CARDIOTHORACIC VASCULAR SURGERY)

## 2025-06-18 ASSESSMENT — ENCOUNTER SYMPTOMS
OCCASIONAL FEELINGS OF UNSTEADINESS: 1
DEPRESSION: 0
LOSS OF SENSATION IN FEET: 0

## 2025-06-18 ASSESSMENT — PAIN SCALES - GENERAL: PAINLEVEL_OUTOF10: 0-NO PAIN

## 2025-06-27 PROBLEM — E78.5 HYPERLIPIDEMIA: Status: ACTIVE | Noted: 2025-06-27

## 2025-06-27 PROBLEM — S22.42XA CLOSED TRAUMATIC FRACTURE OF RIBS OF LEFT SIDE WITH PNEUMOTHORAX: Status: ACTIVE | Noted: 2025-06-27

## 2025-06-27 PROBLEM — S27.0XXA CLOSED TRAUMATIC FRACTURE OF RIBS OF LEFT SIDE WITH PNEUMOTHORAX: Status: ACTIVE | Noted: 2025-06-27

## 2025-06-27 LAB
BASOPHILS # BLD AUTO: 50 CELLS/UL (ref 0–200)
BASOPHILS NFR BLD AUTO: 1 %
CHOLEST SERPL-MCNC: 192 MG/DL
CHOLEST/HDLC SERPL: 3.7 (CALC)
EOSINOPHIL # BLD AUTO: 310 CELLS/UL (ref 15–500)
EOSINOPHIL NFR BLD AUTO: 6.2 %
ERYTHROCYTE [DISTWIDTH] IN BLOOD BY AUTOMATED COUNT: 13.6 % (ref 11–15)
EST. AVERAGE GLUCOSE BLD GHB EST-MCNC: 134 MG/DL
EST. AVERAGE GLUCOSE BLD GHB EST-SCNC: 7.4 MMOL/L
HBA1C MFR BLD: 6.3 %
HCT VFR BLD AUTO: 46.2 % (ref 38.5–50)
HDLC SERPL-MCNC: 52 MG/DL
HGB BLD-MCNC: 14.9 G/DL (ref 13.2–17.1)
LDLC SERPL CALC-MCNC: 118 MG/DL (CALC)
LYMPHOCYTES # BLD AUTO: 1560 CELLS/UL (ref 850–3900)
LYMPHOCYTES NFR BLD AUTO: 31.2 %
MCH RBC QN AUTO: 29.6 PG (ref 27–33)
MCHC RBC AUTO-ENTMCNC: 32.3 G/DL (ref 32–36)
MCV RBC AUTO: 91.7 FL (ref 80–100)
MONOCYTES # BLD AUTO: 390 CELLS/UL (ref 200–950)
MONOCYTES NFR BLD AUTO: 7.8 %
NEUTROPHILS # BLD AUTO: 2690 CELLS/UL (ref 1500–7800)
NEUTROPHILS NFR BLD AUTO: 53.8 %
NONHDLC SERPL-MCNC: 140 MG/DL (CALC)
PLATELET # BLD AUTO: 217 THOUSAND/UL (ref 140–400)
PMV BLD REES-ECKER: 9.7 FL (ref 7.5–12.5)
RBC # BLD AUTO: 5.04 MILLION/UL (ref 4.2–5.8)
TRIGL SERPL-MCNC: 109 MG/DL
WBC # BLD AUTO: 5 THOUSAND/UL (ref 3.8–10.8)

## 2025-07-01 ENCOUNTER — TELEPHONE (OUTPATIENT)
Dept: PRIMARY CARE | Facility: CLINIC | Age: 70
End: 2025-07-01
Payer: MEDICARE

## 2025-07-01 DIAGNOSIS — E78.5 HYPERLIPIDEMIA, UNSPECIFIED HYPERLIPIDEMIA TYPE: ICD-10-CM

## 2025-07-01 NOTE — TELEPHONE ENCOUNTER
----- Message from Donnie Lacy sent at 7/1/2025 12:47 PM EDT -----  Patient still has high LDL.  ASCVD risk is very high patient can consider low-carb diet exercise but ideally patient needs to remain low-dose statin can start with rosuvastatin 5 mg daily.  Monitor   for myalgia stay hydrated while taking medication.  ----- Message -----  From: [employer] Direct Results In  Sent: 6/26/2025  10:16 PM EDT  To: Lin Barbosa MD

## 2025-07-03 ENCOUNTER — PATIENT OUTREACH (OUTPATIENT)
Dept: PRIMARY CARE | Facility: CLINIC | Age: 70
End: 2025-07-03
Payer: MEDICARE

## 2025-07-03 RX ORDER — ROSUVASTATIN CALCIUM 5 MG/1
5 TABLET, COATED ORAL DAILY
Qty: 90 TABLET | Refills: 3 | Status: SHIPPED | OUTPATIENT
Start: 2025-07-03

## 2025-07-03 NOTE — TELEPHONE ENCOUNTER
----- Message from Donnie Lacy sent at 7/1/2025 12:47 PM EDT -----  Patient still has high LDL.  ASCVD risk is very high patient can consider low-carb diet exercise but ideally patient needs to remain low-dose statin can start with rosuvastatin 5 mg daily.  Monitor   for myalgia stay hydrated while taking medication.  ----- Message -----  From: [employer] Results In  Sent: 6/26/2025  10:16 PM EDT  To: Lin Barbosa MD

## 2025-07-03 NOTE — TELEPHONE ENCOUNTER
----- Message from Donnie Lacy sent at 7/1/2025 12:47 PM EDT -----  Patient still has high LDL.  ASCVD risk is very high patient can consider low-carb diet exercise but ideally patient needs to remain low-dose statin can start with rosuvastatin 5 mg daily.  Monitor   for myalgia stay hydrated while taking medication.  ----- Message -----  From: [employer] Results In  Sent: 6/26/2025  10:16 PM EDT  To: Lin Barbosa MD

## 2025-07-15 NOTE — PROGRESS NOTES
Subjective   Fortunato Romano  is a 69 y.o. male who presents for hospital follow-up for recurrent left pneumothorax.     This patient has a history of metastatic non-small cell lung cancer to the left lung.  He developed a pneumothorax in August 2024.  More recently, he developed a pneumothorax, received a chest tube for management, and was transferred to [facility] for evaluation and management.  We did not appreciate an air leak and recommended chest tube removal.  The chest tube was removed on 6/4/2025, and he was discharged.  I felt his pneumothorax was related to trapped lung in the setting of malignancy.    Currently the patient is noting difficulty breathing when he walks more than 15 feet. This is worse than it was a month ago . He denies the following symptoms: chest pain, cough, changes to mental status, and dizziness.      There have been no significant changes to their documented medical, surgical and family history.     He  reports that he has quit smoking. His smoking use included cigarettes. He has never used smokeless tobacco. He reports current alcohol use. He reports that he does not use drugs.    Objective   Physical Exam  Phone visit (audio only evaluation - patient declined request to have a video visit)   Diagnostic Studies  I reviewed a chest x-ray from today    Assessment/Plan   I believe that the patient has a diagnosis of left pneumothorax.     This patient has a subtle but significant increase in his left pneumothorax which is associated with symptoms of shortness of breath.  Currently, he has no signs of clinical decompensation or tension physiology.  I believe it is likely related to chronic changes in the setting of cancer, but the increased symptoms and increasing size to me suggest that intervention is warranted.  We discussed the possibility of being seen in the emergency department today and receiving a chest tube versus operative intervention first thing tomorrow.  I  believe operative intervention is a reasonable and likely more effective approach.  The patient was comfortable with this.  I am comfortable with an elective approach given his relatively mild symptoms.  I advised him to present to the ER if he had any worsening symptoms or significant difficulty breathing.  He agreed to this.  We discussed the surgery in detail. I carefully described the risks, benefits, and alternatives to surgical intervention. We discussed the possibility of serious complications including death. I answered any questions they expressed. After this, the patient agreed to proceed with surgery    I recommend surgery. Left vats exploration, possible pleurx cathter    I discussed this in detail with the patient, including a discussion of alternatives. They were comfortable with this approach.     Sagar Grissom MD  [phone]  They declined video call, consent obtained, Time: 16  min.

## 2025-07-17 ENCOUNTER — HOSPITAL ENCOUNTER (OUTPATIENT)
Dept: RADIOLOGY | Facility: HOSPITAL | Age: 70
Discharge: HOME | End: 2025-07-17
Payer: MEDICARE

## 2025-07-17 ENCOUNTER — TELEMEDICINE (OUTPATIENT)
Dept: SURGERY | Facility: HOSPITAL | Age: 70
End: 2025-07-17
Payer: MEDICARE

## 2025-07-17 ENCOUNTER — HOSPITAL ENCOUNTER (OUTPATIENT)
Facility: HOSPITAL | Age: 70
Setting detail: OUTPATIENT SURGERY
End: 2025-07-17
Attending: THORACIC SURGERY (CARDIOTHORACIC VASCULAR SURGERY) | Admitting: THORACIC SURGERY (CARDIOTHORACIC VASCULAR SURGERY)
Payer: MEDICARE

## 2025-07-17 ENCOUNTER — HOSPITAL ENCOUNTER (INPATIENT)
Facility: HOSPITAL | Age: 70
DRG: 164 | End: 2025-07-17
Attending: EMERGENCY MEDICINE | Admitting: THORACIC SURGERY (CARDIOTHORACIC VASCULAR SURGERY)
Payer: MEDICARE

## 2025-07-17 DIAGNOSIS — J93.9 PNEUMOTHORAX: ICD-10-CM

## 2025-07-17 DIAGNOSIS — J93.9 PNEUMOTHORAX ON LEFT: Primary | ICD-10-CM

## 2025-07-17 DIAGNOSIS — J93.9 PNEUMOTHORAX ON LEFT: ICD-10-CM

## 2025-07-17 LAB
ABO GROUP (TYPE) IN BLOOD: NORMAL
ALBUMIN SERPL BCP-MCNC: 4.3 G/DL (ref 3.4–5)
ANION GAP SERPL CALC-SCNC: 14 MMOL/L (ref 10–20)
ANTIBODY SCREEN: NORMAL
APTT PPP: 31 SECONDS (ref 26–36)
BUN SERPL-MCNC: 18 MG/DL (ref 6–23)
CALCIUM SERPL-MCNC: 9.1 MG/DL (ref 8.6–10.6)
CHLORIDE SERPL-SCNC: 108 MMOL/L (ref 98–107)
CO2 SERPL-SCNC: 23 MMOL/L (ref 21–32)
CREAT SERPL-MCNC: 1.04 MG/DL (ref 0.5–1.3)
EGFRCR SERPLBLD CKD-EPI 2021: 77 ML/MIN/1.73M*2
ERYTHROCYTE [DISTWIDTH] IN BLOOD BY AUTOMATED COUNT: 13.3 % (ref 11.5–14.5)
GLUCOSE SERPL-MCNC: 117 MG/DL (ref 74–99)
HCT VFR BLD AUTO: 43.9 % (ref 41–52)
HGB BLD-MCNC: 14.9 G/DL (ref 13.5–17.5)
INR PPP: 1 (ref 0.9–1.1)
MAGNESIUM SERPL-MCNC: 2.39 MG/DL (ref 1.6–2.4)
MCH RBC QN AUTO: 29.3 PG (ref 26–34)
MCHC RBC AUTO-ENTMCNC: 33.9 G/DL (ref 32–36)
MCV RBC AUTO: 86 FL (ref 80–100)
NRBC BLD-RTO: 0 /100 WBCS (ref 0–0)
PHOSPHATE SERPL-MCNC: 3.1 MG/DL (ref 2.5–4.9)
PLATELET # BLD AUTO: 200 X10*3/UL (ref 150–450)
POTASSIUM SERPL-SCNC: 4.1 MMOL/L (ref 3.5–5.3)
PROTHROMBIN TIME: 10.9 SECONDS (ref 9.8–12.4)
RBC # BLD AUTO: 5.08 X10*6/UL (ref 4.5–5.9)
RH FACTOR (ANTIGEN D): NORMAL
SODIUM SERPL-SCNC: 141 MMOL/L (ref 136–145)
WBC # BLD AUTO: 5.6 X10*3/UL (ref 4.4–11.3)

## 2025-07-17 PROCEDURE — 99285 EMERGENCY DEPT VISIT HI MDM: CPT | Performed by: EMERGENCY MEDICINE

## 2025-07-17 PROCEDURE — 99212 OFFICE O/P EST SF 10 MIN: CPT | Performed by: THORACIC SURGERY (CARDIOTHORACIC VASCULAR SURGERY)

## 2025-07-17 PROCEDURE — 2500000004 HC RX 250 GENERAL PHARMACY W/ HCPCS (ALT 636 FOR OP/ED)

## 2025-07-17 PROCEDURE — 2500000001 HC RX 250 WO HCPCS SELF ADMINISTERED DRUGS (ALT 637 FOR MEDICARE OP)

## 2025-07-17 PROCEDURE — 83735 ASSAY OF MAGNESIUM: CPT

## 2025-07-17 PROCEDURE — 85027 COMPLETE CBC AUTOMATED: CPT

## 2025-07-17 PROCEDURE — 1210000001 HC SEMI-PRIVATE ROOM DAILY

## 2025-07-17 PROCEDURE — 85730 THROMBOPLASTIN TIME PARTIAL: CPT

## 2025-07-17 PROCEDURE — 71046 X-RAY EXAM CHEST 2 VIEWS: CPT | Performed by: STUDENT IN AN ORGANIZED HEALTH CARE EDUCATION/TRAINING PROGRAM

## 2025-07-17 PROCEDURE — 71046 X-RAY EXAM CHEST 2 VIEWS: CPT

## 2025-07-17 PROCEDURE — 36415 COLL VENOUS BLD VENIPUNCTURE: CPT

## 2025-07-17 PROCEDURE — 86901 BLOOD TYPING SEROLOGIC RH(D): CPT

## 2025-07-17 PROCEDURE — 80069 RENAL FUNCTION PANEL: CPT

## 2025-07-17 RX ORDER — ACETAMINOPHEN 325 MG/1
650 TABLET ORAL EVERY 6 HOURS PRN
Status: DISCONTINUED | OUTPATIENT
Start: 2025-07-17 | End: 2025-07-18

## 2025-07-17 RX ORDER — ROSUVASTATIN CALCIUM 5 MG/1
5 TABLET, COATED ORAL DAILY
Status: DISCONTINUED | OUTPATIENT
Start: 2025-07-18 | End: 2025-07-21 | Stop reason: HOSPADM

## 2025-07-17 RX ORDER — ENOXAPARIN SODIUM 100 MG/ML
40 INJECTION SUBCUTANEOUS ONCE
Status: DISCONTINUED | OUTPATIENT
Start: 2025-07-17 | End: 2025-07-17

## 2025-07-17 RX ORDER — PANTOPRAZOLE SODIUM 20 MG/1
20 TABLET, DELAYED RELEASE ORAL DAILY
Status: DISCONTINUED | OUTPATIENT
Start: 2025-07-18 | End: 2025-07-21 | Stop reason: HOSPADM

## 2025-07-17 RX ORDER — ENOXAPARIN SODIUM 100 MG/ML
40 INJECTION SUBCUTANEOUS EVERY 24 HOURS
Status: DISCONTINUED | OUTPATIENT
Start: 2025-07-17 | End: 2025-07-18

## 2025-07-17 RX ORDER — GABAPENTIN 100 MG/1
100 CAPSULE ORAL 2 TIMES DAILY
Status: DISCONTINUED | OUTPATIENT
Start: 2025-07-17 | End: 2025-07-21 | Stop reason: HOSPADM

## 2025-07-17 RX ORDER — FLUTICASONE FUROATE AND VILANTEROL 100; 25 UG/1; UG/1
1 POWDER RESPIRATORY (INHALATION)
Status: DISCONTINUED | OUTPATIENT
Start: 2025-07-18 | End: 2025-07-21 | Stop reason: HOSPADM

## 2025-07-17 RX ORDER — SODIUM CHLORIDE, SODIUM LACTATE, POTASSIUM CHLORIDE, CALCIUM CHLORIDE 600; 310; 30; 20 MG/100ML; MG/100ML; MG/100ML; MG/100ML
100 INJECTION, SOLUTION INTRAVENOUS CONTINUOUS
Status: ACTIVE | OUTPATIENT
Start: 2025-07-17 | End: 2025-07-18

## 2025-07-17 RX ORDER — PAROXETINE 20 MG/1
20 TABLET, FILM COATED ORAL
Status: DISCONTINUED | OUTPATIENT
Start: 2025-07-18 | End: 2025-07-21 | Stop reason: HOSPADM

## 2025-07-17 RX ORDER — DOCUSATE SODIUM 100 MG/1
100 CAPSULE, LIQUID FILLED ORAL 2 TIMES DAILY
Status: DISCONTINUED | OUTPATIENT
Start: 2025-07-17 | End: 2025-07-21 | Stop reason: HOSPADM

## 2025-07-17 RX ORDER — ACETAMINOPHEN 325 MG/1
650 TABLET ORAL ONCE
Status: CANCELLED | OUTPATIENT
Start: 2025-07-17 | End: 2025-07-17

## 2025-07-17 RX ORDER — ALLOPURINOL 300 MG/1
300 TABLET ORAL DAILY
Status: DISCONTINUED | OUTPATIENT
Start: 2025-07-18 | End: 2025-07-21 | Stop reason: HOSPADM

## 2025-07-17 RX ORDER — HEPARIN SODIUM 5000 [USP'U]/ML
5000 INJECTION, SOLUTION INTRAVENOUS; SUBCUTANEOUS ONCE
Status: CANCELLED | OUTPATIENT
Start: 2025-07-17 | End: 2025-07-17

## 2025-07-17 RX ORDER — GABAPENTIN 300 MG/1
300 CAPSULE ORAL ONCE
Status: CANCELLED | OUTPATIENT
Start: 2025-07-17 | End: 2025-07-17

## 2025-07-17 RX ADMIN — GABAPENTIN 100 MG: 100 CAPSULE ORAL at 22:13

## 2025-07-17 RX ADMIN — ENOXAPARIN SODIUM 40 MG: 40 INJECTION, SOLUTION SUBCUTANEOUS at 23:02

## 2025-07-17 RX ADMIN — SODIUM CHLORIDE, POTASSIUM CHLORIDE, SODIUM LACTATE AND CALCIUM CHLORIDE 100 ML/HR: 600; 310; 30; 20 INJECTION, SOLUTION INTRAVENOUS at 22:13

## 2025-07-18 ENCOUNTER — APPOINTMENT (OUTPATIENT)
Dept: RADIOLOGY | Facility: HOSPITAL | Age: 70
DRG: 164 | End: 2025-07-18
Payer: MEDICARE

## 2025-07-18 ENCOUNTER — ANESTHESIA (OUTPATIENT)
Dept: OPERATING ROOM | Facility: HOSPITAL | Age: 70
End: 2025-07-18
Payer: MEDICARE

## 2025-07-18 ENCOUNTER — ANESTHESIA EVENT (OUTPATIENT)
Dept: OPERATING ROOM | Facility: HOSPITAL | Age: 70
End: 2025-07-18
Payer: MEDICARE

## 2025-07-18 LAB
ANION GAP BLDA CALCULATED.4IONS-SCNC: 5 MMO/L (ref 10–25)
BASE EXCESS BLDA CALC-SCNC: -0.2 MMOL/L (ref -2–3)
BODY TEMPERATURE: 37 DEGREES CELSIUS
CA-I BLDA-SCNC: 1.11 MMOL/L (ref 1.1–1.33)
CHLORIDE BLDA-SCNC: 109 MMOL/L (ref 98–107)
GLUCOSE BLD MANUAL STRIP-MCNC: 173 MG/DL (ref 74–99)
GLUCOSE BLD MANUAL STRIP-MCNC: 88 MG/DL (ref 74–99)
GLUCOSE BLDA-MCNC: 120 MG/DL (ref 74–99)
HCO3 BLDA-SCNC: 26.4 MMOL/L (ref 22–26)
HCT VFR BLD EST: 38 % (ref 41–52)
HGB BLDA-MCNC: 12.6 G/DL (ref 13.5–17.5)
INHALED O2 CONCENTRATION: 100 %
LACTATE BLDA-SCNC: 1.3 MMOL/L (ref 0.4–2)
OXYHGB MFR BLDA: 97.8 % (ref 94–98)
PCO2 BLDA: 50 MM HG (ref 38–42)
PH BLDA: 7.33 PH (ref 7.38–7.42)
PO2 BLDA: 272 MM HG (ref 85–95)
POTASSIUM BLDA-SCNC: 4.3 MMOL/L (ref 3.5–5.3)
SAO2 % BLDA: 100 % (ref 94–100)
SODIUM BLDA-SCNC: 136 MMOL/L (ref 136–145)

## 2025-07-18 PROCEDURE — 3600000009 HC OR TIME - EACH INCREMENTAL 1 MINUTE - PROCEDURE LEVEL FOUR: Performed by: THORACIC SURGERY (CARDIOTHORACIC VASCULAR SURGERY)

## 2025-07-18 PROCEDURE — 0BJ08ZZ INSPECTION OF TRACHEOBRONCHIAL TREE, VIA NATURAL OR ARTIFICIAL OPENING ENDOSCOPIC: ICD-10-PCS | Performed by: THORACIC SURGERY (CARDIOTHORACIC VASCULAR SURGERY)

## 2025-07-18 PROCEDURE — 2500000004 HC RX 250 GENERAL PHARMACY W/ HCPCS (ALT 636 FOR OP/ED): Performed by: STUDENT IN AN ORGANIZED HEALTH CARE EDUCATION/TRAINING PROGRAM

## 2025-07-18 PROCEDURE — 7100000001 HC RECOVERY ROOM TIME - INITIAL BASE CHARGE: Performed by: THORACIC SURGERY (CARDIOTHORACIC VASCULAR SURGERY)

## 2025-07-18 PROCEDURE — 0W9B40Z DRAINAGE OF LEFT PLEURAL CAVITY WITH DRAINAGE DEVICE, PERCUTANEOUS ENDOSCOPIC APPROACH: ICD-10-PCS | Performed by: THORACIC SURGERY (CARDIOTHORACIC VASCULAR SURGERY)

## 2025-07-18 PROCEDURE — 1200000002 HC GENERAL ROOM WITH TELEMETRY DAILY

## 2025-07-18 PROCEDURE — 88341 IMHCHEM/IMCYTCHM EA ADD ANTB: CPT | Performed by: STUDENT IN AN ORGANIZED HEALTH CARE EDUCATION/TRAINING PROGRAM

## 2025-07-18 PROCEDURE — 84132 ASSAY OF SERUM POTASSIUM: CPT | Performed by: STUDENT IN AN ORGANIZED HEALTH CARE EDUCATION/TRAINING PROGRAM

## 2025-07-18 PROCEDURE — 32656 THORACOSCOPY W/PLEURECTOMY: CPT | Performed by: THORACIC SURGERY (CARDIOTHORACIC VASCULAR SURGERY)

## 2025-07-18 PROCEDURE — 88305 TISSUE EXAM BY PATHOLOGIST: CPT | Performed by: STUDENT IN AN ORGANIZED HEALTH CARE EDUCATION/TRAINING PROGRAM

## 2025-07-18 PROCEDURE — 2500000004 HC RX 250 GENERAL PHARMACY W/ HCPCS (ALT 636 FOR OP/ED): Performed by: THORACIC SURGERY (CARDIOTHORACIC VASCULAR SURGERY)

## 2025-07-18 PROCEDURE — 88342 IMHCHEM/IMCYTCHM 1ST ANTB: CPT | Performed by: STUDENT IN AN ORGANIZED HEALTH CARE EDUCATION/TRAINING PROGRAM

## 2025-07-18 PROCEDURE — 7100000002 HC RECOVERY ROOM TIME - EACH INCREMENTAL 1 MINUTE: Performed by: THORACIC SURGERY (CARDIOTHORACIC VASCULAR SURGERY)

## 2025-07-18 PROCEDURE — 0BBP4ZZ EXCISION OF LEFT PLEURA, PERCUTANEOUS ENDOSCOPIC APPROACH: ICD-10-PCS | Performed by: THORACIC SURGERY (CARDIOTHORACIC VASCULAR SURGERY)

## 2025-07-18 PROCEDURE — 2500000005 HC RX 250 GENERAL PHARMACY W/O HCPCS: Performed by: STUDENT IN AN ORGANIZED HEALTH CARE EDUCATION/TRAINING PROGRAM

## 2025-07-18 PROCEDURE — 2500000001 HC RX 250 WO HCPCS SELF ADMINISTERED DRUGS (ALT 637 FOR MEDICARE OP): Performed by: STUDENT IN AN ORGANIZED HEALTH CARE EDUCATION/TRAINING PROGRAM

## 2025-07-18 PROCEDURE — 3700000002 HC GENERAL ANESTHESIA TIME - EACH INCREMENTAL 1 MINUTE: Performed by: THORACIC SURGERY (CARDIOTHORACIC VASCULAR SURGERY)

## 2025-07-18 PROCEDURE — 31622 DX BRONCHOSCOPE/WASH: CPT | Performed by: THORACIC SURGERY (CARDIOTHORACIC VASCULAR SURGERY)

## 2025-07-18 PROCEDURE — 71045 X-RAY EXAM CHEST 1 VIEW: CPT

## 2025-07-18 PROCEDURE — 71045 X-RAY EXAM CHEST 1 VIEW: CPT | Performed by: RADIOLOGY

## 2025-07-18 PROCEDURE — 2500000005 HC RX 250 GENERAL PHARMACY W/O HCPCS: Performed by: THORACIC SURGERY (CARDIOTHORACIC VASCULAR SURGERY)

## 2025-07-18 PROCEDURE — 82947 ASSAY GLUCOSE BLOOD QUANT: CPT

## 2025-07-18 PROCEDURE — 3700000001 HC GENERAL ANESTHESIA TIME - INITIAL BASE CHARGE: Performed by: THORACIC SURGERY (CARDIOTHORACIC VASCULAR SURGERY)

## 2025-07-18 PROCEDURE — 2720000007 HC OR 272 NO HCPCS: Performed by: THORACIC SURGERY (CARDIOTHORACIC VASCULAR SURGERY)

## 2025-07-18 PROCEDURE — 2500000001 HC RX 250 WO HCPCS SELF ADMINISTERED DRUGS (ALT 637 FOR MEDICARE OP): Performed by: THORACIC SURGERY (CARDIOTHORACIC VASCULAR SURGERY)

## 2025-07-18 PROCEDURE — 3600000004 HC OR TIME - INITIAL BASE CHARGE - PROCEDURE LEVEL FOUR: Performed by: THORACIC SURGERY (CARDIOTHORACIC VASCULAR SURGERY)

## 2025-07-18 PROCEDURE — 88305 TISSUE EXAM BY PATHOLOGIST: CPT | Mod: TC,SUR | Performed by: THORACIC SURGERY (CARDIOTHORACIC VASCULAR SURGERY)

## 2025-07-18 PROCEDURE — 2500000004 HC RX 250 GENERAL PHARMACY W/ HCPCS (ALT 636 FOR OP/ED): Mod: JZ,TB | Performed by: STUDENT IN AN ORGANIZED HEALTH CARE EDUCATION/TRAINING PROGRAM

## 2025-07-18 RX ORDER — HYDROMORPHONE HYDROCHLORIDE 0.2 MG/ML
0.2 INJECTION INTRAMUSCULAR; INTRAVENOUS; SUBCUTANEOUS EVERY 5 MIN PRN
Status: DISCONTINUED | OUTPATIENT
Start: 2025-07-18 | End: 2025-07-18 | Stop reason: HOSPADM

## 2025-07-18 RX ORDER — BUPIVACAINE HCL/EPINEPHRINE 0.25-.0005
VIAL (ML) INJECTION AS NEEDED
Status: DISCONTINUED | OUTPATIENT
Start: 2025-07-18 | End: 2025-07-18 | Stop reason: HOSPADM

## 2025-07-18 RX ORDER — PHENYLEPHRINE HCL IN 0.9% NACL 0.4MG/10ML
SYRINGE (ML) INTRAVENOUS AS NEEDED
Status: DISCONTINUED | OUTPATIENT
Start: 2025-07-18 | End: 2025-07-18

## 2025-07-18 RX ORDER — ACETAMINOPHEN 325 MG/1
650 TABLET ORAL ONCE
Status: COMPLETED | OUTPATIENT
Start: 2025-07-18 | End: 2025-07-18

## 2025-07-18 RX ORDER — ONDANSETRON HYDROCHLORIDE 2 MG/ML
INJECTION, SOLUTION INTRAVENOUS AS NEEDED
Status: DISCONTINUED | OUTPATIENT
Start: 2025-07-18 | End: 2025-07-18

## 2025-07-18 RX ORDER — SODIUM CHLORIDE, SODIUM LACTATE, POTASSIUM CHLORIDE, CALCIUM CHLORIDE 600; 310; 30; 20 MG/100ML; MG/100ML; MG/100ML; MG/100ML
100 INJECTION, SOLUTION INTRAVENOUS CONTINUOUS
Status: DISCONTINUED | OUTPATIENT
Start: 2025-07-18 | End: 2025-07-18 | Stop reason: HOSPADM

## 2025-07-18 RX ORDER — ROCURONIUM BROMIDE 10 MG/ML
INJECTION, SOLUTION INTRAVENOUS AS NEEDED
Status: DISCONTINUED | OUTPATIENT
Start: 2025-07-18 | End: 2025-07-18

## 2025-07-18 RX ORDER — ONDANSETRON HYDROCHLORIDE 2 MG/ML
4 INJECTION, SOLUTION INTRAVENOUS ONCE AS NEEDED
Status: DISCONTINUED | OUTPATIENT
Start: 2025-07-18 | End: 2025-07-18 | Stop reason: HOSPADM

## 2025-07-18 RX ORDER — ONDANSETRON 4 MG/1
4 TABLET, ORALLY DISINTEGRATING ORAL EVERY 8 HOURS PRN
Status: DISCONTINUED | OUTPATIENT
Start: 2025-07-18 | End: 2025-07-21 | Stop reason: HOSPADM

## 2025-07-18 RX ORDER — ACETAMINOPHEN 325 MG/1
650 TABLET ORAL EVERY 6 HOURS
Status: DISCONTINUED | OUTPATIENT
Start: 2025-07-18 | End: 2025-07-21 | Stop reason: HOSPADM

## 2025-07-18 RX ORDER — HYDRALAZINE HYDROCHLORIDE 20 MG/ML
5 INJECTION INTRAMUSCULAR; INTRAVENOUS EVERY 30 MIN PRN
Status: DISCONTINUED | OUTPATIENT
Start: 2025-07-18 | End: 2025-07-18 | Stop reason: HOSPADM

## 2025-07-18 RX ORDER — NALOXONE HYDROCHLORIDE 0.4 MG/ML
0.2 INJECTION, SOLUTION INTRAMUSCULAR; INTRAVENOUS; SUBCUTANEOUS AS NEEDED
Status: DISCONTINUED | OUTPATIENT
Start: 2025-07-18 | End: 2025-07-21 | Stop reason: HOSPADM

## 2025-07-18 RX ORDER — ONDANSETRON HYDROCHLORIDE 2 MG/ML
4 INJECTION, SOLUTION INTRAVENOUS EVERY 8 HOURS PRN
Status: DISCONTINUED | OUTPATIENT
Start: 2025-07-18 | End: 2025-07-21 | Stop reason: HOSPADM

## 2025-07-18 RX ORDER — HEPARIN SODIUM 5000 [USP'U]/ML
5000 INJECTION, SOLUTION INTRAVENOUS; SUBCUTANEOUS ONCE
Status: COMPLETED | OUTPATIENT
Start: 2025-07-18 | End: 2025-07-18

## 2025-07-18 RX ORDER — HYDROMORPHONE HYDROCHLORIDE 1 MG/ML
INJECTION, SOLUTION INTRAMUSCULAR; INTRAVENOUS; SUBCUTANEOUS AS NEEDED
Status: DISCONTINUED | OUTPATIENT
Start: 2025-07-18 | End: 2025-07-18

## 2025-07-18 RX ORDER — CEFAZOLIN SODIUM 2 G/100ML
2 INJECTION, SOLUTION INTRAVENOUS ONCE
Status: COMPLETED | OUTPATIENT
Start: 2025-07-18 | End: 2025-07-18

## 2025-07-18 RX ORDER — SODIUM CHLORIDE 0.9 G/100ML
INJECTION, SOLUTION IRRIGATION AS NEEDED
Status: DISCONTINUED | OUTPATIENT
Start: 2025-07-18 | End: 2025-07-18 | Stop reason: HOSPADM

## 2025-07-18 RX ORDER — MIDAZOLAM HYDROCHLORIDE 1 MG/ML
INJECTION, SOLUTION INTRAMUSCULAR; INTRAVENOUS AS NEEDED
Status: DISCONTINUED | OUTPATIENT
Start: 2025-07-18 | End: 2025-07-18

## 2025-07-18 RX ORDER — DROPERIDOL 2.5 MG/ML
0.62 INJECTION, SOLUTION INTRAMUSCULAR; INTRAVENOUS ONCE AS NEEDED
Status: DISCONTINUED | OUTPATIENT
Start: 2025-07-18 | End: 2025-07-18 | Stop reason: HOSPADM

## 2025-07-18 RX ORDER — BUDESONIDE 0.25 MG/2ML
0.25 INHALANT ORAL
Status: DISCONTINUED | OUTPATIENT
Start: 2025-07-18 | End: 2025-07-21 | Stop reason: HOSPADM

## 2025-07-18 RX ORDER — GABAPENTIN 300 MG/1
300 CAPSULE ORAL ONCE
Status: COMPLETED | OUTPATIENT
Start: 2025-07-18 | End: 2025-07-18

## 2025-07-18 RX ORDER — ESMOLOL HYDROCHLORIDE 10 MG/ML
INJECTION INTRAVENOUS AS NEEDED
Status: DISCONTINUED | OUTPATIENT
Start: 2025-07-18 | End: 2025-07-18

## 2025-07-18 RX ORDER — LIDOCAINE HYDROCHLORIDE 10 MG/ML
0.1 INJECTION, SOLUTION INFILTRATION; PERINEURAL ONCE
Status: DISCONTINUED | OUTPATIENT
Start: 2025-07-18 | End: 2025-07-18 | Stop reason: HOSPADM

## 2025-07-18 RX ORDER — ENOXAPARIN SODIUM 100 MG/ML
40 INJECTION SUBCUTANEOUS EVERY 24 HOURS
Status: DISCONTINUED | OUTPATIENT
Start: 2025-07-19 | End: 2025-07-21 | Stop reason: HOSPADM

## 2025-07-18 RX ORDER — ALBUTEROL SULFATE 0.83 MG/ML
2.5 SOLUTION RESPIRATORY (INHALATION) ONCE AS NEEDED
Status: DISCONTINUED | OUTPATIENT
Start: 2025-07-18 | End: 2025-07-18 | Stop reason: HOSPADM

## 2025-07-18 RX ORDER — PROPOFOL 10 MG/ML
INJECTION, EMULSION INTRAVENOUS AS NEEDED
Status: DISCONTINUED | OUTPATIENT
Start: 2025-07-18 | End: 2025-07-18

## 2025-07-18 RX ORDER — METHOCARBAMOL 500 MG/1
500 TABLET, FILM COATED ORAL EVERY 8 HOURS SCHEDULED
Status: DISCONTINUED | OUTPATIENT
Start: 2025-07-18 | End: 2025-07-21 | Stop reason: HOSPADM

## 2025-07-18 RX ORDER — FENTANYL CITRATE 50 UG/ML
INJECTION, SOLUTION INTRAMUSCULAR; INTRAVENOUS AS NEEDED
Status: DISCONTINUED | OUTPATIENT
Start: 2025-07-18 | End: 2025-07-18

## 2025-07-18 RX ORDER — NITROGLYCERIN 40 MG/100ML
INJECTION INTRAVENOUS AS NEEDED
Status: DISCONTINUED | OUTPATIENT
Start: 2025-07-18 | End: 2025-07-18

## 2025-07-18 RX ORDER — LIDOCAINE HYDROCHLORIDE 20 MG/ML
INJECTION, SOLUTION INFILTRATION; PERINEURAL AS NEEDED
Status: DISCONTINUED | OUTPATIENT
Start: 2025-07-18 | End: 2025-07-18

## 2025-07-18 RX ORDER — HYDROMORPHONE HCL/0.9% NACL/PF 15 MG/30ML
PATIENT CONTROLLED ANALGESIA SYRINGE INTRAVENOUS CONTINUOUS
Status: DISCONTINUED | OUTPATIENT
Start: 2025-07-18 | End: 2025-07-19

## 2025-07-18 RX ORDER — ACETAMINOPHEN 325 MG/1
650 TABLET ORAL EVERY 4 HOURS PRN
Status: DISCONTINUED | OUTPATIENT
Start: 2025-07-18 | End: 2025-07-18 | Stop reason: HOSPADM

## 2025-07-18 RX ORDER — GLYCOPYRROLATE 0.2 MG/ML
INJECTION INTRAMUSCULAR; INTRAVENOUS AS NEEDED
Status: DISCONTINUED | OUTPATIENT
Start: 2025-07-18 | End: 2025-07-18

## 2025-07-18 RX ORDER — SUCCINYLCHOLINE CHLORIDE 20 MG/ML
INJECTION INTRAMUSCULAR; INTRAVENOUS AS NEEDED
Status: DISCONTINUED | OUTPATIENT
Start: 2025-07-18 | End: 2025-07-18

## 2025-07-18 RX ADMIN — ACETAMINOPHEN 650 MG: 325 TABLET ORAL at 09:13

## 2025-07-18 RX ADMIN — DEXAMETHASONE SODIUM PHOSPHATE 4 MG: 4 INJECTION INTRA-ARTICULAR; INTRALESIONAL; INTRAMUSCULAR; INTRAVENOUS; SOFT TISSUE at 10:11

## 2025-07-18 RX ADMIN — SUCCINYLCHOLINE CHLORIDE 120 MG: 20 INJECTION, SOLUTION INTRAMUSCULAR; INTRAVENOUS at 09:39

## 2025-07-18 RX ADMIN — PROPOFOL 30 MG: 10 INJECTION, EMULSION INTRAVENOUS at 10:32

## 2025-07-18 RX ADMIN — METHOCARBAMOL 500 MG: 500 TABLET ORAL at 17:22

## 2025-07-18 RX ADMIN — ACETAMINOPHEN 650 MG: 325 TABLET ORAL at 17:21

## 2025-07-18 RX ADMIN — VASOPRESSIN 1 UNITS: 20 INJECTION INTRAVENOUS at 09:55

## 2025-07-18 RX ADMIN — HYDROMORPHONE HYDROCHLORIDE 0.5 MG: 1 INJECTION, SOLUTION INTRAMUSCULAR; INTRAVENOUS; SUBCUTANEOUS at 12:20

## 2025-07-18 RX ADMIN — HYDROMORPHONE HYDROCHLORIDE 0.25 MG: 1 INJECTION, SOLUTION INTRAMUSCULAR; INTRAVENOUS; SUBCUTANEOUS at 11:11

## 2025-07-18 RX ADMIN — NITROGLYCERIN 50 MCG: 10 INJECTION INTRAVENOUS at 11:03

## 2025-07-18 RX ADMIN — NITROGLYCERIN 100 MCG: 10 INJECTION INTRAVENOUS at 11:13

## 2025-07-18 RX ADMIN — GABAPENTIN 300 MG: 300 CAPSULE ORAL at 09:14

## 2025-07-18 RX ADMIN — NITROGLYCERIN 50 MCG: 10 INJECTION INTRAVENOUS at 11:07

## 2025-07-18 RX ADMIN — CEFAZOLIN SODIUM 2 G: 2 INJECTION, SOLUTION INTRAVENOUS at 09:49

## 2025-07-18 RX ADMIN — GLYCOPYRROLATE 0.2 MG: 0.2 SOLUTION INTRAMUSCULAR; INTRAVENOUS at 10:11

## 2025-07-18 RX ADMIN — NITROGLYCERIN 100 MCG: 10 INJECTION INTRAVENOUS at 11:09

## 2025-07-18 RX ADMIN — HEPARIN SODIUM 5000 UNITS: 5000 INJECTION, SOLUTION INTRAVENOUS; SUBCUTANEOUS at 09:14

## 2025-07-18 RX ADMIN — ONDANSETRON 4 MG: 2 INJECTION, SOLUTION INTRAMUSCULAR; INTRAVENOUS at 10:54

## 2025-07-18 RX ADMIN — ROCURONIUM BROMIDE 20 MG: 10 INJECTION, SOLUTION INTRAVENOUS at 10:28

## 2025-07-18 RX ADMIN — NITROGLYCERIN 50 MCG: 10 INJECTION INTRAVENOUS at 11:04

## 2025-07-18 RX ADMIN — SODIUM CHLORIDE, SODIUM LACTATE, POTASSIUM CHLORIDE, AND CALCIUM CHLORIDE: 600; 310; 30; 20 INJECTION, SOLUTION INTRAVENOUS at 09:17

## 2025-07-18 RX ADMIN — ESMOLOL HYDROCHLORIDE 40 MG: 10 INJECTION, SOLUTION INTRAVENOUS at 11:12

## 2025-07-18 RX ADMIN — HYDROMORPHONE HYDROCHLORIDE 0.5 MG: 1 INJECTION, SOLUTION INTRAMUSCULAR; INTRAVENOUS; SUBCUTANEOUS at 11:02

## 2025-07-18 RX ADMIN — METHOCARBAMOL 500 MG: 500 TABLET ORAL at 22:00

## 2025-07-18 RX ADMIN — NITROGLYCERIN 50 MCG: 10 INJECTION INTRAVENOUS at 11:08

## 2025-07-18 RX ADMIN — GABAPENTIN 100 MG: 100 CAPSULE ORAL at 20:38

## 2025-07-18 RX ADMIN — FENTANYL CITRATE 50 MCG: 50 INJECTION, SOLUTION INTRAMUSCULAR; INTRAVENOUS at 10:28

## 2025-07-18 RX ADMIN — Medication 6 L/MIN: at 11:12

## 2025-07-18 RX ADMIN — Medication 120 MCG: at 09:53

## 2025-07-18 RX ADMIN — PROPOFOL 170 MG: 10 INJECTION, EMULSION INTRAVENOUS at 09:39

## 2025-07-18 RX ADMIN — FENTANYL CITRATE 50 MCG: 50 INJECTION, SOLUTION INTRAMUSCULAR; INTRAVENOUS at 09:39

## 2025-07-18 RX ADMIN — MIDAZOLAM 1 MG: 1 INJECTION INTRAMUSCULAR; INTRAVENOUS at 09:17

## 2025-07-18 RX ADMIN — HYDROMORPHONE HYDROCHLORIDE 0.5 MG: 1 INJECTION, SOLUTION INTRAMUSCULAR; INTRAVENOUS; SUBCUTANEOUS at 12:40

## 2025-07-18 RX ADMIN — NITROGLYCERIN 50 MCG: 10 INJECTION INTRAVENOUS at 11:05

## 2025-07-18 RX ADMIN — ROCURONIUM BROMIDE 5 MG: 10 INJECTION, SOLUTION INTRAVENOUS at 09:39

## 2025-07-18 RX ADMIN — SUGAMMADEX 200 MG: 100 INJECTION, SOLUTION INTRAVENOUS at 11:11

## 2025-07-18 RX ADMIN — NITROGLYCERIN 100 MCG: 10 INJECTION INTRAVENOUS at 11:12

## 2025-07-18 RX ADMIN — DOCUSATE SODIUM 100 MG: 100 CAPSULE, LIQUID FILLED ORAL at 20:38

## 2025-07-18 RX ADMIN — Medication 120 MCG: at 10:48

## 2025-07-18 RX ADMIN — LIDOCAINE HYDROCHLORIDE 50 MG: 20 INJECTION, SOLUTION INFILTRATION; PERINEURAL at 09:39

## 2025-07-18 RX ADMIN — Medication: at 12:42

## 2025-07-18 RX ADMIN — NITROGLYCERIN 100 MCG: 10 INJECTION INTRAVENOUS at 11:11

## 2025-07-18 RX ADMIN — ROCURONIUM BROMIDE 45 MG: 10 INJECTION, SOLUTION INTRAVENOUS at 09:45

## 2025-07-18 SDOH — ECONOMIC STABILITY: FOOD INSECURITY: HOW HARD IS IT FOR YOU TO PAY FOR THE VERY BASICS LIKE FOOD, HOUSING, MEDICAL CARE, AND HEATING?: NOT HARD AT ALL

## 2025-07-18 SDOH — SOCIAL STABILITY: SOCIAL INSECURITY: HAVE YOU HAD THOUGHTS OF HARMING ANYONE ELSE?: NO

## 2025-07-18 SDOH — HEALTH STABILITY: MENTAL HEALTH: CURRENT SMOKER: 0

## 2025-07-18 SDOH — ECONOMIC STABILITY: FOOD INSECURITY: WITHIN THE PAST 12 MONTHS, YOU WORRIED THAT YOUR FOOD WOULD RUN OUT BEFORE YOU GOT THE MONEY TO BUY MORE.: NEVER TRUE

## 2025-07-18 SDOH — ECONOMIC STABILITY: HOUSING INSECURITY: IN THE LAST 12 MONTHS, WAS THERE A TIME WHEN YOU WERE NOT ABLE TO PAY THE MORTGAGE OR RENT ON TIME?: NO

## 2025-07-18 SDOH — ECONOMIC STABILITY: INCOME INSECURITY: IN THE PAST 12 MONTHS HAS THE ELECTRIC, GAS, OIL, OR WATER COMPANY THREATENED TO SHUT OFF SERVICES IN YOUR HOME?: NO

## 2025-07-18 SDOH — ECONOMIC STABILITY: FOOD INSECURITY: WITHIN THE PAST 12 MONTHS, THE FOOD YOU BOUGHT JUST DIDN'T LAST AND YOU DIDN'T HAVE MONEY TO GET MORE.: NEVER TRUE

## 2025-07-18 SDOH — SOCIAL STABILITY: SOCIAL INSECURITY: ARE YOU OR HAVE YOU BEEN THREATENED OR ABUSED PHYSICALLY, EMOTIONALLY, OR SEXUALLY BY ANYONE?: NO

## 2025-07-18 SDOH — SOCIAL STABILITY: SOCIAL INSECURITY: WITHIN THE LAST YEAR, HAVE YOU BEEN AFRAID OF YOUR PARTNER OR EX-PARTNER?: NO

## 2025-07-18 SDOH — SOCIAL STABILITY: SOCIAL INSECURITY: WERE YOU ABLE TO COMPLETE ALL THE BEHAVIORAL HEALTH SCREENINGS?: YES

## 2025-07-18 SDOH — ECONOMIC STABILITY: HOUSING INSECURITY: DO YOU FEEL UNSAFE GOING BACK TO THE PLACE WHERE YOU LIVE?: NO

## 2025-07-18 SDOH — ECONOMIC STABILITY: HOUSING INSECURITY: AT ANY TIME IN THE PAST 12 MONTHS, WERE YOU HOMELESS OR LIVING IN A SHELTER (INCLUDING NOW)?: NO

## 2025-07-18 SDOH — SOCIAL STABILITY: SOCIAL INSECURITY: DO YOU FEEL UNSAFE GOING BACK TO THE PLACE WHERE YOU ARE LIVING?: NO

## 2025-07-18 SDOH — SOCIAL STABILITY: SOCIAL INSECURITY: WITHIN THE LAST YEAR, HAVE YOU BEEN HUMILIATED OR EMOTIONALLY ABUSED IN OTHER WAYS BY YOUR PARTNER OR EX-PARTNER?: NO

## 2025-07-18 SDOH — SOCIAL STABILITY: SOCIAL INSECURITY: ARE THERE ANY APPARENT SIGNS OF INJURIES/BEHAVIORS THAT COULD BE RELATED TO ABUSE/NEGLECT?: NO

## 2025-07-18 SDOH — ECONOMIC STABILITY: HOUSING INSECURITY: IN THE PAST 12 MONTHS, HOW MANY TIMES HAVE YOU MOVED WHERE YOU WERE LIVING?: 0

## 2025-07-18 SDOH — ECONOMIC STABILITY: TRANSPORTATION INSECURITY: IN THE PAST 12 MONTHS, HAS LACK OF TRANSPORTATION KEPT YOU FROM MEDICAL APPOINTMENTS OR FROM GETTING MEDICATIONS?: NO

## 2025-07-18 SDOH — SOCIAL STABILITY: SOCIAL INSECURITY: HAVE YOU HAD ANY THOUGHTS OF HARMING ANYONE ELSE?: NO

## 2025-07-18 SDOH — SOCIAL STABILITY: SOCIAL INSECURITY: ABUSE: ADULT

## 2025-07-18 SDOH — SOCIAL STABILITY: SOCIAL INSECURITY: DOES ANYONE TRY TO KEEP YOU FROM HAVING/CONTACTING OTHER FRIENDS OR DOING THINGS OUTSIDE YOUR HOME?: NO

## 2025-07-18 SDOH — SOCIAL STABILITY: SOCIAL INSECURITY: HAS ANYONE EVER THREATENED TO HURT YOUR FAMILY OR YOUR PETS?: NO

## 2025-07-18 SDOH — SOCIAL STABILITY: SOCIAL INSECURITY: DO YOU FEEL ANYONE HAS EXPLOITED OR TAKEN ADVANTAGE OF YOU FINANCIALLY OR OF YOUR PERSONAL PROPERTY?: NO

## 2025-07-18 ASSESSMENT — PAIN DESCRIPTION - ORIENTATION
ORIENTATION: LEFT
ORIENTATION: LEFT

## 2025-07-18 ASSESSMENT — COGNITIVE AND FUNCTIONAL STATUS - GENERAL
MOVING FROM LYING ON BACK TO SITTING ON SIDE OF FLAT BED WITH BEDRAILS: A LITTLE
DRESSING REGULAR LOWER BODY CLOTHING: A LITTLE
STANDING UP FROM CHAIR USING ARMS: A LITTLE
MOBILITY SCORE: 18
DRESSING REGULAR UPPER BODY CLOTHING: A LITTLE
HELP NEEDED FOR BATHING: A LITTLE
TOILETING: A LITTLE
TOILETING: A LITTLE
TURNING FROM BACK TO SIDE WHILE IN FLAT BAD: A LITTLE
TURNING FROM BACK TO SIDE WHILE IN FLAT BAD: A LITTLE
MOVING TO AND FROM BED TO CHAIR: A LITTLE
WALKING IN HOSPITAL ROOM: A LITTLE
DRESSING REGULAR UPPER BODY CLOTHING: A LITTLE
DRESSING REGULAR LOWER BODY CLOTHING: A LITTLE
MOVING TO AND FROM BED TO CHAIR: A LITTLE
MOVING FROM LYING ON BACK TO SITTING ON SIDE OF FLAT BED WITH BEDRAILS: A LITTLE
DAILY ACTIVITIY SCORE: 19
STANDING UP FROM CHAIR USING ARMS: A LITTLE
DAILY ACTIVITIY SCORE: 19
PERSONAL GROOMING: A LITTLE
CLIMB 3 TO 5 STEPS WITH RAILING: A LITTLE
PATIENT BASELINE BEDBOUND: NO
MOBILITY SCORE: 18
HELP NEEDED FOR BATHING: A LITTLE
CLIMB 3 TO 5 STEPS WITH RAILING: A LITTLE
PERSONAL GROOMING: A LITTLE
WALKING IN HOSPITAL ROOM: A LITTLE

## 2025-07-18 ASSESSMENT — PAIN - FUNCTIONAL ASSESSMENT
PAIN_FUNCTIONAL_ASSESSMENT: UNABLE TO SELF-REPORT
PAIN_FUNCTIONAL_ASSESSMENT: 0-10
PAIN_FUNCTIONAL_ASSESSMENT: 0-10
PAIN_FUNCTIONAL_ASSESSMENT: UNABLE TO SELF-REPORT
PAIN_FUNCTIONAL_ASSESSMENT: 0-10
PAIN_FUNCTIONAL_ASSESSMENT: UNABLE TO SELF-REPORT
PAIN_FUNCTIONAL_ASSESSMENT: 0-10
PAIN_FUNCTIONAL_ASSESSMENT: UNABLE TO SELF-REPORT
PAIN_FUNCTIONAL_ASSESSMENT: 0-10
PAIN_FUNCTIONAL_ASSESSMENT: 0-10
PAIN_FUNCTIONAL_ASSESSMENT: UNABLE TO SELF-REPORT

## 2025-07-18 ASSESSMENT — ACTIVITIES OF DAILY LIVING (ADL)
ADEQUATE_TO_COMPLETE_ADL: YES
FEEDING YOURSELF: INDEPENDENT
GROOMING: INDEPENDENT
DRESSING YOURSELF: INDEPENDENT
TOILETING: INDEPENDENT
LACK_OF_TRANSPORTATION: NO
HEARING - LEFT EAR: FUNCTIONAL
PATIENT'S MEMORY ADEQUATE TO SAFELY COMPLETE DAILY ACTIVITIES?: YES
LACK_OF_TRANSPORTATION: NO
HEARING - RIGHT EAR: FUNCTIONAL
BATHING: INDEPENDENT
WALKS IN HOME: INDEPENDENT
JUDGMENT_ADEQUATE_SAFELY_COMPLETE_DAILY_ACTIVITIES: YES

## 2025-07-18 ASSESSMENT — LIFESTYLE VARIABLES
AUDIT-C TOTAL SCORE: 0
SKIP TO QUESTIONS 9-10: 1
HOW OFTEN DO YOU HAVE A DRINK CONTAINING ALCOHOL: NEVER
HOW OFTEN DO YOU HAVE 6 OR MORE DRINKS ON ONE OCCASION: NEVER
AUDIT-C TOTAL SCORE: 0
HOW MANY STANDARD DRINKS CONTAINING ALCOHOL DO YOU HAVE ON A TYPICAL DAY: PATIENT DOES NOT DRINK

## 2025-07-18 ASSESSMENT — PAIN SCALES - GENERAL
PAINLEVEL_OUTOF10: 0 - NO PAIN
PAINLEVEL_OUTOF10: 6
PAINLEVEL_OUTOF10: 8
PAINLEVEL_OUTOF10: 5 - MODERATE PAIN
PAIN_LEVEL: 0
PAINLEVEL_OUTOF10: 7
PAINLEVEL_OUTOF10: 0 - NO PAIN

## 2025-07-18 ASSESSMENT — PAIN DESCRIPTION - PROGRESSION: CLINICAL_PROGRESSION: NOT CHANGED

## 2025-07-18 ASSESSMENT — PAIN DESCRIPTION - LOCATION
LOCATION: CHEST
LOCATION: CHEST

## 2025-07-18 NOTE — OP NOTE
INSERTION PLEURAL CATHETER (L), VIDEO-ASSISTED THORACOSCOPY, POSSIBLE PLEURAL BIOPSY (L) Operative Note  Patient: Fortunato Romano  : 1955  MRN: [id number]    Preoperative Diagnosis: Pneumothorax on left [J93.9]  Postoperative Diagnosis: Pneumothorax on left [J93.9]    Procedure: Left VATS, partial pleurectomy/pleural biopsy    Surgeon: Surgeon(s):  MD Sagar Monroe MD    Other Staff:   Surgeons and Role:     * Casandra Bourgeois MD - Fellow   Circulator: Casey Amanda RN; Alba Gupta RN  Scrub Person: Galilea Rodney; Vicki Davis    Anesthesia Type: General    Indications: Fortunato Romano is an 69 y.o. male who presents for Pneumothorax on left [J93.9].  His pneumothorax had increased over time and was symptomatic.  I recommended thoracoscopic evaluation    The patient was seen in the preoperative area. The risks, benefits, complications, treatment options, non-operative alternatives, expected recovery and outcomes were discussed with the patient. The possibilities of reaction to medication, pulmonary aspiration, injury to surrounding structures, bleeding, recurrent infection, the need for additional procedures, failure to diagnose a condition, and creating a complication requiring transfusion or operation were discussed with the patient. The patient concurred with the proposed plan, giving informed consent.  The site of surgery was properly noted/marked if necessary per policy.     Procedure Details:   The patient was placed on the operating table. A safety huddle was performed. General endotracheal anesthesia was initiated. Bronchoscopy was performed using the endotracheal tube which showed no evidence of endobronchial tumors or other lesions.  A double-lumen endotracheal tube was positioned and secured.    The patient was positioned in lateral decubitus position. The patient was prepped with chlorhexidine & alcohol.    I started making 2 thoracoscopic port incisions in the left  chest. I placed multi-level intercostal nerve blocks.  I inserted a thoracoscope and explored the chest.  The pleura was thickened.  There was a small pleural effusion.  We carefully examined the lung, and did not appreciate any obvious injuries, blebs, or sites of air leak.  We submerged the lung in saline, and did not appreciate any air leak when the lung was reexpanded.  To reduce the chances of a pneumothorax in the future we performed a partial pleurectomy/large pleural biopsy.  At the conclusion of this we evaluated the chest for hemostasis and felt it was appropriate.  To assure appropriate expansion of the lung we placed to 28 [place] chest tubes.  The lung was expanded and expanded well.  Again we did not appreciate an air leak.    The wounds were closed in layers.  The skin was dressed with Dermabond.  The procedure was completed and patient was brought to Recovery without evidence of immediate complications.              Estimated blood loss: 25  Complications: None  Disposition: Recovery  Condition: stable    Attending Attestation: I was present and scrubbed for the entire procedure.    Specimens:   ID Type Source Tests Collected by Time   1 : pleura biopsy Tissue PLEURA TISSUE RESECTION LEFT SURGICAL PATHOLOGY EXAM Sagar Grissom MD 7/18/2025 1032       Sagar Grissom  Phone Number: [phone]

## 2025-07-18 NOTE — ANESTHESIA PROCEDURE NOTES
Peripheral IV  Date/Time: 7/18/2025 9:44 AM      Placement  Needle size: 18 G  Laterality: left  Location: hand  Local anesthetic: none  Site prep: alcohol  Technique: anatomical landmarks  Attempts: 1

## 2025-07-18 NOTE — H&P
[facility]  THORACIC SURGERY - HISTORY AND PHYSICAL / CONSULT    Patient Name: Fortunato Romano  MRN: [id number]  Admit Date: 717  : 1955  AGE: 69 y.o.   GENDER: male  ==============================================================================  TODAY'S ASSESSMENT AND PLAN OF CARE:  Fortunato Romano is a 69 year old male with PMHx significant for metastatic non-small cell lung carcinoma (EGFR positive of HILARIA), HTN, HLD, depression/anxiety, and gout. Patient presenting for recurrent pneumothorax. Hx of spontaneous pneumothorax 2024 leading to chest tube and recurrent pneumothoraces since as recently as 6/3/25. Patient with slow worsening of symptoms prompting admission for planned VATS and possible PleurX placement.     Plan:  -Admit to thoracic surgery  -Consented for L VATS possible PleurX placement and other indicated procedures on  with Dr. Jaciel BAUTISTA at [facility]     D/w Dr. Jaciel Matias MD  PGY-2 Gen Surg  Thoracic Surgery [id number]    ==============================================================================  CHIEF COMPLAINT/REASON FOR CONSULT:  Fortunato Romano is a 69 year old male with PMHx significant for metastatic non-small cell lung carcinoma (EGFR positive of HILARIA), HTN, HLD, depression/anxiety, and gout. Patient presenting for recurrent pneumothorax. Hx of spontaneous pneumothorax 2024 leading to chest tube and recurrent pneumothoraces since as recently as 6/3/25. Patient with slow worsening of symptoms prompting admission for planned VATS and possible PleurX placement.     [place] with [place] [facility] [place] which lead to chest tube removal on 2025. Patient pneumothorax at that time thought to be secondary to trapped lung in setting of malignancy. Patient notes that he has been having shortness of breath with exertion that is worse than when he was previously in hospital. Patient denies nausea, vomiting, chest pain, shortness of breath,  cough, dizziness. Patient states that he is no longer smoking but that he has previously used cigarettes.     PAST MEDICAL HISTORY:   PMH:   Medical History[1]    PSH:   Surgical History[2]  FH:   Family History[3]  SOCIAL HISTORY:    Smoking:  Tobacco Use History[4]    Alcohol:    Social History     Substance and Sexual Activity   Alcohol Use Yes    Comment: occasional       Drug use: Denies    MEDICATIONS:   Prior to Admission medications   Medication Sig Start Date End Date Taking? Authorizing Provider   acetaminophen (Tylenol) 325 mg tablet Take 2 tablets (650 mg) by mouth every 6 hours if needed for mild pain (1 - 3). 6/4/25   SARAH Alcazar   allopurinol (Zyloprim) 300 mg tablet Take 1 tablet (300 mg) by mouth once daily. 3/18/25   Lin Barbosa MD   docusate sodium (Colace) 100 mg capsule Take 1 capsule (100 mg) by mouth 2 times a day.  Patient not taking: Reported on 6/18/2025 6/4/25   SARAH Alcazar   fluticasone-umeclidin-vilanter (Trelegy Ellipta) 100-62.5-25 mcg blister with device Inhale 1 puff once daily. 3/18/25   Lin Barbosa MD   gabapentin (Neurontin) 100 mg capsule Take 1 capsule (100 mg) by mouth 2 times a day. 3/18/25   Lin Barbosa MD   mv-min/folic/K1/lycopen/lutein (MEN 50 PLUS MULTIVITAMIN ORAL) Take 1 tablet by mouth once daily.    Historical Provider, MD   olmesartan (BENIcar) 40 mg tablet Take 1 tablet (40 mg) by mouth once daily. 3/18/25   Lin Barbosa MD   osimertinib (Tagrisso) 80 mg tablet Take 1 tablet (80 mg total) by mouth once daily. 4/24/24   Historical Provider, MD   PARoxetine (Paxil) 20 mg tablet Take 1 tablet (20 mg) by mouth once daily in the morning. Take before meals. 3/18/25   Lin Barbosa MD   Protonix 20 mg EC tablet Take 1 tablet (20 mg) by mouth once daily.  Patient not taking: Reported on 6/18/2025 5/20/25   Historical Provider, MD   rosuvastatin (Crestor) 5 mg tablet Take 1 tablet (5 mg) by mouth once  "daily. 7/3/25   Donnie Lacy MD   UNABLE TO FIND Take 1 capsule by mouth once daily. Med Name: Prevagen  Patient not taking: Reported on 6/18/2025    Historical Provider, MD     ALLERGIES:   RX Allergies[5]    REVIEW OF SYSTEMS:  12 point ROS as per HPI     PHYSICAL EXAM:  BP (!) 152/94   Pulse 71   Temp 36.3 °C (97.3 °F) (Temporal)   Resp 18   Ht 1.676 m (5' 6\")   Wt 61.2 kg (135 lb)   SpO2 96%   BMI 21.79 kg/m²   Constitutional: NAD, A&Ox3   Head/Neck: NCAT  Eyes: Anicteric   Cardiovascular: Regular rate and rhythm per peripheral palpation   Respiratory: Breathing comfortably on RA with symmetric chest rise   Abdominal: Soft, non tender, non-distended without rebound or guarding   : Deferred   Ext: MAEx4  Psych: Appropriate mood and affect     IMAGING SUMMARY:  (summary of findings, not a copy of dictation)  XR chest 2 views  Narrative: Interpreted By:  Beltran Way,   STUDY:  XR CHEST 2 VIEWS;  6/18/2025 8:13 am      INDICATION:  Signs/Symptoms:hosp f/u.      ,J93.9 Pneumothorax, unspecified      COMPARISON:  06/04/2025 reporting minimal increase in the left lateral and basilar  pneumothorax with residual left apical component.      ACCESSION NUMBER(S):  [id number]      ORDERING CLINICIAN:  ROSELIA GAMBOA      FINDINGS:      Air-fluid level of the left hemithorax consistent with  hydropneumothorax, new since prior examination.      Marginal increase in the left apical pneumothorax at 4 cm previously  2.7 cm.      Marginal increase in size of the left lung base pneumothorax since  prior examination. Mediastinum is shifted slightly to the left  similar to prior examination.          Impression: 1.  Radiographic worsening. Development of a left pleural effusion  resulting in a left hydropneumothorax. Marginal enlargement of left  apical and base pneumothorax.      I spoke to the clinician at 9:51 a.m.      MACRO:  Beltran Way discussed the significance and urgency of this  critical finding by " "EPIC secure chat with  ROSELIA GAMBOA on  6/20/2025 at 9:25 am.  (**-RCF-**) Findings:  See findings.      Signed by: Beltran Way 6/20/2025 9:51 AM  Dictation workstation:   [id number]        LABS:                No lab exists for component: \"LABALBU\"              I have reviewed all laboratory and imaging results ordered/pertinent for this encounter.         [1]   Past Medical History:  Diagnosis Date    Benign prostatic hyperplasia without lower urinary tract symptoms 05/25/2021    Enlarged prostate without lower urinary tract symptoms (luts)    Encounter for screening for malignant neoplasm of respiratory organs 05/02/2022    Encounter for screening for lung cancer    Essential (primary) hypertension 05/13/2021    Benign essential hypertension    Generalized anxiety disorder 05/25/2021    VIVEK (generalized anxiety disorder)    Mixed hyperlipidemia 05/13/2021    Hyperlipidemia, mixed    Other abnormal glucose     Elevated glucose    Other conditions influencing health status     Postoperative seroma    Other conditions influencing health status 02/05/2018    History of cough    Other specified soft tissue disorders 12/14/2017    Soft tissue mass    Pain in left knee 05/30/2017    Left knee pain    Personal history of benign neoplasm of the brain 05/13/2021    History of benign neoplasm of brain    Personal history of neoplasm of uncertain behavior 05/07/2018    History of neoplasm of uncertain behavior    Personal history of other diseases of the circulatory system 05/08/2019    History of hypertension    Personal history of other diseases of the digestive system 05/02/2022    History of fatty infiltration of liver    Personal history of other diseases of the musculoskeletal system and connective tissue 07/05/2022    History of polymyalgia rheumatica    Personal history of other diseases of the musculoskeletal system and connective tissue 11/04/2021    History of gout    Personal history of other diseases " of the respiratory system 05/02/2022    History of sinusitis    Personal history of other diseases of the respiratory system 02/05/2018    History of upper respiratory infection    Personal history of other endocrine, nutritional and metabolic disease 11/04/2021    History of diabetes mellitus    Personal history of other endocrine, nutritional and metabolic disease 05/08/2019    History of hyperlipidemia    Personal history of other endocrine, nutritional and metabolic disease 05/25/2021    History of vitamin D deficiency    Personal history of other specified conditions 02/13/2018    History of ataxia    Personal history of other specified conditions 07/22/2016    History of urinary frequency    Personal history of other specified conditions 07/22/2016    History of fever    Personal history of other specified conditions 11/20/2017    History of bradycardia    Vitamin D deficiency, unspecified 09/08/2015    Vitamin D deficiency   [2]   Past Surgical History:  Procedure Laterality Date    MANDIBLE SURGERY  11/01/2017    Jaw Surgery    OTHER SURGICAL HISTORY  07/22/2016    Brain Surgery   [3]   Family History  Problem Relation Name Age of Onset    Stomach cancer Mother      Stomach cancer Sister      Brain cancer Son     [4]   Social History  Tobacco Use   Smoking Status Former    Types: Cigarettes   Smokeless Tobacco Never   [5]   Allergies  Allergen Reactions    Levonorgestrel-Ethinyl Estrad Unknown    Pollen Extracts Unknown

## 2025-07-18 NOTE — ANESTHESIA POSTPROCEDURE EVALUATION
Patient: Fortunato Romano    Procedure Summary       Date: 07/18/25 Room / Location: [facility] OR 20 / [facility] OR    Anesthesia Start: 0917 Anesthesia Stop: 1126    Procedures:       INSERTION PLEURAL CATHETER (Left: Chest)      VIDEO-ASSISTED THORACOSCOPY, POSSIBLE PLEURAL BIOPSY (Left: Chest) Diagnosis:       Pneumothorax on left      (Pneumothorax on left [J93.9])    Surgeons: Sagar Grissom MD Responsible Provider: Nghia Batres MD    Anesthesia Type: general ASA Status: 3            Anesthesia Type: general    Vitals Value Taken Time   /81 07/18/25 11:20   Temp 36.2 07/18/25 11:26   Pulse 91 07/18/25 11:22   Resp 22 07/18/25 11:22   SpO2 95 % 07/18/25 11:22   Vitals shown include unfiled device data.    Anesthesia Post Evaluation    Patient location during evaluation: PACU  Patient participation: complete - patient participated  Level of consciousness: sleepy but conscious  Pain score: 0  Pain management: adequate  Airway patency: patent  Cardiovascular status: acceptable, blood pressure returned to baseline and hemodynamically stable  Respiratory status: acceptable, face mask and nonlabored ventilation  Hydration status: acceptable  Postoperative Nausea and Vomiting: none        No notable events documented.

## 2025-07-18 NOTE — ANESTHESIA PREPROCEDURE EVALUATION
Patient: Fortunato Romano    Procedure Information       Date/Time: 07/18/25 0915    Procedures:       INSERTION PLEURAL CATHETER (Left: Chest)      VIDEO-ASSISTED THORACOSCOPY, POSSIBLE PLEURAL BIOPSY (Left: Chest)    Location: [facility] OR 20 / [facility]    Surgeons: Sagar Grissom MD            Relevant Problems   Anesthesia (within normal limits)      Cardiac   (+) Benign essential hypertension   (+) Hyperlipidemia      Pulmonary   (+) Lung cancer, primary, with metastasis from lung to other site (Multi)      Neuro   (+) Anxiety and depression       Clinical information reviewed:   Tobacco  Allergies  Meds   Med Hx  Surg Hx   Fam Hx  Soc Hx        NPO Detail:  NPO/Void Status  Carbohydrate Drink Given Prior to Surgery? : N  Date of Last Solid: 07/17/25  Time of Last Solid: 2100  Last Intake Type: Food  Time of Last Void: 0700         Physical Exam    Airway  Mallampati: I  TM distance: >3 FB  Neck ROM: full  Mouth opening: 3 or more finger widths     Cardiovascular    Dental        Pulmonary    Abdominal            Anesthesia Plan    History of general anesthesia?: yes  History of complications of general anesthesia?: no    ASA 3     general     The patient is not a current smoker.  Patient did not smoke on day of procedure.    intravenous induction   Postoperative pain plan includes opioids.  Trial extubation is planned.  Anesthetic plan and risks discussed with patient.  Use of blood products discussed with patient who consented to blood products.    Plan discussed with resident.    Plan general endotracheal anesthesia with peripheral IV placement, arterial line placement, and ASA standard monitors.  Lung isolation planned.  The possibility of blood product transfusion was also described in detail.  Risks, benefits, alternatives of this plan were described in detail to the patient, who indicated understanding and agreed to proceed.    Nghia Batres MD

## 2025-07-18 NOTE — ED PROVIDER NOTES
CC: admission for surgery     History provided by: Patient  Limitations to History: None    HPI:    Patient is a 69-year-old male with a PMH of metastatic non-small cell lung cancer, hypertension, hyperlipidemia, depression, anxiety, gout, and recurrent left pneumothorax followed by thoracic surgery who presents to the emergency department for chief complaint of admission for surgery.  Patient instructed by his thoracic surgeon Dr. Grissom to present to the emergency department on Monday for admission for left-sided Pleurx placement and possible lung biopsy.  He was instructed to present to the emergency department immediately for worsening shortness of breath or chest pain.  Subsequently the patient presented to the emergency department for admission for surgery today without worsening shortness of breath or chest pain.    External Records Reviewed: Previous ED records, inpatient records, outpatient records  ???????????????????????????????????????????????????????????????  Triage Vitals:  T 36.2 °C (97.2 °F)  HR 67  /78  RR 16  O2 95 % None (Room air)    Physical Exam  Constitutional:       General: He is awake. He is not in acute distress.     Appearance: He is not ill-appearing, toxic-appearing or diaphoretic.   HENT:      Head: Normocephalic.     Cardiovascular:      Rate and Rhythm: Normal rate and regular rhythm.      Pulses:           Radial pulses are 2+ on the right side and 2+ on the left side.        Dorsalis pedis pulses are 2+ on the right side and 2+ on the left side.      Heart sounds: Normal heart sounds, S1 normal and S2 normal. Heart sounds not distant. No murmur heard.     No friction rub.   Pulmonary:      Effort: Pulmonary effort is normal. No respiratory distress.      Breath sounds: Normal breath sounds.      Comments: Lungs are clear to auscultation no evidence of wheezing or crackles.  Abdominal:      General: Abdomen is flat. There is no distension.      Palpations: Abdomen is soft.       Tenderness: There is no abdominal tenderness. There is no guarding or rebound.     Musculoskeletal:      Right lower leg: No edema.      Left lower leg: No edema.     Skin:     General: Skin is warm and dry.      Capillary Refill: Capillary refill takes less than 2 seconds.     Neurological:      Mental Status: He is alert.     Psychiatric:         Behavior: Behavior is cooperative.        ???????????????????????????????????????????????????????????????  ED Course/Treatment/Medical Decision Making    Social Determinants Limiting Care:  None identified    ED Course:  Diagnoses as of 07/20/25 1555   Pneumothorax   Pneumothorax on left       MDM:  Patient is a 69-year-old male with above PMH who presents to the emergency department for chief complaint of admission for surgery.  Upon arrival to the emergency department the patient's vital signs remarkable for hypertension, he is nontoxic-appearing, appears in no acute distress.  Upon evaluation the patient has not had worsening shortness of breath or chest pain.  Thoracic surgery has been consulted for evaluation.  Preoperative laboratory studies and orders have been entered by thoracic surgery.  He will be admitted to their service in stable condition for operative management.    Impression:  Pneumothorax of left lung    Disposition:  Admit to thoracic surgery    Luis F Hdz DO   Emergency Medicine, PGY-3       Procedures ? SmartLinks last updated 7/17/2025 8:42 PM          Luis F Hdz DO  Resident  07/20/25 [id number]

## 2025-07-18 NOTE — BRIEF OP NOTE
Date: 2025 - 2025  OR Location: [facility] OR    Name: Fortunato Romano, : 1955, Age: 69 y.o., MRN: [id number], Sex: male    Diagnosis  Pre-op Diagnosis      * Pneumothorax on left [J93.9] Post-op Diagnosis     * Pneumothorax on left [J93.9]     Procedures  INSERTION PLEURAL CATHETER  05578 - MD INSERTION INDWELLING TUNNELED PLEURAL CATHETER    VIDEO-ASSISTED THORACOSCOPY, POSSIBLE PLEURAL BIOPSY  90755 - MD THORSC DX LUNGS/PERICAR/MED/PLEURAL SPACE W/O BX      Surgeons      * Sagar Grissom - Primary    Resident/Fellow/Other Assistant:  Surgeons and Role:     * Casandra Bourgeois MD - Fellow    Staff:   Circulator: Casey  Circulator: Alba Greene Person: Vicki Greene Person: Galilea    Anesthesia Staff: Anesthesiologist: Nghia Bartes MD  C-AA: LEONID Kat  Anesthesia Resident: Dalton Rodriguez MD    Procedure Summary  Anesthesia: General  ASA: III  Estimated Blood Loss: 100mL  Intra-op Medications:   Administrations occurring from 0915 to 1200 on 25:   Medication Name Total Dose   sodium chloride 0.9 % irrigation solution 1,000 mL   BUPivacaine-EPINEPHrine (Marcaine w/EPI) 0.25 %-1:200,000 injection 30 mL   dexAMETHasone (Decadron) 4 mg/mL 4 mg   fentaNYL PF 0.05 mg/mL 100 mcg   glycopyrrolate (Robinul) injection 0.2 mg   HYDROmorphone (Dilaudid) 1 mg/mL injection 0.5 mg   LR bolus Cannot be calculated   lidocaine (Xylocaine) injection 2 % 50 mg   midazolam (Versed) 1 mg/1 mL 1 mg   nitroglycerin 1 mg in 10 mL D5W IV bolus 350 mcg   ondansetron 2 mg/mL 4 mg   phenylephrine 40 mcg/mL syringe 10 mL 240 mcg   propofol (Diprivan) injection 10 mg/mL 200 mg   rocuronium (ZeMuron) 50 mg/5 mL injection 70 mg   succinylcholine 20 mg/mL VIAL 120 mg   vasopressin (Vasostrict) 1 unit/mL in NS IV bolus 1 Units   ceFAZolin (Ancef) 2 g in dextrose (iso)  mL 2 g              Anesthesia Record               Intraprocedure I/O Totals       None           Specimen:   ID Type Source Tests  Collected by Time   1 : pleura biopsy Tissue PLEURA BIOPSY LEFT SURGICAL PATHOLOGY EXAM Sagar Grissom MD 7/18/2025 1032      Findings: No obvious AL on wtaer submersion with valsalva. Apical pleurectomy completed with two Cts (1- posterior, 2 - basilar prateek) left in chest to -20 suction.    Complications:  None; patient tolerated the procedure well.     Disposition: PACU - hemodynamically stable.  Condition: stable  Specimens Collected:   ID Type Source Tests Collected by Time   1 : pleura biopsy Tissue PLEURA BIOPSY LEFT SURGICAL PATHOLOGY EXAM Sagar Grissom MD 7/18/2025 1032     Attending Attestation: I was present and scrubbed for the entire procedure.    Sagar Grissom  Phone Number: [phone]

## 2025-07-18 NOTE — ANESTHESIA PROCEDURE NOTES
Airway  Date/Time: 7/18/2025 9:43 AM  Reason: elective    Airway not difficult    Staffing  Performed: resident   Authorized by: Nghia Batres MD    Performed by: Dalton Rodriguez MD  Patient location during procedure: OR    Patient Condition  Indications for airway management: anesthesia and airway protection  Patient position: sniffing  Planned trial extubation  Sedation level: deep     Final Airway Details   Preoxygenated: yes  Final airway type: endotracheal airway  Successful airway: ETT - double lumen left  Cuffed: yes   Successful intubation technique: video laryngoscopy  Adjuncts used in placement: intubating stylet  Endotracheal tube insertion site: oral  ETT DL size (fr): 39  Cormack-Lehane Classification: grade I - full view of glottis  Placement verified by: chest auscultation and capnometry   Ventilation between attempts: none  Number of attempts at approach: 1

## 2025-07-18 NOTE — ANESTHESIA PROCEDURE NOTES
Arterial Line:    Date/Time: 7/18/2025 9:52 AM    Staffing  Performed: resident   Authorized by: Nghia Batres MD    Performed by: Dalton Rodriguez MD    An arterial line was placed. Procedure performed using surface landmarks.in the OR for the following indication(s): continuous blood pressure monitoring and blood sampling needed.    A 20 gauge (size), 12 cm (length), Arrow (type) catheter was placed into the Right radial artery, secured by Tegaderm,   Seldinger technique used.  Events:  patient tolerated procedure well with no complications.

## 2025-07-18 NOTE — PROGRESS NOTES
07/18/25 1649   Discharge Planning   Living Arrangements Spouse/significant other   Support Systems Spouse/significant other   Assistance Needed Oxygen   Type of Residence Private residence   Number of Stairs to Enter Residence 8   Number of Stairs Within Residence 0   Do you have animals or pets at home? No   Who is requesting discharge planning? Provider   Home or Post Acute Services None   Expected Discharge Disposition Home   Does the patient need discharge transport arranged? No   Financial Resource Strain   How hard is it for you to pay for the very basics like food, housing, medical care, and heating? Not hard   Housing Stability   In the last 12 months, was there a time when you were not able to pay the mortgage or rent on time? N   In the past 12 months, how many times have you moved where you were living? 0   At any time in the past 12 months, were you homeless or living in a shelter (including now)? N   Transportation Needs   In the past 12 months, has lack of transportation kept you from medical appointments or from getting medications? no   In the past 12 months, has lack of transportation kept you from meetings, work, or from getting things needed for daily living? No     Met with patient to introduce myself, role and discuss discharge planning.  Patient lives with his spouse.  Patient is independent in all adl's.  Patient denies any recent falls.  Patient requires no assist devices for mobility.  Patient denies active home care, but is agreeable to home care if required.  Patient feel safe at home and denies any issues with making follow up appointments or getting his medications.  Patient has expressed no questions and no social work concerns.  Family will transport home.  Patient is oxygen dependent unable provide DME.  Wife will bring a portable tank in if required.  Inpatient  Insurance:  Medicare Advantage  PCP:  Lin Barbosa MD  Pharmacy:  [facility]  Home Care:  N/A  DME:  Oxygen?  Supplier  Drives  Demographics verified  Family will transport

## 2025-07-18 NOTE — PROGRESS NOTES
Pharmacy Medication History Review    Fortunato Romano is a 69 y.o. male admitted for Pneumothorax. Pharmacy reviewed the patient's zlhss-hc-upyhtgenf medications and allergies for accuracy.    Medications ADDED:  None  Medications CHANGED:  Allopurinol 300 mg tablet - updated to patient taking differently : daily PRN  Medications REMOVED / PATIENT NOT TAKING:   Docusate Sodium 100 mg capsule - patient not taking   Protonix 20 mg EC tablet - removed  Rosuvastatin 5 mg tablet - patient not taking    The list below reflects the updated PTA list.   Prior to Admission Medications   Prescriptions Last Dose Informant   PARoxetine (Paxil) 20 mg tablet 7/17/2025 Self   Sig: Take 1 tablet (20 mg) by mouth once daily in the morning. Take before meals.   UNABLE TO FIND 7/17/2025 Self   Sig: Take 1 capsule by mouth once daily. Med Name: Prevagen   acetaminophen (Tylenol) 325 mg tablet Past Month Self   Sig: Take 2 tablets (650 mg) by mouth every 6 hours if needed for mild pain (1 - 3).   allopurinol (Zyloprim) 300 mg tablet 7/17/2025 Self   Sig: Take 1 tablet (300 mg) by mouth once daily.   Patient taking differently: Take 1 tablet (300 mg) by mouth once daily as needed.   docusate sodium (Colace) 100 mg capsule Not Taking Self   Sig: Take 1 capsule (100 mg) by mouth 2 times a day.   Patient not taking: Reported on 7/17/2025   fluticasone-umeclidin-vilanter (Trelegy Ellipta) 100-62.5-25 mcg blister with device 7/17/2025 Self   Sig: Inhale 1 puff once daily.   gabapentin (Neurontin) 100 mg capsule 7/17/2025 Self   Sig: Take 1 capsule (100 mg) by mouth 2 times a day.   mv-min/folic/K1/lycopen/lutein (MEN 50 PLUS MULTIVITAMIN ORAL) 7/17/2025 Self   Sig: Take 1 tablet by mouth once daily.   olmesartan (BENIcar) 40 mg tablet 7/17/2025 Self   Sig: Take 1 tablet (40 mg) by mouth once daily.   osimertinib (Tagrisso) 80 mg tablet 7/17/2025 Self   Sig: Take 1 tablet (80 mg total) by mouth once daily.   rosuvastatin (Crestor) 5 mg tablet  "Not Taking Self   Sig: Take 1 tablet (5 mg) by mouth once daily.   Patient not taking: Reported on 7/17/2025      Facility-Administered Medications: None        The list below reflects the updated allergy list. Please review each documented allergy for additional clarification and justification.  Allergies  Reviewed by Camille Faith on 7/17/2025        Severity Reactions Comments    Levonorgestrel-ethinyl Estrad Not Specified Unknown     Pollen Extracts Not Specified Unknown             Patient declines M2B at discharge.     Sources:   [facility] - 06/20/2025 Gabapentin 100 mg capsule 60 # / 30 days                     - 04/08/2025 Gabapentin 100 mg capsule 60 # / 30 days   Patient interview  Epic medication dispense hx report    chart review    admission med rec grid   06/05/2025 Discharge Summary with Claudette Kumari PA-C   07/03/2025 Telephone Encounter with AILEEN Cobos    Additional Comments:  Patient states he is not taking Rosuvastatin 5 mg tablets. Per patient he was instructed by providers that he does not have to take this medication , however per chart review and 07/03 telephone encounter note this medication was encouraged to be taken.    Patient reports taking Allopurinol 300 mg tablets differently than prescribed. Rx sig states for patient to take 1 tablet po once daily however patient reports taking this medication once daily on an PRN basis.       Patient is a good historian.           Camille Faith  Pharmacy Technician  07/17/25     Secure Chat preferred   If no response call [phone] or MyVR \"Med Rec\"   "

## 2025-07-18 NOTE — CARE PLAN
The patient's goals for the shift include        Problem: Pain - Adult  Goal: Verbalizes/displays adequate comfort level or baseline comfort level  Outcome: Progressing     Problem: Safety - Adult  Goal: Free from fall injury  Outcome: Progressing     Problem: Nutrition  Goal: Nutrient intake appropriate for maintaining nutritional needs  Outcome: Progressing

## 2025-07-19 ENCOUNTER — APPOINTMENT (OUTPATIENT)
Dept: RADIOLOGY | Facility: HOSPITAL | Age: 70
DRG: 164 | End: 2025-07-19
Payer: MEDICARE

## 2025-07-19 LAB
ALBUMIN SERPL BCP-MCNC: 3.7 G/DL (ref 3.4–5)
ANION GAP SERPL CALC-SCNC: 11 MMOL/L (ref 10–20)
BUN SERPL-MCNC: 16 MG/DL (ref 6–23)
CALCIUM SERPL-MCNC: 8.5 MG/DL (ref 8.6–10.6)
CHLORIDE SERPL-SCNC: 105 MMOL/L (ref 98–107)
CO2 SERPL-SCNC: 30 MMOL/L (ref 21–32)
CREAT SERPL-MCNC: 1.19 MG/DL (ref 0.5–1.3)
EGFRCR SERPLBLD CKD-EPI 2021: 65 ML/MIN/1.73M*2
ERYTHROCYTE [DISTWIDTH] IN BLOOD BY AUTOMATED COUNT: 13.5 % (ref 11.5–14.5)
GLUCOSE SERPL-MCNC: 135 MG/DL (ref 74–99)
HCT VFR BLD AUTO: 43.3 % (ref 41–52)
HGB BLD-MCNC: 13.6 G/DL (ref 13.5–17.5)
MAGNESIUM SERPL-MCNC: 2.28 MG/DL (ref 1.6–2.4)
MCH RBC QN AUTO: 29.7 PG (ref 26–34)
MCHC RBC AUTO-ENTMCNC: 31.4 G/DL (ref 32–36)
MCV RBC AUTO: 95 FL (ref 80–100)
NRBC BLD-RTO: 0 /100 WBCS (ref 0–0)
PHOSPHATE SERPL-MCNC: 2.4 MG/DL (ref 2.5–4.9)
PLATELET # BLD AUTO: 197 X10*3/UL (ref 150–450)
POTASSIUM SERPL-SCNC: 3.6 MMOL/L (ref 3.5–5.3)
RBC # BLD AUTO: 4.58 X10*6/UL (ref 4.5–5.9)
SODIUM SERPL-SCNC: 142 MMOL/L (ref 136–145)
WBC # BLD AUTO: 7.8 X10*3/UL (ref 4.4–11.3)

## 2025-07-19 PROCEDURE — 85027 COMPLETE CBC AUTOMATED: CPT

## 2025-07-19 PROCEDURE — 80069 RENAL FUNCTION PANEL: CPT

## 2025-07-19 PROCEDURE — 2500000004 HC RX 250 GENERAL PHARMACY W/ HCPCS (ALT 636 FOR OP/ED): Performed by: STUDENT IN AN ORGANIZED HEALTH CARE EDUCATION/TRAINING PROGRAM

## 2025-07-19 PROCEDURE — 71045 X-RAY EXAM CHEST 1 VIEW: CPT

## 2025-07-19 PROCEDURE — 94640 AIRWAY INHALATION TREATMENT: CPT

## 2025-07-19 PROCEDURE — 2500000002 HC RX 250 W HCPCS SELF ADMINISTERED DRUGS (ALT 637 FOR MEDICARE OP, ALT 636 FOR OP/ED): Performed by: STUDENT IN AN ORGANIZED HEALTH CARE EDUCATION/TRAINING PROGRAM

## 2025-07-19 PROCEDURE — 83735 ASSAY OF MAGNESIUM: CPT

## 2025-07-19 PROCEDURE — 36415 COLL VENOUS BLD VENIPUNCTURE: CPT

## 2025-07-19 PROCEDURE — 1200000002 HC GENERAL ROOM WITH TELEMETRY DAILY

## 2025-07-19 PROCEDURE — 71045 X-RAY EXAM CHEST 1 VIEW: CPT | Performed by: STUDENT IN AN ORGANIZED HEALTH CARE EDUCATION/TRAINING PROGRAM

## 2025-07-19 PROCEDURE — 2500000001 HC RX 250 WO HCPCS SELF ADMINISTERED DRUGS (ALT 637 FOR MEDICARE OP): Performed by: STUDENT IN AN ORGANIZED HEALTH CARE EDUCATION/TRAINING PROGRAM

## 2025-07-19 PROCEDURE — 99024 POSTOP FOLLOW-UP VISIT: CPT | Performed by: THORACIC SURGERY (CARDIOTHORACIC VASCULAR SURGERY)

## 2025-07-19 PROCEDURE — 2500000001 HC RX 250 WO HCPCS SELF ADMINISTERED DRUGS (ALT 637 FOR MEDICARE OP)

## 2025-07-19 RX ORDER — OXYCODONE HYDROCHLORIDE 10 MG/1
10 TABLET ORAL EVERY 4 HOURS PRN
Refills: 0 | Status: DISCONTINUED | OUTPATIENT
Start: 2025-07-19 | End: 2025-07-21 | Stop reason: HOSPADM

## 2025-07-19 RX ORDER — SODIUM,POTASSIUM PHOSPHATES 280-250MG
1 POWDER IN PACKET (EA) ORAL 4 TIMES DAILY
Status: COMPLETED | OUTPATIENT
Start: 2025-07-19 | End: 2025-07-19

## 2025-07-19 RX ORDER — OXYCODONE HYDROCHLORIDE 5 MG/1
5 TABLET ORAL EVERY 4 HOURS PRN
Refills: 0 | Status: DISCONTINUED | OUTPATIENT
Start: 2025-07-19 | End: 2025-07-21 | Stop reason: HOSPADM

## 2025-07-19 RX ADMIN — PANTOPRAZOLE SODIUM 20 MG: 20 TABLET, DELAYED RELEASE ORAL at 09:34

## 2025-07-19 RX ADMIN — TIOTROPIUM BROMIDE INHALATION SPRAY 2 PUFF: 3.12 SPRAY, METERED RESPIRATORY (INHALATION) at 08:00

## 2025-07-19 RX ADMIN — METHOCARBAMOL 500 MG: 500 TABLET ORAL at 05:59

## 2025-07-19 RX ADMIN — ROSUVASTATIN 5 MG: 5 TABLET, FILM COATED ORAL at 09:34

## 2025-07-19 RX ADMIN — METHOCARBAMOL 500 MG: 500 TABLET ORAL at 14:28

## 2025-07-19 RX ADMIN — ACETAMINOPHEN 650 MG: 325 TABLET ORAL at 11:58

## 2025-07-19 RX ADMIN — FLUTICASONE FUROATE AND VILANTEROL TRIFENATATE 1 PUFF: 100; 25 POWDER RESPIRATORY (INHALATION) at 08:00

## 2025-07-19 RX ADMIN — PAROXETINE HYDROCHLORIDE 20 MG: 20 TABLET, FILM COATED ORAL at 09:34

## 2025-07-19 RX ADMIN — OXYCODONE HYDROCHLORIDE 10 MG: 10 TABLET ORAL at 22:11

## 2025-07-19 RX ADMIN — POTASSIUM & SODIUM PHOSPHATES POWDER PACK 280-160-250 MG 1 PACKET: 280-160-250 PACK at 14:28

## 2025-07-19 RX ADMIN — OXYCODONE HYDROCHLORIDE 5 MG: 5 TABLET ORAL at 17:20

## 2025-07-19 RX ADMIN — POTASSIUM & SODIUM PHOSPHATES POWDER PACK 280-160-250 MG 1 PACKET: 280-160-250 PACK at 17:20

## 2025-07-19 RX ADMIN — GABAPENTIN 100 MG: 100 CAPSULE ORAL at 09:34

## 2025-07-19 RX ADMIN — DOCUSATE SODIUM 100 MG: 100 CAPSULE, LIQUID FILLED ORAL at 09:34

## 2025-07-19 RX ADMIN — ACETAMINOPHEN 650 MG: 325 TABLET ORAL at 05:59

## 2025-07-19 RX ADMIN — ENOXAPARIN SODIUM 40 MG: 100 INJECTION SUBCUTANEOUS at 09:34

## 2025-07-19 RX ADMIN — DOCUSATE SODIUM 100 MG: 100 CAPSULE, LIQUID FILLED ORAL at 21:57

## 2025-07-19 RX ADMIN — OXYCODONE HYDROCHLORIDE 5 MG: 5 TABLET ORAL at 11:58

## 2025-07-19 RX ADMIN — ALLOPURINOL 300 MG: 300 TABLET ORAL at 09:34

## 2025-07-19 RX ADMIN — GABAPENTIN 100 MG: 100 CAPSULE ORAL at 21:57

## 2025-07-19 RX ADMIN — ACETAMINOPHEN 650 MG: 325 TABLET ORAL at 00:00

## 2025-07-19 RX ADMIN — ACETAMINOPHEN 650 MG: 325 TABLET ORAL at 22:00

## 2025-07-19 RX ADMIN — METHOCARBAMOL 500 MG: 500 TABLET ORAL at 21:57

## 2025-07-19 RX ADMIN — ACETAMINOPHEN 650 MG: 325 TABLET ORAL at 17:20

## 2025-07-19 ASSESSMENT — PAIN DESCRIPTION - DESCRIPTORS: DESCRIPTORS: ACHING

## 2025-07-19 ASSESSMENT — COGNITIVE AND FUNCTIONAL STATUS - GENERAL
DAILY ACTIVITIY SCORE: 24
CLIMB 3 TO 5 STEPS WITH RAILING: A LITTLE
MOBILITY SCORE: 23

## 2025-07-19 ASSESSMENT — PAIN SCALES - GENERAL
PAINLEVEL_OUTOF10: 4
PAINLEVEL_OUTOF10: 7
PAINLEVEL_OUTOF10: 6

## 2025-07-19 ASSESSMENT — PAIN - FUNCTIONAL ASSESSMENT
PAIN_FUNCTIONAL_ASSESSMENT: 0-10

## 2025-07-19 NOTE — PROGRESS NOTES
"Fortunato Romano is a 69 y.o. male on day 2 of admission presenting with Pneumothorax.    Subjective   NAEO. Patient states pain mostly controlled with oral medications. Denies N/V/CP/SOB. States feels hungry this morning.     Objective   /81   Pulse 75   Temp 36.9 °C (98.4 °F)   Resp 17   Ht 1.676 m (5' 6\")   Wt 62.2 kg (137 lb 3.2 oz)   SpO2 92%   BMI 22.14 kg/m²   Constitutional: NAD, A&Ox3   Head/Neck: NCAT  Eyes: Anicteric   Cardiovascular: Regular rate and rhythm per peripheral palpation   Respiratory: Breathing comfortably on RA with symmetric chest rise. Two chest tubes to suction; large air leak in posterior tube, small AL in basilar tube increased when posterior tube is clamped. Incisions C/D/I   Abdominal: Soft, non tender, non-distended without rebound or guarding   : Deferred   Ext: MAEx4  Psych: Appropriate mood and affect     Last Recorded Vitals  Blood pressure 138/81, pulse 75, temperature 36.9 °C (98.4 °F), resp. rate 17, height 1.676 m (5' 6\"), weight 62.2 kg (137 lb 3.2 oz), SpO2 92%.  Intake/Output last 3 Shifts:  I/O last 3 completed shifts:  In: 1353.3 (22.1 mL/kg) [I.V.:553.3 (9 mL/kg); IV Piggyback:800]  Out: 1157 (18.9 mL/kg) [Urine:700 (0.3 mL/kg/hr); Chest Tube:457]  Weight: 61.2 kg     Relevant Results  Results for orders placed or performed during the hospital encounter of 07/17/25 (from the past 24 hours)   POCT GLUCOSE   Result Value Ref Range    POCT Glucose 173 (H) 74 - 99 mg/dL     XR chest 1 view  Narrative: Interpreted By:  Ghassan Edwards,   STUDY:  XR CHEST 1 VIEW;  7/19/2025 3:28 am      INDICATION:  Signs/Symptoms:s/p L VATS pleurectomy, CT x 2 placement.      COMPARISON:  Chest radiograph 07/18/2025 and chest CT 05/31/2025.      ACCESSION NUMBER(S):  [id number]      ORDERING CLINICIAN:  ROSELIA GAMBOA      FINDINGS:  AP radiograph of the chest.  Stable positioning of left apical and left basilar chest tubes.  Similar mild subcutaneous emphysema within left lower " lateral chest  wall.      CARDIOMEDIASTINAL SILHOUETTE:  The cardiomediastinal silhouette is stable in size and configuration.  Mild aortic knob calcification.      LUNGS:  There is similar left basilar atelectasis and small left pleural  effusion. Continued improvement in now questionable small left  basilar pneumothorax. Right lung is clear.      ABDOMEN:  No remarkable upper abdominal findings.      BONES:  No acute osseous abnormality.      Impression: 1. Continued improvement in now questionable small left basilar  pneumothorax. Stable positioning of left-sided chest tubes.  2. Unchanged mild left basilar atelectasis with suggestion of small  left pleural effusion.      Signed by: Ghassan Edwards 7/19/2025 9:36 AM  Dictation workstation:   [id number]  XR chest 1 view  Narrative: Interpreted By:  Beltran Schroeder,   STUDY:  XR CHEST 1 VIEW      INDICATION:  Signs/Symptoms:s/p L VATS pleurectomy, CT x 2 placement.      COMPARISON:  July 18      ACCESSION NUMBER(S):  [id number]      ORDERING CLINICIAN:  ROSELIA GAMBOA      FINDINGS:  Interval left-sided chest tube placement with significant improvement  of the pneumothorax.      Right lung clear.      Impression: Interval left-sided chest tube placement with significant improvement  of the pneumothorax.      Signed by: Beltran Schroeder 7/19/2025 9:09 AM  Dictation workstation:   [id number]      Assessment & Plan    Fortunato Romano is a 69 year old male with PMHx significant for metastatic non-small cell lung carcinoma (EGFR positive of HILARIA), HTN, HLD, depression/anxiety, and gout. Patient presenting for recurrent pneumothorax. Hx of spontaneous pneumothorax 8/2024 leading to chest tube and recurrent pneumothoraces since as recently as 6/3/25. Patient with slow worsening of symptoms prompting admission and now s/p L VATS, partial pleurectomy/pleural biopsy.     Plan:  Neuro:   -Tylenol, robaxin, PRN oxycodone for pain   -Continue gabapentin, paroxetine     CV:   -Continue  home rosuvastatin   -Holding home olmesartan    Resp:   -Contnue home budesonide, tiotropium, fluticasone   -Maintain both chest tubes to suction     GI:   -Regular diet   -Continue colace, PPI    Heme:  -Monitor CBC as clinically indicated     MSK:   -Continue home allopurinol     PPX: Lovenox + SCDs     D/w Dr. Harris and Dr. Jaciel Matias MD  PGY-2 Gen Surg  Thoracic Surgery [id number]

## 2025-07-19 NOTE — CARE PLAN
Problem: Pain - Adult  Goal: Verbalizes/displays adequate comfort level or baseline comfort level  Outcome: Progressing     Problem: Safety - Adult  Goal: Free from fall injury  Outcome: Progressing     Problem: Discharge Planning  Goal: Discharge to home or other facility with appropriate resources  Outcome: Progressing     Problem: Chronic Conditions and Co-morbidities  Goal: Patient's chronic conditions and co-morbidity symptoms are monitored and maintained or improved  Outcome: Progressing     Problem: Nutrition  Goal: Nutrient intake appropriate for maintaining nutritional needs  Outcome: Progressing   The patient's goals for the shift include      The clinical goals for the shift include safety

## 2025-07-20 ENCOUNTER — APPOINTMENT (OUTPATIENT)
Dept: RADIOLOGY | Facility: HOSPITAL | Age: 70
DRG: 164 | End: 2025-07-20
Payer: MEDICARE

## 2025-07-20 VITALS
RESPIRATION RATE: 18 BRPM | TEMPERATURE: 97.5 F | WEIGHT: 135.3 LBS | HEIGHT: 66 IN | DIASTOLIC BLOOD PRESSURE: 94 MMHG | OXYGEN SATURATION: 93 % | HEART RATE: 86 BPM | BODY MASS INDEX: 21.74 KG/M2 | SYSTOLIC BLOOD PRESSURE: 150 MMHG

## 2025-07-20 LAB
ALBUMIN SERPL BCP-MCNC: 3.6 G/DL (ref 3.4–5)
ANION GAP SERPL CALC-SCNC: 12 MMOL/L (ref 10–20)
BUN SERPL-MCNC: 16 MG/DL (ref 6–23)
CALCIUM SERPL-MCNC: 8.3 MG/DL (ref 8.6–10.6)
CHLORIDE SERPL-SCNC: 105 MMOL/L (ref 98–107)
CO2 SERPL-SCNC: 27 MMOL/L (ref 21–32)
CREAT SERPL-MCNC: 0.87 MG/DL (ref 0.5–1.3)
EGFRCR SERPLBLD CKD-EPI 2021: >90 ML/MIN/1.73M*2
ERYTHROCYTE [DISTWIDTH] IN BLOOD BY AUTOMATED COUNT: 13.6 % (ref 11.5–14.5)
GLUCOSE SERPL-MCNC: 113 MG/DL (ref 74–99)
HCT VFR BLD AUTO: 37.4 % (ref 41–52)
HGB BLD-MCNC: 12.2 G/DL (ref 13.5–17.5)
MAGNESIUM SERPL-MCNC: 1.96 MG/DL (ref 1.6–2.4)
MCH RBC QN AUTO: 29.3 PG (ref 26–34)
MCHC RBC AUTO-ENTMCNC: 32.6 G/DL (ref 32–36)
MCV RBC AUTO: 90 FL (ref 80–100)
NRBC BLD-RTO: 0 /100 WBCS (ref 0–0)
PHOSPHATE SERPL-MCNC: 2.4 MG/DL (ref 2.5–4.9)
PLATELET # BLD AUTO: 182 X10*3/UL (ref 150–450)
POTASSIUM SERPL-SCNC: 3.5 MMOL/L (ref 3.5–5.3)
RBC # BLD AUTO: 4.16 X10*6/UL (ref 4.5–5.9)
SODIUM SERPL-SCNC: 140 MMOL/L (ref 136–145)
WBC # BLD AUTO: 8.8 X10*3/UL (ref 4.4–11.3)

## 2025-07-20 PROCEDURE — 71045 X-RAY EXAM CHEST 1 VIEW: CPT

## 2025-07-20 PROCEDURE — 2500000001 HC RX 250 WO HCPCS SELF ADMINISTERED DRUGS (ALT 637 FOR MEDICARE OP)

## 2025-07-20 PROCEDURE — 2500000001 HC RX 250 WO HCPCS SELF ADMINISTERED DRUGS (ALT 637 FOR MEDICARE OP): Performed by: STUDENT IN AN ORGANIZED HEALTH CARE EDUCATION/TRAINING PROGRAM

## 2025-07-20 PROCEDURE — 36415 COLL VENOUS BLD VENIPUNCTURE: CPT

## 2025-07-20 PROCEDURE — 1200000002 HC GENERAL ROOM WITH TELEMETRY DAILY

## 2025-07-20 PROCEDURE — 2500000002 HC RX 250 W HCPCS SELF ADMINISTERED DRUGS (ALT 637 FOR MEDICARE OP, ALT 636 FOR OP/ED): Performed by: STUDENT IN AN ORGANIZED HEALTH CARE EDUCATION/TRAINING PROGRAM

## 2025-07-20 PROCEDURE — 71045 X-RAY EXAM CHEST 1 VIEW: CPT | Performed by: STUDENT IN AN ORGANIZED HEALTH CARE EDUCATION/TRAINING PROGRAM

## 2025-07-20 PROCEDURE — 99024 POSTOP FOLLOW-UP VISIT: CPT | Performed by: THORACIC SURGERY (CARDIOTHORACIC VASCULAR SURGERY)

## 2025-07-20 PROCEDURE — 85027 COMPLETE CBC AUTOMATED: CPT

## 2025-07-20 PROCEDURE — 80069 RENAL FUNCTION PANEL: CPT

## 2025-07-20 PROCEDURE — 2500000004 HC RX 250 GENERAL PHARMACY W/ HCPCS (ALT 636 FOR OP/ED): Performed by: STUDENT IN AN ORGANIZED HEALTH CARE EDUCATION/TRAINING PROGRAM

## 2025-07-20 PROCEDURE — 83735 ASSAY OF MAGNESIUM: CPT

## 2025-07-20 RX ORDER — POTASSIUM CHLORIDE 1.5 G/1.58G
40 POWDER, FOR SOLUTION ORAL ONCE
Status: COMPLETED | OUTPATIENT
Start: 2025-07-20 | End: 2025-07-20

## 2025-07-20 RX ORDER — POTASSIUM CHLORIDE 1.5 G/1.58G
40 POWDER, FOR SOLUTION ORAL ONCE
Status: DISCONTINUED | OUTPATIENT
Start: 2025-07-20 | End: 2025-07-20

## 2025-07-20 RX ADMIN — ACETAMINOPHEN 650 MG: 325 TABLET ORAL at 17:23

## 2025-07-20 RX ADMIN — ACETAMINOPHEN 650 MG: 325 TABLET ORAL at 05:07

## 2025-07-20 RX ADMIN — GABAPENTIN 100 MG: 100 CAPSULE ORAL at 20:18

## 2025-07-20 RX ADMIN — DOCUSATE SODIUM 100 MG: 100 CAPSULE, LIQUID FILLED ORAL at 08:44

## 2025-07-20 RX ADMIN — ACETAMINOPHEN 650 MG: 325 TABLET ORAL at 12:04

## 2025-07-20 RX ADMIN — POTASSIUM CHLORIDE 40 MEQ: 1.5 POWDER, FOR SOLUTION ORAL at 08:44

## 2025-07-20 RX ADMIN — TIOTROPIUM BROMIDE INHALATION SPRAY 2 PUFF: 3.12 SPRAY, METERED RESPIRATORY (INHALATION) at 08:46

## 2025-07-20 RX ADMIN — PAROXETINE HYDROCHLORIDE 20 MG: 20 TABLET, FILM COATED ORAL at 08:44

## 2025-07-20 RX ADMIN — ROSUVASTATIN 5 MG: 5 TABLET, FILM COATED ORAL at 08:45

## 2025-07-20 RX ADMIN — OXYCODONE HYDROCHLORIDE 10 MG: 10 TABLET ORAL at 08:44

## 2025-07-20 RX ADMIN — METHOCARBAMOL 500 MG: 500 TABLET ORAL at 05:07

## 2025-07-20 RX ADMIN — BUDESONIDE 0.25 MG: 0.25 INHALANT RESPIRATORY (INHALATION) at 08:45

## 2025-07-20 RX ADMIN — ALLOPURINOL 300 MG: 300 TABLET ORAL at 08:46

## 2025-07-20 RX ADMIN — ACETAMINOPHEN 650 MG: 325 TABLET ORAL at 22:49

## 2025-07-20 RX ADMIN — PANTOPRAZOLE SODIUM 20 MG: 20 TABLET, DELAYED RELEASE ORAL at 08:45

## 2025-07-20 RX ADMIN — FLUTICASONE FUROATE AND VILANTEROL TRIFENATATE 1 PUFF: 100; 25 POWDER RESPIRATORY (INHALATION) at 08:46

## 2025-07-20 RX ADMIN — ENOXAPARIN SODIUM 40 MG: 100 INJECTION SUBCUTANEOUS at 08:44

## 2025-07-20 RX ADMIN — GABAPENTIN 100 MG: 100 CAPSULE ORAL at 08:45

## 2025-07-20 RX ADMIN — METHOCARBAMOL 500 MG: 500 TABLET ORAL at 22:49

## 2025-07-20 RX ADMIN — METHOCARBAMOL 500 MG: 500 TABLET ORAL at 14:32

## 2025-07-20 RX ADMIN — DOCUSATE SODIUM 100 MG: 100 CAPSULE, LIQUID FILLED ORAL at 20:18

## 2025-07-20 ASSESSMENT — COGNITIVE AND FUNCTIONAL STATUS - GENERAL
MOBILITY SCORE: 24
DAILY ACTIVITIY SCORE: 24

## 2025-07-20 ASSESSMENT — PAIN - FUNCTIONAL ASSESSMENT
PAIN_FUNCTIONAL_ASSESSMENT: 0-10

## 2025-07-20 ASSESSMENT — PAIN SCALES - GENERAL
PAINLEVEL_OUTOF10: 7
PAINLEVEL_OUTOF10: 5 - MODERATE PAIN
PAINLEVEL_OUTOF10: 3

## 2025-07-20 NOTE — PROGRESS NOTES
"Fortunato Romano is a 69 y.o. male on day 3 of admission presenting with Pneumothorax.    Subjective   NAEO. States that breathing is worse this AM, required oxygen overnight but is on RA currently. Tolerating diet.     Objective   /78 (BP Location: Right arm, Patient Position: Sitting)   Pulse 107   Temp 37.1 °C (98.8 °F) (Temporal)   Resp 20   Ht 1.676 m (5' 6\")   Wt 61.4 kg (135 lb 4.8 oz)   SpO2 92%   BMI 21.84 kg/m²   Constitutional: NAD, A&Ox3   Head/Neck: NCAT  Eyes: Anicteric   Cardiovascular: Regular rate and rhythm per peripheral palpation   Respiratory: Breathing comfortably on RA with symmetric chest rise. Two chest tubes to suction; large air leak in posterior tube, small AL in basilar tube increased when posterior tube is clamped. Incisions C/D/I   Abdominal: Soft, non tender, non-distended without rebound or guarding   Ext: MAEx4  Psych: Appropriate mood and affect     Last Recorded Vitals  Blood pressure 120/78, pulse 107, temperature 37.1 °C (98.8 °F), temperature source Temporal, resp. rate 20, height 1.676 m (5' 6\"), weight 61.4 kg (135 lb 4.8 oz), SpO2 92%.  Intake/Output last 3 Shifts:  I/O last 3 completed shifts:  In: 840 (13.7 mL/kg) [P.O.:840]  Out: 1898 (30.9 mL/kg) [Urine:1400 (0.6 mL/kg/hr); Chest Tube:498]  Weight: 61.4 kg     Relevant Results  Results for orders placed or performed during the hospital encounter of 07/17/25 (from the past 24 hours)   CBC   Result Value Ref Range    WBC 7.8 4.4 - 11.3 x10*3/uL    nRBC 0.0 0.0 - 0.0 /100 WBCs    RBC 4.58 4.50 - 5.90 x10*6/uL    Hemoglobin 13.6 13.5 - 17.5 g/dL    Hematocrit 43.3 41.0 - 52.0 %    MCV 95 80 - 100 fL    MCH 29.7 26.0 - 34.0 pg    MCHC 31.4 (L) 32.0 - 36.0 g/dL    RDW 13.5 11.5 - 14.5 %    Platelets 197 150 - 450 x10*3/uL   Renal function panel   Result Value Ref Range    Glucose 135 (H) 74 - 99 mg/dL    Sodium 142 136 - 145 mmol/L    Potassium 3.6 3.5 - 5.3 mmol/L    Chloride 105 98 - 107 mmol/L    Bicarbonate 30 " 21 - 32 mmol/L    Anion Gap 11 10 - 20 mmol/L    Urea Nitrogen 16 6 - 23 mg/dL    Creatinine 1.19 0.50 - 1.30 mg/dL    eGFR 65 >60 mL/min/1.73m*2    Calcium 8.5 (L) 8.6 - 10.6 mg/dL    Phosphorus 2.4 (L) 2.5 - 4.9 mg/dL    Albumin 3.7 3.4 - 5.0 g/dL   Magnesium   Result Value Ref Range    Magnesium 2.28 1.60 - 2.40 mg/dL   CBC   Result Value Ref Range    WBC 8.8 4.4 - 11.3 x10*3/uL    nRBC 0.0 0.0 - 0.0 /100 WBCs    RBC 4.16 (L) 4.50 - 5.90 x10*6/uL    Hemoglobin 12.2 (L) 13.5 - 17.5 g/dL    Hematocrit 37.4 (L) 41.0 - 52.0 %    MCV 90 80 - 100 fL    MCH 29.3 26.0 - 34.0 pg    MCHC 32.6 32.0 - 36.0 g/dL    RDW 13.6 11.5 - 14.5 %    Platelets 182 150 - 450 x10*3/uL   Magnesium   Result Value Ref Range    Magnesium 1.96 1.60 - 2.40 mg/dL   Renal Function Panel   Result Value Ref Range    Glucose 113 (H) 74 - 99 mg/dL    Sodium 140 136 - 145 mmol/L    Potassium 3.5 3.5 - 5.3 mmol/L    Chloride 105 98 - 107 mmol/L    Bicarbonate 27 21 - 32 mmol/L    Anion Gap 12 10 - 20 mmol/L    Urea Nitrogen 16 6 - 23 mg/dL    Creatinine 0.87 0.50 - 1.30 mg/dL    eGFR >90 >60 mL/min/1.73m*2    Calcium 8.3 (L) 8.6 - 10.6 mg/dL    Phosphorus 2.4 (L) 2.5 - 4.9 mg/dL    Albumin 3.6 3.4 - 5.0 g/dL     XR chest 1 view  Narrative: Interpreted By:  Ghassan Edwards,   STUDY:  XR CHEST 1 VIEW;  7/19/2025 3:28 am      INDICATION:  Signs/Symptoms:s/p L VATS pleurectomy, CT x 2 placement.      COMPARISON:  Chest radiograph 07/18/2025 and chest CT 05/31/2025.      ACCESSION NUMBER(S):  [id number]      ORDERING CLINICIAN:  ROSELIA GAMBOA      FINDINGS:  AP radiograph of the chest.  Stable positioning of left apical and left basilar chest tubes.  Similar mild subcutaneous emphysema within left lower lateral chest  wall.      CARDIOMEDIASTINAL SILHOUETTE:  The cardiomediastinal silhouette is stable in size and configuration.  Mild aortic knob calcification.      LUNGS:  There is similar left basilar atelectasis and small left pleural  effusion.  Continued improvement in now questionable small left  basilar pneumothorax. Right lung is clear.      ABDOMEN:  No remarkable upper abdominal findings.      BONES:  No acute osseous abnormality.      Impression: 1. Continued improvement in now questionable small left basilar  pneumothorax. Stable positioning of left-sided chest tubes.  2. Unchanged mild left basilar atelectasis with suggestion of small  left pleural effusion.      Signed by: Ghassan Edwards 7/19/2025 9:36 AM  Dictation workstation:   [id number]  XR chest 1 view  Narrative: Interpreted By:  Beltran Schroeder,   STUDY:  XR CHEST 1 VIEW      INDICATION:  Signs/Symptoms:s/p L VATS pleurectomy, CT x 2 placement.      COMPARISON:  July 18      ACCESSION NUMBER(S):  [id number]      ORDERING CLINICIAN:  ROSELIA GAMBOA      FINDINGS:  Interval left-sided chest tube placement with significant improvement  of the pneumothorax.      Right lung clear.      Impression: Interval left-sided chest tube placement with significant improvement  of the pneumothorax.      Signed by: Beltran Schroeder 7/19/2025 9:09 AM  Dictation workstation:   [id number]    CXR 7/20: Reviewed, unremarkable. Chest tubes in place in L hemithorax with no signs of pneumothorax.   Assessment & Plan    Fortunato Romano is a 69 year old male with PMHx significant for metastatic non-small cell lung carcinoma (EGFR positive of HILARIA), HTN, HLD, depression/anxiety, and gout. Patient presenting for recurrent pneumothorax. Hx of spontaneous pneumothorax 8/2024 leading to chest tube and recurrent pneumothoraces since as recently as 6/3/25. Patient with slow worsening of symptoms prompting admission and now s/p L VATS, partial pleurectomy/pleural biopsy.     Plan:  Neuro:   -Tylenol, robaxin, PRN oxycodone for pain   -Continue gabapentin, paroxetine     CV:   -Continue home rosuvastatin   -Holding home olmesartan    Resp:   -Contnue home budesonide, tiotropium, fluticasone   -Chest tubes to waterseal     GI:    -Regular diet   -Continue colace, PPI    Heme:  -Monitor CBC as clinically indicated     MSK:   -Continue home allopurinol     PPX: Lovenox + SCDs     D/w Dr. Harris and Dr. Grissom.    Gege Bergman MD  PGY-2 Gen Surg  Thoracic Surgery [id number]

## 2025-07-21 ENCOUNTER — PHARMACY VISIT (OUTPATIENT)
Dept: PHARMACY | Facility: CLINIC | Age: 70
End: 2025-07-21
Payer: MEDICARE

## 2025-07-21 ENCOUNTER — APPOINTMENT (OUTPATIENT)
Dept: RADIOLOGY | Facility: HOSPITAL | Age: 70
DRG: 164 | End: 2025-07-21
Payer: MEDICARE

## 2025-07-21 VITALS
SYSTOLIC BLOOD PRESSURE: 143 MMHG | DIASTOLIC BLOOD PRESSURE: 82 MMHG | HEART RATE: 80 BPM | RESPIRATION RATE: 18 BRPM | HEIGHT: 66 IN | WEIGHT: 135.5 LBS | TEMPERATURE: 97.7 F | BODY MASS INDEX: 21.78 KG/M2 | OXYGEN SATURATION: 95 %

## 2025-07-21 LAB
ALBUMIN SERPL BCP-MCNC: 3.7 G/DL (ref 3.4–5)
ANION GAP SERPL CALC-SCNC: 12 MMOL/L (ref 10–20)
BUN SERPL-MCNC: 13 MG/DL (ref 6–23)
CALCIUM SERPL-MCNC: 8.6 MG/DL (ref 8.6–10.6)
CHLORIDE SERPL-SCNC: 107 MMOL/L (ref 98–107)
CO2 SERPL-SCNC: 25 MMOL/L (ref 21–32)
CREAT SERPL-MCNC: 0.81 MG/DL (ref 0.5–1.3)
EGFRCR SERPLBLD CKD-EPI 2021: >90 ML/MIN/1.73M*2
ERYTHROCYTE [DISTWIDTH] IN BLOOD BY AUTOMATED COUNT: 13.7 % (ref 11.5–14.5)
GLUCOSE SERPL-MCNC: 97 MG/DL (ref 74–99)
HCT VFR BLD AUTO: 38.4 % (ref 41–52)
HGB BLD-MCNC: 12 G/DL (ref 13.5–17.5)
MAGNESIUM SERPL-MCNC: 2.14 MG/DL (ref 1.6–2.4)
MCH RBC QN AUTO: 29.1 PG (ref 26–34)
MCHC RBC AUTO-ENTMCNC: 31.3 G/DL (ref 32–36)
MCV RBC AUTO: 93 FL (ref 80–100)
NRBC BLD-RTO: 0 /100 WBCS (ref 0–0)
PHOSPHATE SERPL-MCNC: 2.6 MG/DL (ref 2.5–4.9)
PLATELET # BLD AUTO: 186 X10*3/UL (ref 150–450)
POTASSIUM SERPL-SCNC: 3.8 MMOL/L (ref 3.5–5.3)
RBC # BLD AUTO: 4.12 X10*6/UL (ref 4.5–5.9)
SODIUM SERPL-SCNC: 140 MMOL/L (ref 136–145)
WBC # BLD AUTO: 6.3 X10*3/UL (ref 4.4–11.3)

## 2025-07-21 PROCEDURE — 71045 X-RAY EXAM CHEST 1 VIEW: CPT

## 2025-07-21 PROCEDURE — 94640 AIRWAY INHALATION TREATMENT: CPT

## 2025-07-21 PROCEDURE — 71045 X-RAY EXAM CHEST 1 VIEW: CPT | Performed by: STUDENT IN AN ORGANIZED HEALTH CARE EDUCATION/TRAINING PROGRAM

## 2025-07-21 PROCEDURE — 2500000001 HC RX 250 WO HCPCS SELF ADMINISTERED DRUGS (ALT 637 FOR MEDICARE OP): Performed by: STUDENT IN AN ORGANIZED HEALTH CARE EDUCATION/TRAINING PROGRAM

## 2025-07-21 PROCEDURE — 71045 X-RAY EXAM CHEST 1 VIEW: CPT | Performed by: RADIOLOGY

## 2025-07-21 PROCEDURE — 2500000002 HC RX 250 W HCPCS SELF ADMINISTERED DRUGS (ALT 637 FOR MEDICARE OP, ALT 636 FOR OP/ED): Performed by: STUDENT IN AN ORGANIZED HEALTH CARE EDUCATION/TRAINING PROGRAM

## 2025-07-21 PROCEDURE — 83735 ASSAY OF MAGNESIUM: CPT

## 2025-07-21 PROCEDURE — 80069 RENAL FUNCTION PANEL: CPT

## 2025-07-21 PROCEDURE — 2500000004 HC RX 250 GENERAL PHARMACY W/ HCPCS (ALT 636 FOR OP/ED): Performed by: STUDENT IN AN ORGANIZED HEALTH CARE EDUCATION/TRAINING PROGRAM

## 2025-07-21 PROCEDURE — 99238 HOSP IP/OBS DSCHRG MGMT 30/<: CPT | Performed by: NURSE PRACTITIONER

## 2025-07-21 PROCEDURE — 2500000001 HC RX 250 WO HCPCS SELF ADMINISTERED DRUGS (ALT 637 FOR MEDICARE OP): Performed by: NURSE PRACTITIONER

## 2025-07-21 PROCEDURE — 2500000001 HC RX 250 WO HCPCS SELF ADMINISTERED DRUGS (ALT 637 FOR MEDICARE OP)

## 2025-07-21 PROCEDURE — 36415 COLL VENOUS BLD VENIPUNCTURE: CPT

## 2025-07-21 PROCEDURE — RXMED WILLOW AMBULATORY MEDICATION CHARGE

## 2025-07-21 PROCEDURE — 85027 COMPLETE CBC AUTOMATED: CPT

## 2025-07-21 RX ORDER — OXYCODONE HYDROCHLORIDE 5 MG/1
5 TABLET ORAL EVERY 6 HOURS PRN
Qty: 21 TABLET | Refills: 0 | Status: SHIPPED | OUTPATIENT
Start: 2025-07-21 | End: 2025-08-01 | Stop reason: ALTCHOICE

## 2025-07-21 RX ORDER — VALSARTAN 40 MG/1
40 TABLET ORAL DAILY
Status: DISCONTINUED | OUTPATIENT
Start: 2025-07-21 | End: 2025-07-21 | Stop reason: HOSPADM

## 2025-07-21 RX ADMIN — ALLOPURINOL 300 MG: 300 TABLET ORAL at 09:19

## 2025-07-21 RX ADMIN — METHOCARBAMOL 500 MG: 500 TABLET ORAL at 13:57

## 2025-07-21 RX ADMIN — OXYCODONE HYDROCHLORIDE 5 MG: 5 TABLET ORAL at 12:01

## 2025-07-21 RX ADMIN — ACETAMINOPHEN 650 MG: 325 TABLET ORAL at 12:01

## 2025-07-21 RX ADMIN — OXYCODONE HYDROCHLORIDE 5 MG: 5 TABLET ORAL at 07:19

## 2025-07-21 RX ADMIN — BUDESONIDE 0.25 MG: 0.25 INHALANT RESPIRATORY (INHALATION) at 10:01

## 2025-07-21 RX ADMIN — FLUTICASONE FUROATE AND VILANTEROL TRIFENATATE 1 PUFF: 100; 25 POWDER RESPIRATORY (INHALATION) at 10:01

## 2025-07-21 RX ADMIN — ENOXAPARIN SODIUM 40 MG: 100 INJECTION SUBCUTANEOUS at 09:19

## 2025-07-21 RX ADMIN — DOCUSATE SODIUM 100 MG: 100 CAPSULE, LIQUID FILLED ORAL at 09:19

## 2025-07-21 RX ADMIN — GABAPENTIN 100 MG: 100 CAPSULE ORAL at 09:19

## 2025-07-21 RX ADMIN — PAROXETINE HYDROCHLORIDE 20 MG: 20 TABLET, FILM COATED ORAL at 07:17

## 2025-07-21 RX ADMIN — VALSARTAN 40 MG: 40 TABLET, FILM COATED ORAL at 13:57

## 2025-07-21 RX ADMIN — ACETAMINOPHEN 650 MG: 325 TABLET ORAL at 06:35

## 2025-07-21 RX ADMIN — TIOTROPIUM BROMIDE INHALATION SPRAY 2 PUFF: 3.12 SPRAY, METERED RESPIRATORY (INHALATION) at 10:01

## 2025-07-21 RX ADMIN — METHOCARBAMOL 500 MG: 500 TABLET ORAL at 06:35

## 2025-07-21 RX ADMIN — ROSUVASTATIN 5 MG: 5 TABLET, FILM COATED ORAL at 09:19

## 2025-07-21 RX ADMIN — PANTOPRAZOLE SODIUM 20 MG: 20 TABLET, DELAYED RELEASE ORAL at 09:19

## 2025-07-21 ASSESSMENT — PAIN SCALES - GENERAL
PAINLEVEL_OUTOF10: 2
PAINLEVEL_OUTOF10: 6
PAINLEVEL_OUTOF10: 6
PAINLEVEL_OUTOF10: 2

## 2025-07-21 ASSESSMENT — PAIN - FUNCTIONAL ASSESSMENT
PAIN_FUNCTIONAL_ASSESSMENT: 0-10

## 2025-07-21 NOTE — PROGRESS NOTES
"Fortunato Romano is a 69 y.o. male on day 4 of admission presenting with Pneumothorax.    Subjective   No significant events overnight  No appreciable air leak no ivet on both left chest tubes this am   Oxygenating well on room air  Pain reasonably well controlled     Objective     Physical Exam  Constitutional:       Appearance: Normal appearance.      Comments: Elderly male resting in the chair, in NAD, pleasant and cooperative    HENT:      Head: Normocephalic and atraumatic.      Mouth/Throat:      Mouth: Mucous membranes are moist.     Cardiovascular:      Rate and Rhythm: Normal rate and regular rhythm.      Comments: Telemetry with NSR   Pulmonary:      Comments: Oxygenating well on room air  Left chest tubes (basilar and posterior) with SS fluid and NO air leak , maintained to WS   Abdominal:      Comments: Soft, flat, not tender, normoactive BS, + BM   Genitourinary:     Comments: Voiding per bathroom     Musculoskeletal:      Cervical back: Neck supple.      Comments: No edema or calf tenderness      Skin:     Comments: Chest tube site with small SS drainage, small redness surrounding incision      Neurological:      General: No focal deficit present.      Mental Status: He is alert and oriented to person, place, and time.     Psychiatric:         Mood and Affect: Mood normal.         Behavior: Behavior normal.         Last Recorded Vitals  Blood pressure 156/84, pulse 73, temperature 36.7 °C (98.1 °F), temperature source Temporal, resp. rate 18, height 1.676 m (5' 6\"), weight 61.5 kg (135 lb 8 oz), SpO2 98%.  Intake/Output last 3 Shifts:  I/O last 3 completed shifts:  In: 760 (12.4 mL/kg) [P.O.:760]  Out: 1665 (27.1 mL/kg) [Urine:1300 (0.6 mL/kg/hr); Chest Tube:365]  Weight: 61.5 kg     Relevant Results  Scheduled medications  Scheduled Medications[1]  Continuous medications  Continuous Medications[2]  PRN medications  PRN Medications[3]    Results for orders placed or performed during the hospital encounter " of 07/17/25 (from the past 24 hours)   CBC   Result Value Ref Range    WBC 6.3 4.4 - 11.3 x10*3/uL    nRBC 0.0 0.0 - 0.0 /100 WBCs    RBC 4.12 (L) 4.50 - 5.90 x10*6/uL    Hemoglobin 12.0 (L) 13.5 - 17.5 g/dL    Hematocrit 38.4 (L) 41.0 - 52.0 %    MCV 93 80 - 100 fL    MCH 29.1 26.0 - 34.0 pg    MCHC 31.3 (L) 32.0 - 36.0 g/dL    RDW 13.7 11.5 - 14.5 %    Platelets 186 150 - 450 x10*3/uL   Magnesium   Result Value Ref Range    Magnesium 2.14 1.60 - 2.40 mg/dL   Renal Function Panel   Result Value Ref Range    Glucose 97 74 - 99 mg/dL    Sodium 140 136 - 145 mmol/L    Potassium 3.8 3.5 - 5.3 mmol/L    Chloride 107 98 - 107 mmol/L    Bicarbonate 25 21 - 32 mmol/L    Anion Gap 12 10 - 20 mmol/L    Urea Nitrogen 13 6 - 23 mg/dL    Creatinine 0.81 0.50 - 1.30 mg/dL    eGFR >90 >60 mL/min/1.73m*2    Calcium 8.6 8.6 - 10.6 mg/dL    Phosphorus 2.6 2.5 - 4.9 mg/dL    Albumin 3.7 3.4 - 5.0 g/dL       XR chest 1 view 07/21/2025    Narrative  Interpreted By:  Jarod Amanda,  STUDY:  XR CHEST 1 VIEW;  7/21/2025 9:49 am    INDICATION:  Signs/Symptoms:pneumothorax.    COMPARISON:  3:12 a.m..    ACCESSION NUMBER(S):  [id number]    ORDERING CLINICIAN:  AYANNA STANFORD    Impression  No pneumothorax seen on today's exam.  Stable left apical chest tube placement.  Cardiac silhouette is enlarged. Aorta is tortuous. Pulmonary vessels  are prominent. Patchy airspace opacity involving the left lower lobe,  likely atelectasis, slightly improved. Increased lung markings of the  remaining lungs. No pleural effusions seen.    MACRO:  None    Signed by: Jarod Amanda 7/21/2025 10:07 AM  Dictation workstation:   [id number]      Assessment & Plan    Fortunato Romano is a 69 y.o. 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 He was reportedly diagnosed with his lung cancer in 2024 and the decision was to treat with oral medication (Tagrisso).  Patient  presented for recurrent pneumothorax. Hx of spontaneous pneumothorax 8/2024 leading to chest tube and recurrent pneumothoraces since as recently as 6/3/25. Patient with slow worsening of symptoms prompting admission and now s/p L VATS, partial pleurectomy/pleural biopsy on 7/18/25 by Dr. Grissom.   Post operatively CT's with ongoing air leak.  7/20 Ct's placed to WS  7/21 No air leak noted        Neurology: incisional discomfort, hx of anxiety/depression   -Good pain control with oral Acetaminophen and Oxycodone as needed, continue   -Lidocaine patches if needed  -Out of bed to the chair throughout the day  -Encourage ambulation as tolerated  -Continue home dose of Neurontin 100 mg BID and Paxil 20 mg QD     Cardiovascular: hx of HTN on Olmesartan 40 mg at home   Normotensive, NSR on telemetry   -Continue telemetry  -Vital signs per unit routine  -Continue home Olmesartan (hospital substitute) , patient not taking home Crestor   -Replace electrolytes as needed for K >4, Mg >2      Pulmonology: hydropneumothorax , chest tube management, hx of 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 treated with Tagrisso, s/p partial pleurectomy   -Encourage incentive spirometer use every hour  -Continue pulmonary hygiene  -Oxygenating well on room air   -Chest tube: basilar CT removed on am rounds, posterior CT with ongoing clamp trial, if negative will remove later today   -Obtain daily CXR  - Continue with home regimen of Tagrisso, patient will follow up wit primary oncologist at [facility]      Gastrointestinal:   - Tolerating regular diet   - Continue  home daily PPI      Genitourinary:   -Voiding spontaneously   -Continue to monitor daily electrolytes with routine BMPs, creatinine 0.81 today   -Adequate urine output     Infectious Disease:   Afebrile and no leukocytosis   -Continue to trend daily temperatures and WBC count to monitor for signs of post-operative infection   -Monitor for  signs of infection   -Completed tita op ATB      Hematology: see above   -Monitor for signs of acute blood loss  -Trend CBCs      Endocrine: no issues      DVT Prophylaxis:   -Continue subcutaneous heparin and SCDs, early ambulation  -OOB to chair throughout the day, encourage ambulation as tolerated     Disposition:  - Plan to discharge patient home today if stable post pull images, no home going needs identified  - Patient to follow up with Dr. Grissom with CXR and primary oncologist on discharge       Patient evaluated by this provider and discussed with attending surgeon Dr. Grissom.      Margarita Caceres, REYNA-CNP  Thoracic and Esophageal Surgery [id number]         [1] acetaminophen, 650 mg, oral, q6h  allopurinol, 300 mg, oral, Daily  budesonide, 0.25 mg, nebulization, Daily  docusate sodium, 100 mg, oral, BID  enoxaparin, 40 mg, subcutaneous, q24h  tiotropium, 2 puff, inhalation, Daily   And  fluticasone furoate-vilanteroL, 1 puff, inhalation, Daily  gabapentin, 100 mg, oral, BID  methocarbamol, 500 mg, oral, q8h CAR[place]INA  pantoprazole, 20 mg, oral, Daily  PARoxetine, 20 mg, oral, Daily before breakfast  rosuvastatin, 5 mg, oral, Daily  [2]    [3] PRN medications: naloxone, ondansetron ODT **OR** ondansetron, oxyCODONE, oxyCODONE

## 2025-07-21 NOTE — HOSPITAL COURSE
Fortunato Romano is a 69 y.o. 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 He was reportedly diagnosed with his lung cancer in 2024 and the decision was to treat with oral medication (Tagrisso).  Patient presented for recurrent pneumothorax. Hx of spontaneous pneumothorax 8/2024 leading to chest tube and recurrent pneumothoraces since as recently as 6/3/25. Patient with slow worsening of symptoms prompting admission and now s/p L VATS, partial pleurectomy/pleural biopsy on 7/18/25 by Dr. Grissom.   Post operatively CT's with ongoing air leak.  7/20 CT's placed to WS  7/21 No air leak noted, basilar CT removed on morning rounds and posterior CT clamped

## 2025-07-21 NOTE — DISCHARGE SUMMARY
Discharge Diagnosis  Pneumothorax    Issues Requiring Follow-Up  Routine follow up   Pathology results     Test Results Pending At Discharge  Pending Labs       Order Current Status    Surgical Pathology Exam In process            Hospital Course  Fortunato Romano is a 69 y.o. 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 He was reportedly diagnosed with his lung cancer in 2024 and the decision was to treat with oral medication (Tagrisso).  Patient presented for recurrent pneumothorax. Hx of spontaneous pneumothorax 8/2024 leading to chest tube and recurrent pneumothoraces since as recently as 6/3/25. Patient with slow worsening of symptoms prompting admission and now s/p L VATS, partial pleurectomy/pleural biopsy on 7/18/25 by Dr. Grissom.   Post operatively CT's with ongoing air leak.  7/20 CT's placed to WS  7/21 No air leak noted, basilar CT removed on morning rounds and posterior CT clamped Clamp trial was negative and final CT successfully removed with stable post pull image.     At the time of discharge, his pain was controlled on an oral pain regimen, He was breathing comfortably on room air.  He was ambulating independently.  He was tolerating a regular diet without nausea. He was voiding regularly.      The patient was educated on post operative activity restrictions, dietary guidelines, and pain management. He will follow up with Dr. Grissom in the outpatient setting in two weeks with a CXR just prior to this visit. The patient has been instructed to add an OTC stool softeners or laxative while taking oral analgesia.  Deep breathing, coughing, and walking at home encouraged. All questions have been answered and concerns addressed until there were none.       Visit Vitals  /82 (BP Location: Right arm, Patient Position: Lying)   Pulse 80   Temp 36.5 °C (97.7 °F) (Temporal)   Resp 18     Vitals:    07/21/25 0359    Weight: 61.5 kg (135 lb 8 oz)       Immunization History   Administered Date(s) Administered    Flu vaccine (IIV4), preservative free *Check age/dose* 01/01/2018, 12/01/2019, 09/01/2022    Flu vaccine, trivalent, preservative free, age 6 months and greater (Fluarix/Fluzone/Flulaval) 09/02/2015    Hep A / Hep B 10/11/2024, 11/12/2024    Influenza, Unspecified 09/02/2015    Influenza, seasonal, injectable 09/02/2015, 09/01/2022    Moderna COVID-19 vaccine, 12 years and older (50mcg/0.5mL)(Spikevax) 10/27/2023, 09/06/2024    Moderna COVID-19 vaccine, bivalent, blue cap/gray label *Check age/dose* 09/29/2022    Moderna SARS-CoV-2 Vaccination 03/10/2021, 04/07/2021, 10/27/2021, 04/22/2022    PPD Test 11/22/2011    Pneumococcal Conjugate PCV 7 10/09/2018    Pneumococcal conjugate vaccine, 13-valent (PREVNAR 13) 10/09/2018    Pneumococcal polysaccharide vaccine, 23-valent, age 2 years and older (PNEUMOVAX 23) 02/06/2020    RSV, 60 Years And Older (AREXVY) 01/12/2024    Tdap vaccine, age 7 year and older (BOOSTRIX, ADACEL) 10/09/2018    Zoster vaccine, recombinant, adult (SHINGRIX) 05/08/2019, 02/06/2020    Zoster, Unspecified 10/19/2022, 12/21/2022       Results  XR chest 1 view 07/21/2025    Narrative  Interpreted By:  Jarod Amanda,  STUDY:  XR CHEST 1 VIEW;  7/21/2025 9:49 am    INDICATION:  Signs/Symptoms:pneumothorax.    COMPARISON:  3:12 a.m..    ACCESSION NUMBER(S):  [id number]    ORDERING CLINICIAN:  AYANNA STANFORD    Impression  No pneumothorax seen on today's exam.  Stable left apical chest tube placement.  Cardiac silhouette is enlarged. Aorta is tortuous. Pulmonary vessels  are prominent. Patchy airspace opacity involving the left lower lobe,  likely atelectasis, slightly improved. Increased lung markings of the  remaining lungs. No pleural effusions seen.    MACRO:  None    Signed by: Jarod Amanda 7/21/2025 10:07 AM  Dictation workstation:   [id number]    7/21/25 CXR  Post CT removal with mckenzie left  apical pntx       Pertinent Physical Exam At Time of Discharge  Constitutional:       Appearance: Normal appearance.      Comments: Elderly male resting in the chair, in NAD, pleasant and cooperative    HENT:      Head: Normocephalic and atraumatic.      Mouth/Throat:      Mouth: Mucous membranes are moist.      Cardiovascular:      Rate and Rhythm: Normal rate and regular rhythm.      Comments: Telemetry with NSR   Pulmonary:      Comments: Oxygenating well on room air  Left chest tubes (basilar and posterior) with SS fluid and NO air leak , maintained to WS   Abdominal:      Comments: Soft, flat, not tender, normoactive BS, + BM   Genitourinary:     Comments: Voiding per bathroom      Musculoskeletal:      Cervical back: Neck supple.      Comments: No edema or calf tenderness       Skin:     Comments: Chest tube site with small SS drainage, small redness surrounding incision       Neurological:      General: No focal deficit present.      Mental Status: He is alert and oriented to person, place, and time.      Psychiatric:         Mood and Affect: Mood normal.         Behavior: Behavior normal.     Home Medications     Medication List      START taking these medications     docusate sodium 100 mg capsule; Commonly known as: Colace; Take 1   capsule (100 mg) by mouth 2 times a day.   oxyCODONE 5 mg immediate release tablet; Commonly known as: Roxicodone;   Take 1 tablet (5 mg) by mouth every 6 hours if needed for moderate pain (4   - 6) for up to 7 days.     CONTINUE taking these medications     acetaminophen 325 mg tablet; Commonly known as: Tylenol; Take 2 tablets   (650 mg) by mouth every 6 hours if needed for mild pain (1 - 3).   allopurinol 300 mg tablet; Commonly known as: Zyloprim; Take 1 tablet   (300 mg) by mouth once daily.   gabapentin 100 mg capsule; Commonly known as: Neurontin; Take 1 capsule   (100 mg) by mouth 2 times a day.   MEN 50 PLUS MULTIVITAMIN ORAL   olmesartan 40 mg tablet; Commonly known  as: BENIcar; Take 1 tablet (40   mg) by mouth once daily.   PARoxetine 20 mg tablet; Commonly known as: Paxil; Take 1 tablet (20 mg)   by mouth once daily in the morning. Take before meals.   Tagrisso 80 mg tablet; Generic drug: osimertinib   Trelegy Ellipta 100-62.5-25 mcg blister with device; Generic drug:   fluticasone-umeclidin-vilanter; Inhale 1 puff once daily.     STOP taking these medications     UNABLE TO FIND     ASK your doctor about these medications     rosuvastatin 5 mg tablet; Commonly known as: Crestor; Take 1 tablet (5   mg) by mouth once daily.       Outpatient Follow-Up  Future Appointments   Date Time Provider Department Center   8/6/2025 11:00 AM Sagar Grissom MD [id number] [facility]   8/11/2025  8:30 AM Lin Barbosa MD [id number] [facility]       Margarita Caceres, APRN-CNP  Thoracic and Esophageal Surgery [id number]

## 2025-07-22 ENCOUNTER — PATIENT OUTREACH (OUTPATIENT)
Dept: PRIMARY CARE | Facility: CLINIC | Age: 70
End: 2025-07-22
Payer: MEDICARE

## 2025-07-22 NOTE — PROGRESS NOTES
Discharge Facility: [facility]  Discharge Diagnosis: Pneumothorax  Admission Date: 7/18/25  Discharge Date: 7/21/25    PCP Appointment Date: 8/11/25  Specialist Appointment Date: Unknown  Hospital Encounter and Summary Linked: Yes    Discharge Summary by Margarita Caceres APRN-CNP (07/21/2025 17:09)     Two attempts were made to reach patient within two business days after discharge. Left voicemail with contact information for patient to call back with any non-emergent questions or concerns.    Bebeto Donnelly LPN

## 2025-07-23 ENCOUNTER — TELEPHONE (OUTPATIENT)
Dept: PRIMARY CARE | Facility: CLINIC | Age: 70
End: 2025-07-23
Payer: MEDICARE

## 2025-07-23 NOTE — TELEPHONE ENCOUNTER
Patient is scheduled for hospital follow up on 8/1 at 8am.   Changed the appointment to hospital discharge follow up.

## 2025-07-28 LAB
LAB AP ASR DISCLAIMER: NORMAL
LABORATORY COMMENT REPORT: NORMAL
PATH REPORT.COMMENTS IMP SPEC: NORMAL
PATH REPORT.FINAL DX SPEC: NORMAL
PATH REPORT.GROSS SPEC: NORMAL
PATH REPORT.RELEVANT HX SPEC: NORMAL
PATH REPORT.TOTAL CANCER: NORMAL

## 2025-08-01 ENCOUNTER — APPOINTMENT (OUTPATIENT)
Dept: PRIMARY CARE | Facility: CLINIC | Age: 70
End: 2025-08-01
Payer: MEDICARE

## 2025-08-01 VITALS
WEIGHT: 132 LBS | OXYGEN SATURATION: 98 % | DIASTOLIC BLOOD PRESSURE: 77 MMHG | TEMPERATURE: 97.2 F | HEART RATE: 69 BPM | SYSTOLIC BLOOD PRESSURE: 129 MMHG | BODY MASS INDEX: 21.21 KG/M2 | HEIGHT: 66 IN

## 2025-08-01 DIAGNOSIS — F41.9 ANXIETY AND DEPRESSION: ICD-10-CM

## 2025-08-01 DIAGNOSIS — E78.2 HYPERLIPEMIA, MIXED: ICD-10-CM

## 2025-08-01 DIAGNOSIS — C34.91 PRIMARY MALIGNANT NEOPLASM OF RIGHT LUNG METASTATIC TO OTHER SITE (MULTI): ICD-10-CM

## 2025-08-01 DIAGNOSIS — E78.2 MIXED HYPERLIPIDEMIA: ICD-10-CM

## 2025-08-01 DIAGNOSIS — E11.59 HYPERTENSION ASSOCIATED WITH DIABETES: ICD-10-CM

## 2025-08-01 DIAGNOSIS — J93.9 PNEUMOTHORAX ON LEFT: ICD-10-CM

## 2025-08-01 DIAGNOSIS — M10.00 IDIOPATHIC GOUT, UNSPECIFIED CHRONICITY, UNSPECIFIED SITE: Primary | ICD-10-CM

## 2025-08-01 DIAGNOSIS — I10 BENIGN ESSENTIAL HYPERTENSION: ICD-10-CM

## 2025-08-01 DIAGNOSIS — F32.A ANXIETY AND DEPRESSION: ICD-10-CM

## 2025-08-01 DIAGNOSIS — Z09 HOSPITAL DISCHARGE FOLLOW-UP: ICD-10-CM

## 2025-08-01 DIAGNOSIS — I15.2 HYPERTENSION ASSOCIATED WITH DIABETES: ICD-10-CM

## 2025-08-01 PROBLEM — Z86.011 HISTORY OF MENINGIOMA OF THE BRAIN: Status: RESOLVED | Noted: 2025-05-27 | Resolved: 2025-08-01

## 2025-08-01 PROBLEM — Z13.220 NEED FOR LIPID SCREENING: Status: RESOLVED | Noted: 2025-05-27 | Resolved: 2025-08-01

## 2025-08-01 PROCEDURE — 4010F ACE/ARB THERAPY RXD/TAKEN: CPT | Performed by: INTERNAL MEDICINE

## 2025-08-01 PROCEDURE — 3078F DIAST BP <80 MM HG: CPT | Performed by: INTERNAL MEDICINE

## 2025-08-01 PROCEDURE — 3074F SYST BP LT 130 MM HG: CPT | Performed by: INTERNAL MEDICINE

## 2025-08-01 PROCEDURE — 1111F DSCHRG MED/CURRENT MED MERGE: CPT | Performed by: INTERNAL MEDICINE

## 2025-08-01 PROCEDURE — 1160F RVW MEDS BY RX/DR IN RCRD: CPT | Performed by: INTERNAL MEDICINE

## 2025-08-01 PROCEDURE — 99496 TRANSJ CARE MGMT HIGH F2F 7D: CPT | Performed by: INTERNAL MEDICINE

## 2025-08-01 PROCEDURE — 1159F MED LIST DOCD IN RCRD: CPT | Performed by: INTERNAL MEDICINE

## 2025-08-01 PROCEDURE — 3008F BODY MASS INDEX DOCD: CPT | Performed by: INTERNAL MEDICINE

## 2025-08-01 RX ORDER — OLMESARTAN MEDOXOMIL 20 MG/1
20 TABLET ORAL DAILY
Qty: 90 TABLET | Refills: 3 | Status: SHIPPED | OUTPATIENT
Start: 2025-08-01

## 2025-08-01 NOTE — PROGRESS NOTES
"Subjective   Fortunato Romano  is a 69 y.o. male who presents for evaluation of recurrent left pneumothorax status post Left VATS, partial pleurectomy/pleural biopsy on 7/18/25.     This patient has a history of metastatic non-small cell lung cancer to the left lung.  He developed a pneumothorax in August 2024.  More recently, he developed a pneumothorax, received a chest tube for management, and was transferred to [facility] for evaluation and management.  We did not appreciate an air leak and recommended chest tube removal.  The chest tube was removed on 6/4/2025, and he was discharged.  I felt his pneumothorax was related to trapped lung in the setting of malignancy.  Serial radiographic imaging, however, suggested increasing size of his pneumothorax.  I was concerned about this and recommended surgical intervention.  He was taken to the operating room on 7/18/2025 and at that time I performed a partial pleurectomy.  He was ultimately discharged from the hospital on 7/21/2025.  He presents for postoperative evaluation.    Currently the patient is presenting for post-operative evaluation. \"I don't have pain\". Brething \"a little bit\" short. He is exercising wth walks and lifting light weight.s He denies the following symptoms: chest pain, shortness of breath at rest, cough, hemoptysis, fevers, chills, and weight loss. Wounds ok \"I feel better every day\"    There have been no significant changes to their documented medical, surgical and family history.     He  reports that he has quit smoking. His smoking use included cigarettes. He has never used smokeless tobacco. He reports current alcohol use. He reports that he does not use drugs.    Objective   Physical Exam  The patient is well-appearing and in no acute distress. The trachea is midline and there is no crepitus. The lungs were clear to auscultation, there was no dullness to percussion, no fremitus or egophony. There was good effort and excursion. " The heart had a regular rate and rhythm. The abdomen was soft, nontender and nondistended. The extremities had no edema. Surgical incisions are healing well.  Diagnostic Studies  I have reviewed a pathology result :      Component    FINAL DIAGNOSIS   A. PLEURA, LEFT; RESECTION:  -- Organizing pleuritis and reactive changes. See comment        X-ray from today shows good lung reexpansion with no significant pneumothorax  Assessment/Plan   I believe that the patient is doing well.     The patient is recently status post surgical intervention.  I believe they are recovering appropriately.  Their clinical and/or radiographic presentation suggest no evidence of postoperative complications.    I am pleased that the patient's pneumothorax has resolved and that he is recovering appropriately.  His pathology results do not show evidence of malignancy.  I am hopeful that his pneumothorax does not recur.  I believe it is appropriate for him to follow-up on an as-needed basis, and I stressed the importance of following up with his oncology team.    I recommend follow up in this office as needed. Remainder of care by other providers.     I discussed this in detail with the patient, including a discussion of alternatives. They were comfortable with this approach.     Sagar Grissom MD  [phone]

## 2025-08-01 NOTE — PROGRESS NOTES
Subjective   Patient ID: Fortunato Romano is a 69 y.o. male who presents for Hospital Follow-up (Lung surgery ).    Assessment/Plan     Problem List Items Addressed This Visit       Gout - Primary    Asymptomatic continue allopurinol 300 mg a day uric acid twice a year keep uric acid less than 6.5         Relevant Orders    CBC and Auto Differential    Comprehensive Metabolic Panel    Magnesium    Lipid Panel    Uric Acid    CK    Anxiety and depression    PHQ 4 continue Paxil 20 mg a day not suicidal         Hospital discharge follow-up    Hospital record review patient hospitalized since last visit medication list reviewed and  reconciled with discharge medications face to face visit with patient discuss discharge medication and outpatient medication ceconciliations and answers all questions to patient's and caregiver review hospital record pending diagnostic test and treatment orders to improved coordinate care across the medical community and  referal with provider/service to support patient's health and health related problems to increase patient's satisfaction by reducing risk of readmission I improving And meeting patient's needs advise bring all prescription and nonprescription medication with you.  Repeat chest x-ray CBC BMP follow-up with the oncology and cardiothoracic surgery monitor chest pain hypoxia dizziness         Lung cancer, primary, with metastasis from lung to other site (Multi)    Advised to get flu pneumonia COVID-19 vaccines refer to the dietitian for cancer cachexia advised to get Ensure Plus twice a day         Relevant Orders    CBC and Auto Differential    Comprehensive Metabolic Panel    Magnesium    Lipid Panel    Uric Acid    CK    Benign essential hypertension    Hypertension hyperlipidemia advise continue Benicar 20 mg a day Crestor 5 mg a day monitor CMP CPK lipid twice a year follow-up         Relevant Medications    olmesartan (BENIcar) 20 mg tablet    Other Relevant Orders    CBC  and Auto Differential    Comprehensive Metabolic Panel    Magnesium    Lipid Panel    Uric Acid    CK    Pneumothorax on left    Relevant Orders    CBC and Auto Differential    Comprehensive Metabolic Panel    Magnesium    Lipid Panel    Uric Acid    CK    Hyperlipidemia     Other Visit Diagnoses         Hyperlipemia, mixed        Relevant Orders    CBC and Auto Differential    Comprehensive Metabolic Panel    Magnesium    Lipid Panel    Uric Acid    CK      Hypertension associated with diabetes        Relevant Medications    olmesartan (BENIcar) 20 mg tablet          Hospital Course  Fortunato Romano is a 69 y.o. 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 He was reportedly diagnosed with his lung cancer in 2024 and the decision was to treat with oral medication (Tagrisso).  Patient presented for recurrent pneumothorax. Hx of spontaneous pneumothorax 8/2024 leading to chest tube and recurrent pneumothoraces since as recently as 6/3/25. Patient with slow worsening of symptoms prompting admission and now s/p L VATS, partial pleurectomy/pleural biopsy on 7/18/25 by Dr. Grissom.   Post operatively CT's with ongoing air leak.  7/20 CT's placed to WS  7/21 No air leak noted, basilar CT removed on morning rounds and posterior CT clamped Clamp trial was negative and final CT successfully removed with stable post pull image.  HPI 69-year-old patient had a lung cancer survival multiple hospitalization finally ended up with surgery on July 18 with VATS partial pleurectomy with the biopsy repair of the leak postoperatively follow-up in the office no chest pain no PND no orthopnea no hemoptysis    Continue decreased ADL decreased mobility insomnia anxiety with depression because of the multiple hospitalization and very bladder cancer    Because of the weight loss blood pressure running low associate orthostatic hypotension    Arthritis  continue atenolol and allopurinol    COPD continue with the Trelegy    Neuropathy chemical given B12 folic acid gabapentin    Proteinuria with hypertension cut down Benicar from 40 to 20 mg and monitor BMP    Anxiety depression PHQ 4 not suicidal continue Paxil 20 mg a day hyperlipidemia monitor the LFT CPK continue Crestor 5 mg a day Case discussed with the patient we will to be evaluated by the oncology for the lung cancer and going to follow-up with cardiothoracic surgery for the recent repair of the recurrent pneumonia thorax.  At this point patient does not have any hypoxia or hypotension or hemoptysis or chest pain clinically stable follow-up recommend thanks  Medical History[1]  Surgical History[2]  Allergies[3]  Current Medications[4]  Family History[5]  Social History[6]  Immunization History   Administered Date(s) Administered    Flu vaccine (IIV4), preservative free *Check age/dose* 01/01/2018, 12/01/2019, 09/01/2022    Flu vaccine, trivalent, preservative free, age 6 months and greater (Fluarix/Fluzone/Flulaval) 09/02/2015    Hep A / Hep B 10/11/2024, 11/12/2024    Influenza, Unspecified 09/02/2015    Influenza, seasonal, injectable 09/02/2015, 09/01/2022    Moderna COVID-19 vaccine, 12 years and older (50mcg/0.5mL)(Spikevax) 10/27/2023, 09/06/2024    Moderna COVID-19 vaccine, bivalent, blue cap/gray label *Check age/dose* 09/29/2022    Moderna SARS-CoV-2 Vaccination 03/10/2021, 04/07/2021, 10/27/2021, 04/22/2022    PPD Test 11/22/2011    Pneumococcal Conjugate PCV 7 10/09/2018    Pneumococcal conjugate vaccine, 13-valent (PREVNAR 13) 10/09/2018    Pneumococcal polysaccharide vaccine, 23-valent, age 2 years and older (PNEUMOVAX 23) 02/06/2020    RSV, 60 Years And Older (AREXVY) 01/12/2024    Tdap vaccine, age 7 year and older (BOOSTRIX, ADACEL) 10/09/2018    Zoster vaccine, recombinant, adult (SHINGRIX) 05/08/2019, 02/06/2020    Zoster, Unspecified 10/19/2022, 12/21/2022       Review of Systems  Review  "of systems is otherwise negative unless stated above or in history of present illness.    Objective   Visit Vitals  /77 (BP Location: Left arm, Patient Position: Sitting)   Pulse 69   Temp 36.2 °C (97.2 °F)   Ht 1.676 m (5' 6\")   Wt 59.9 kg (132 lb)   SpO2 98%   BMI 21.31 kg/m²   Smoking Status Former   BSA 1.67 m²     Physical Exam  Constitutional: Cachexia of cancer     General: not in acute distress.   HENT:      Head: Normocephalic and atraumatic.      Nose: Nose normal.   Eyes:      Extraocular Movements: Extraocular movements intact.      Conjunctiva/sclera: Conjunctivae normal.   Cardiovascular: Heart murmur     Rate and Rhythm: Normal rate ,  No M/R/G  Pulmonary: Diminished air entry     Effort: Pulmonary effort is normal.      Breath sounds: Normal, Bilat Equal AE  Skin:     General: Skin is warm.   Neurological:      Mental Status: He is alert and oriented to person, place, and time.   Psychiatric:   Anxiety depression without suicide hospital follow-up   mood and Affect: Mood normal.         Behavior: Behavior normal.   Musculoskeletal   FROM in all extremitirs,  Joint-no swelling or tenderness    Admission on 07/17/2025, Discharged on 07/21/2025   Component Date Value Ref Range Status    WBC 07/17/2025 5.6  4.4 - 11.3 x10*3/uL Final    nRBC 07/17/2025 0.0  0.0 - 0.0 /100 WBCs Final    RBC 07/17/2025 5.08  4.50 - 5.90 x10*6/uL Final    Hemoglobin 07/17/2025 14.9  13.5 - 17.5 g/dL Final    Hematocrit 07/17/2025 43.9  41.0 - 52.0 % Final    MCV 07/17/2025 86  80 - 100 fL Final    MCH 07/17/2025 29.3  26.0 - 34.0 pg Final    MCHC 07/17/2025 33.9  32.0 - 36.0 g/dL Final    RDW 07/17/2025 13.3  11.5 - 14.5 % Final    Platelets 07/17/2025 200  150 - 450 x10*3/uL Final    Glucose 07/17/2025 117 (H)  74 - 99 mg/dL Final    Sodium 07/17/2025 141  136 - 145 mmol/L Final    Potassium 07/17/2025 4.1  3.5 - 5.3 mmol/L Final    Chloride 07/17/2025 108 (H)  98 - 107 mmol/L Final    Bicarbonate 07/17/2025 23  21 - " 32 mmol/L Final    Anion Gap 07/17/2025 14  10 - 20 mmol/L Final    Urea Nitrogen 07/17/2025 18  6 - 23 mg/dL Final    Creatinine 07/17/2025 1.04  0.50 - 1.30 mg/dL Final    eGFR 07/17/2025 77  >60 mL/min/1.73m*2 Final    Calcium 07/17/2025 9.1  8.6 - 10.6 mg/dL Final    Phosphorus 07/17/2025 3.1  2.5 - 4.9 mg/dL Final    Albumin 07/17/2025 4.3  3.4 - 5.0 g/dL Final    Magnesium 07/17/2025 2.39  1.60 - 2.40 mg/dL Final    Protime 07/17/2025 10.9  9.8 - 12.4 seconds Final    INR 07/17/2025 1.0  0.9 - 1.1 Final    aPTT 07/17/2025 31  26 - 36 seconds Final    ABO TYPE 07/17/2025 O   Final    Rh TYPE 07/17/2025 POS   Final    ANTIBODY SCREEN 07/17/2025 NEG   Final    POCT Glucose 07/18/2025 88  74 - 99 mg/dL Final    POCT pH, Arterial 07/18/2025 7.33 (L)  7.38 - 7.42 pH Final    POCT pCO2, Arterial 07/18/2025 50 (H)  38 - 42 mm Hg Final    POCT pO2, Arterial 07/18/2025 272 (H)  85 - 95 mm Hg Final    POCT SO2, Arterial 07/18/2025 100  94 - 100 % Final    POCT Oxy Hemoglobin, Arterial 07/18/2025 97.8  94.0 - 98.0 % Final    POCT Hematocrit Calculated, Arteri* 07/18/2025 38.0 (L)  41.0 - 52.0 % Final    POCT Sodium, Arterial 07/18/2025 136  136 - 145 mmol/L Final    POCT Potassium, Arterial 07/18/2025 4.3  3.5 - 5.3 mmol/L Final    POCT Chloride, Arterial 07/18/2025 109 (H)  98 - 107 mmol/L Final    POCT Ionized Calcium, Arterial 07/18/2025 1.11  1.10 - 1.33 mmol/L Final    POCT Glucose, Arterial 07/18/2025 120 (H)  74 - 99 mg/dL Final    POCT Lactate, Arterial 07/18/2025 1.3  0.4 - 2.0 mmol/L Final    POCT Base Excess, Arterial 07/18/2025 -0.2  -2.0 - 3.0 mmol/L Final    POCT HCO3 Calculated, Arterial 07/18/2025 26.4 (H)  22.0 - 26.0 mmol/L Final    POCT Hemoglobin, Arterial 07/18/2025 12.6 (L)  13.5 - 17.5 g/dL Final    POCT Anion Gap, Arterial 07/18/2025 5 (L)  10 - 25 mmo/L Final    Patient Temperature 07/18/2025 37.0  degrees Celsius Final    FiO2 07/18/2025 100  % Final    Case Report 07/18/2025    Final              "       Value:Surgical Pathology                                Case: [id number]                                  Authorizing Provider:  Sagar Grisosm MD     Collected:           07/18/2025 1032              Ordering Location:     [facility]       Received:            07/18/2025 1201                                     [facility] OR                                                             Pathologist:           Steve Cervantes MD                                                              Specimen:    PLEURA TISSUE RESECTION LEFT, pleura biopsy                                                FINAL DIAGNOSIS 07/18/2025    Final                    Value:A. PLEURA, LEFT; RESECTION:  -- Organizing pleuritis and reactive changes. See comment        07/18/2025    Final                    Value:By the signature on this report, the individual or group listed as making the Final Interpretation/Diagnosis certifies that they have reviewed this case.       Comment 07/18/2025    Final                    Value:The reactive mesothelial cells are positive for calretinin immunostain, while they are negative for claudin4 and TTF1.       Clinical History 07/18/2025    Final                    Value:Pre-op diagnosis:  Pneumothorax on left [J93.9]    Gross Description 07/18/2025    Final                    Value:A: Received in formalin, labeled with the patient's name and hospital number and \"pleural biopsy\", are multiple segments of purple-tan membranous soft tissue aggregating to 15.4 x 6.2 x 0.7 cm.  Representative sections are submitted in one cassette.  [facility]        Disclaimer 07/18/2025    Final                    Value:One or more of the reagents used to perform assays on this specimen MAY have contained components considered to be analyte specific reagents (ASR's).  ASR's have not been cleared or approved by the U.S. Food and Drug Administration.  These assays were developed and their performance characteristics " determined by the Department of Pathology at [facility]. The FDA does not require this test to go through premarket FDA review. This test is used for clinical purposes. It should not be regarded as investigational or for research. This laboratory is certified under the Clinical Laboratory Improvement Amendments (CLIA) as qualified to perform high complexity clinical laboratory testing.  The assays were performed with appropriate positive and negative controls which stained appropriately.      POCT Glucose 07/18/2025 173 (H)  74 - 99 mg/dL Final    WBC 07/19/2025 7.8  4.4 - 11.3 x10*3/uL Final    nRBC 07/19/2025 0.0  0.0 - 0.0 /100 WBCs Final    RBC 07/19/2025 4.58  4.50 - 5.90 x10*6/uL Final    Hemoglobin 07/19/2025 13.6  13.5 - 17.5 g/dL Final    Hematocrit 07/19/2025 43.3  41.0 - 52.0 % Final    MCV 07/19/2025 95  80 - 100 fL Final    MCH 07/19/2025 29.7  26.0 - 34.0 pg Final    MCHC 07/19/2025 31.4 (L)  32.0 - 36.0 g/dL Final    RDW 07/19/2025 13.5  11.5 - 14.5 % Final    Platelets 07/19/2025 197  150 - 450 x10*3/uL Final    Glucose 07/19/2025 135 (H)  74 - 99 mg/dL Final    Sodium 07/19/2025 142  136 - 145 mmol/L Final    Potassium 07/19/2025 3.6  3.5 - 5.3 mmol/L Final    Chloride 07/19/2025 105  98 - 107 mmol/L Final    Bicarbonate 07/19/2025 30  21 - 32 mmol/L Final    Anion Gap 07/19/2025 11  10 - 20 mmol/L Final    Urea Nitrogen 07/19/2025 16  6 - 23 mg/dL Final    Creatinine 07/19/2025 1.19  0.50 - 1.30 mg/dL Final    eGFR 07/19/2025 65  >60 mL/min/1.73m*2 Final    Calcium 07/19/2025 8.5 (L)  8.6 - 10.6 mg/dL Final    Phosphorus 07/19/2025 2.4 (L)  2.5 - 4.9 mg/dL Final    Albumin 07/19/2025 3.7  3.4 - 5.0 g/dL Final    Magnesium 07/19/2025 2.28  1.60 - 2.40 mg/dL Final    WBC 07/20/2025 8.8  4.4 - 11.3 x10*3/uL Final    nRBC 07/20/2025 0.0  0.0 - 0.0 /100 WBCs Final    RBC 07/20/2025 4.16 (L)  4.50 - 5.90 x10*6/uL Final    Hemoglobin 07/20/2025 12.2 (L)  13.5 - 17.5 g/dL  Final    Hematocrit 07/20/2025 37.4 (L)  41.0 - 52.0 % Final    MCV 07/20/2025 90  80 - 100 fL Final    MCH 07/20/2025 29.3  26.0 - 34.0 pg Final    MCHC 07/20/2025 32.6  32.0 - 36.0 g/dL Final    RDW 07/20/2025 13.6  11.5 - 14.5 % Final    Platelets 07/20/2025 182  150 - 450 x10*3/uL Final    Magnesium 07/20/2025 1.96  1.60 - 2.40 mg/dL Final    Glucose 07/20/2025 113 (H)  74 - 99 mg/dL Final    Sodium 07/20/2025 140  136 - 145 mmol/L Final    Potassium 07/20/2025 3.5  3.5 - 5.3 mmol/L Final    Chloride 07/20/2025 105  98 - 107 mmol/L Final    Bicarbonate 07/20/2025 27  21 - 32 mmol/L Final    Anion Gap 07/20/2025 12  10 - 20 mmol/L Final    Urea Nitrogen 07/20/2025 16  6 - 23 mg/dL Final    Creatinine 07/20/2025 0.87  0.50 - 1.30 mg/dL Final    eGFR 07/20/2025 >90  >60 mL/min/1.73m*2 Final    Calcium 07/20/2025 8.3 (L)  8.6 - 10.6 mg/dL Final    Phosphorus 07/20/2025 2.4 (L)  2.5 - 4.9 mg/dL Final    Albumin 07/20/2025 3.6  3.4 - 5.0 g/dL Final    WBC 07/21/2025 6.3  4.4 - 11.3 x10*3/uL Final    nRBC 07/21/2025 0.0  0.0 - 0.0 /100 WBCs Final    RBC 07/21/2025 4.12 (L)  4.50 - 5.90 x10*6/uL Final    Hemoglobin 07/21/2025 12.0 (L)  13.5 - 17.5 g/dL Final    Hematocrit 07/21/2025 38.4 (L)  41.0 - 52.0 % Final    MCV 07/21/2025 93  80 - 100 fL Final    MCH 07/21/2025 29.1  26.0 - 34.0 pg Final    MCHC 07/21/2025 31.3 (L)  32.0 - 36.0 g/dL Final    RDW 07/21/2025 13.7  11.5 - 14.5 % Final    Platelets 07/21/2025 186  150 - 450 x10*3/uL Final    Magnesium 07/21/2025 2.14  1.60 - 2.40 mg/dL Final    Glucose 07/21/2025 97  74 - 99 mg/dL Final    Sodium 07/21/2025 140  136 - 145 mmol/L Final    Potassium 07/21/2025 3.8  3.5 - 5.3 mmol/L Final    Chloride 07/21/2025 107  98 - 107 mmol/L Final    Bicarbonate 07/21/2025 25  21 - 32 mmol/L Final    Anion Gap 07/21/2025 12  10 - 20 mmol/L Final    Urea Nitrogen 07/21/2025 13  6 - 23 mg/dL Final    Creatinine 07/21/2025 0.81  0.50 - 1.30 mg/dL Final    eGFR 07/21/2025 >90  >60  mL/min/1.73m*2 Final    Calcium 07/21/2025 8.6  8.6 - 10.6 mg/dL Final    Phosphorus 07/21/2025 2.6  2.5 - 4.9 mg/dL Final    Albumin 07/21/2025 3.7  3.4 - 5.0 g/dL Final   Orders Only on 06/09/2025   Component Date Value Ref Range Status    [facility] PCO2 06/09/2025 33.8 (L)  35.0 - 45.0 mmHg Final    [facility] Pressure Support. 06/09/2025 0  cmH20 Final    [facility] PO2/FIO2 RATIO 06/09/2025 322  300 - 500 Final    [facility] TEMP 06/09/2025 37.0  <=37.0 degC Final    [facility] RATE 06/09/2025 0  bpm Final    [facility] Base Excess 06/09/2025 0.9  mmol/L Final    [facility] Ventilation 06/09/2025 Rm Air^RM AIR   Final    [facility] Type of Specimen 06/09/2025 RR Art^RR ART   Final    [facility] PO2 06/09/2025 67.6 (L)  80.0 - 100.0 mmHg Final    [facility] iPAP 06/09/2025 0  cmH20 Final    [facility] O2 L/M 06/09/2025 0.0   Final    [facility] pH 06/09/2025 7.468 (H)  7.350 - 7.450 Final    [facility] PEEP 06/09/2025 0.0  cmH20 Final    [facility] O2 Saturation 06/09/2025 93.3  % Final    [facility] VT 06/09/2025 0  mL Final    [facility] VASILIY TEST 06/09/2025 Yes^YES   Final    [facility] HCO3 06/09/2025 23.9  22.0 - 26.0 mmol/L Final    [facility] FIO2 06/09/2025 21  % Final    [facility] ePAP 06/09/2025 0  cmH20 Final   Office Visit on 06/06/2025   Component Date Value Ref Range Status    CHOLESTEROL, TOTAL 06/26/2025 192  <200 mg/dL Final    HDL CHOLESTEROL 06/26/2025 52  > OR = 40 mg/dL Final    TRIGLYCERIDES 06/26/2025 109  <150 mg/dL Final    LDL-CHOLESTEROL 06/26/2025 118 (H)  mg/dL (calc) Final    CHOL/HDLC RATIO 06/26/2025 3.7  <5.0 (calc) Final    NON HDL CHOLESTEROL 06/26/2025 140 (H)  <130 mg/dL (calc) Final    HEMOGLOBIN A1c 06/26/2025 6.3 (H)  <5.7 % Final    eAG (mg/dL) 06/26/2025 134  mg/dL Final    eAG (mmol/L) 06/26/2025 7.4  mmol/L Final    WHITE BLOOD CELL COUNT 06/26/2025 5.0  3.8 - 10.8 Thousand/uL Final    RED BLOOD CELL COUNT 06/26/2025 5.04  4.20 - 5.80 Million/uL Final    HEMOGLOBIN  06/26/2025 14.9  13.2 - 17.1 g/dL Final    HEMATOCRIT 06/26/2025 46.2  38.5 - 50.0 % Final    MCV 06/26/2025 91.7  80.0 - 100.0 fL Final    MCH 06/26/2025 29.6  27.0 - 33.0 pg Final    MCHC 06/26/2025 32.3  32.0 - 36.0 g/dL Final    RDW 06/26/2025 13.6  11.0 - 15.0 % Final    PLATELET COUNT 06/26/2025 217  140 - 400 Thousand/uL Final    MPV 06/26/2025 9.7  7.5 - 12.5 fL Final    ABSOLUTE NEUTROPHILS 06/26/2025 2,690  1,500 - 7,800 cells/uL Final    ABSOLUTE LYMPHOCYTES 06/26/2025 1,560  850 - 3,900 cells/uL Final    ABSOLUTE MONOCYTES 06/26/2025 390  200 - 950 cells/uL Final    ABSOLUTE EOSINOPHILS 06/26/2025 310  15 - 500 cells/uL Final    ABSOLUTE BASOPHILS 06/26/2025 50  0 - 200 cells/uL Final    NEUTROPHILS 06/26/2025 53.8  % Final    LYMPHOCYTES 06/26/2025 31.2  % Final    MONOCYTES 06/26/2025 7.8  % Final    EOSINOPHILS 06/26/2025 6.2  % Final    BASOPHILS 06/26/2025 1.0  % Final   Admission on 06/03/2025, Discharged on 06/04/2025   Component Date Value Ref Range Status    WBC 06/04/2025 6.2  4.4 - 11.3 x10*3/uL Final    nRBC 06/04/2025 0.0  0.0 - 0.0 /100 WBCs Final    RBC 06/04/2025 4.98  4.50 - 5.90 x10*6/uL Final    Hemoglobin 06/04/2025 14.8  13.5 - 17.5 g/dL Final    Hematocrit 06/04/2025 45.2  41.0 - 52.0 % Final    MCV 06/04/2025 91  80 - 100 fL Final    MCH 06/04/2025 29.7  26.0 - 34.0 pg Final    MCHC 06/04/2025 32.7  32.0 - 36.0 g/dL Final    RDW 06/04/2025 13.3  11.5 - 14.5 % Final    Platelets 06/04/2025 203  150 - 450 x10*3/uL Final    Glucose 06/04/2025 92  74 - 99 mg/dL Final    Sodium 06/04/2025 143  136 - 145 mmol/L Final    Potassium 06/04/2025 4.0  3.5 - 5.3 mmol/L Final    Chloride 06/04/2025 109 (H)  98 - 107 mmol/L Final    Bicarbonate 06/04/2025 24  21 - 32 mmol/L Final    Anion Gap 06/04/2025 14  10 - 20 mmol/L Final    Urea Nitrogen 06/04/2025 19  6 - 23 mg/dL Final    Creatinine 06/04/2025 1.09  0.50 - 1.30 mg/dL Final    eGFR 06/04/2025 73  >60 mL/min/1.73m*2 Final    Calcium  06/04/2025 8.7  8.6 - 10.6 mg/dL Final    Magnesium 06/04/2025 2.18  1.60 - 2.40 mg/dL Final   Orders Only on 05/27/2025   Component Date Value Ref Range Status    [facility] Instr WBC 05/27/2025 8.1   Final    [facility] MCHC 05/27/2025 33.0  32.0 - 37.0 g/dL Final    [facility] MCV 05/27/2025 90.7  80.0 - 100.0 fL Final    [facility] MPV 05/27/2025 7.8  7.4 - 10.4 fL Final    [facility] HGB 05/27/2025 15.5  13.5 - 17.5 g/dL Final    [facility] RDW 05/27/2025 14.0  11.5 - 14.5 % Final    [facility] WBC 05/27/2025 8.1  4.5 - 11.0 x10 Final    [facility] MCH 05/27/2025 29.9  27.0 - 34.0 pg Final    [facility] Nucleated RBC 05/27/2025 0  /100WBC Final    [facility] HCT 05/27/2025 47.0  41.0 - 52.0 % Final    [facility] Platelet 05/27/2025 241  150 - 450 x10 Final    [facility] RBC 05/27/2025 5.18  4.70 - 6.10 x10 Final    [facility] DIFF? 05/27/2025 No^NO   Final    [facility] Eos Count 05/27/2025 0.27  0.00 - 0.50 x1000 Final    [facility] Lymph Count 05/27/2025 1.65  1.20 - 4.80 x1000 Final    [facility] Lymph % 05/27/2025 20.4  % Final    [facility] Baso Count 05/27/2025 0.07  0.00 - 0.20 x1000 Final    [facility] Basos % 05/27/2025 0.8  % Final    [facility] Mono % 05/27/2025 9.1  % Final    [facility] Mono Count 05/27/2025 0.74  0.10 - 1.00 x1000 Final    [facility] Neutrophil % 05/27/2025 66.2  % Final    [facility] Neutrophil Count (ANC) 05/27/2025 5.35  1.40 - 8.80 x1000 Final    [facility] Eosin % 05/27/2025 3.4  % Final    [facility] Na 05/27/2025 141  135 - 145 mmol/L Final    [facility] Calcium 05/27/2025 9.3  8.7 - 10.4 mg/dL Final    [facility] Glomerular Filtration R* 05/27/2025 >60  mL/min/1.73m? Final    [facility] Calculated Osmolality 05/27/2025 284  275 - 295 mOsm/kg Final    [facility] Alk Phos 05/27/2025 71  45 - 117 unit/L Final    [facility] Creatinine 05/27/2025 1.0  0.6 - 1.1 mg/dL Final    [facility] Globulin 05/27/2025 3.1  g/dL Final    [facility] Chloride 05/27/2025 105  98 - 107 mmol/L  Final    [facility] GOT 05/27/2025 24  15 - 37 unit/L Final    [facility] K 05/27/2025 3.9  3.5 - 5.1 mmol/L Final    [facility] BUN/Creat Ratio 05/27/2025 22.0   Final    [facility] GFR AA 05/27/2025 >60   Final    [facility] Bilirubin, Total 05/27/2025 0.50  0.30 - 1.20 mg/dL Final    [facility] BUN 05/27/2025 22  9 - 23 mg/dL Final    [facility] A/G Ratio 05/27/2025 1.1   Final    [facility] ALB 05/27/2025 3.4  3.4 - 5.0 g/dL Final    [facility] CO2, venous 05/27/2025 28.0  20.0 - 31.0 mmol/L Final    [facility] Glucose 05/27/2025 90  74 - 106 mg/dL Final    [facility] GPT 05/27/2025 60 (H)  10 - 49 unit/L Final    [facility] Total Protein 05/27/2025 6.5  5.7 - 8.2 g/dL Final    [facility] Calculated LDL Choleste* 05/27/2025 54 (L)  60 - 130 mg/dL Final    [facility] Total Chol/HDL Chol Rat* 05/27/2025 3.4   Final    [facility] Cholesterol 05/27/2025 148  100 - 200 mg/dL Final    [facility] HDL Cholesterol 05/27/2025 44  40 - 60 mg/dL Final    [facility] Triglycerides 05/27/2025 249 (H)  30 - 150 mg/dL Final       Radiology: Reviewed imaging in powerchart.  Imaging  No results found.    Cardiology, Vascular, and Other Imaging  No other imaging results found for the past 7 days        Charting was completed using voice recognition technology and may include unintended errors.            [1]   Past Medical History:  Diagnosis Date    Benign prostatic hyperplasia without lower urinary tract symptoms 05/25/2021    Enlarged prostate without lower urinary tract symptoms (luts)    Encounter for screening for malignant neoplasm of respiratory organs 05/02/2022    Encounter for screening for lung cancer    Essential (primary) hypertension 05/13/2021    Benign essential hypertension    Generalized anxiety disorder 05/25/2021    VIVEK (generalized anxiety disorder)    Mixed hyperlipidemia 05/13/2021    Hyperlipidemia, mixed    Other abnormal glucose     Elevated glucose    Other conditions influencing health status      Postoperative seroma    Other conditions influencing health status 02/05/2018    History of cough    Other specified soft tissue disorders 12/14/2017    Soft tissue mass    Pain in left knee 05/30/2017    Left knee pain    Personal history of benign neoplasm of the brain 05/13/2021    History of benign neoplasm of brain    Personal history of neoplasm of uncertain behavior 05/07/2018    History of neoplasm of uncertain behavior    Personal history of other diseases of the circulatory system 05/08/2019    History of hypertension    Personal history of other diseases of the digestive system 05/02/2022    History of fatty infiltration of liver    Personal history of other diseases of the musculoskeletal system and connective tissue 07/05/2022    History of polymyalgia rheumatica    Personal history of other diseases of the musculoskeletal system and connective tissue 11/04/2021    History of gout    Personal history of other diseases of the respiratory system 05/02/2022    History of sinusitis    Personal history of other diseases of the respiratory system 02/05/2018    History of upper respiratory infection    Personal history of other endocrine, nutritional and metabolic disease 11/04/2021    History of diabetes mellitus    Personal history of other endocrine, nutritional and metabolic disease 05/08/2019    History of hyperlipidemia    Personal history of other endocrine, nutritional and metabolic disease 05/25/2021    History of vitamin D deficiency    Personal history of other specified conditions 02/13/2018    History of ataxia    Personal history of other specified conditions 07/22/2016    History of urinary frequency    Personal history of other specified conditions 07/22/2016    History of fever    Personal history of other specified conditions 11/20/2017    History of bradycardia    Vitamin D deficiency, unspecified 09/08/2015    Vitamin D deficiency   [2]   Past Surgical History:  Procedure Laterality Date     MANDIBLE SURGERY  11/01/2017    Jaw Surgery    OTHER SURGICAL HISTORY  07/22/2016    Brain Surgery    PLEURA BIOPSY Left 07/18/2025    Left VATS, partial pleurectomy/pleural biopsy   [3]   Allergies  Allergen Reactions    Levonorgestrel-Ethinyl Estrad Unknown    Pollen Extracts Unknown   [4]   Current Outpatient Medications   Medication Sig Dispense Refill    acetaminophen (Tylenol) 325 mg tablet Take 2 tablets (650 mg) by mouth every 6 hours if needed for mild pain (1 - 3).      allopurinol (Zyloprim) 300 mg tablet Take 1 tablet (300 mg) by mouth once daily. (Patient taking differently: Take 1 tablet (300 mg) by mouth once daily as needed.) 90 tablet 3    fluticasone-umeclidin-vilanter (Trelegy Ellipta) 100-62.5-25 mcg blister with device Inhale 1 puff once daily. 60 each 3    gabapentin (Neurontin) 100 mg capsule Take 1 capsule (100 mg) by mouth 2 times a day. 60 capsule 2    mv-min/folic/K1/lycopen/lutein (MEN 50 PLUS MULTIVITAMIN ORAL) Take 1 tablet by mouth once daily.      osimertinib (Tagrisso) 80 mg tablet Take 1 tablet (80 mg total) by mouth once daily.      PARoxetine (Paxil) 20 mg tablet Take 1 tablet (20 mg) by mouth once daily in the morning. Take before meals. 90 tablet 3    rosuvastatin (Crestor) 5 mg tablet Take 1 tablet (5 mg) by mouth once daily. 90 tablet 3    olmesartan (BENIcar) 20 mg tablet Take 1 tablet (20 mg) by mouth once daily. 90 tablet 3     No current facility-administered medications for this visit.   [5]   Family History  Problem Relation Name Age of Onset    Stomach cancer Mother      Stomach cancer Sister      Brain cancer Son     [6]   Social History  Socioeconomic History    Marital status:    Tobacco Use    Smoking status: Former     Types: Cigarettes    Smokeless tobacco: Never   Substance and Sexual Activity    Alcohol use: Yes     Comment: occasional    Drug use: Never     Social Drivers of Health     Financial Resource Strain: Low Risk  (7/18/2025)    Overall Financial  Resource Strain (CARDIA)     Difficulty of Paying Living Expenses: Not hard at all   Food Insecurity: No Food Insecurity (7/18/2025)    Hunger Vital Sign     Worried About Running Out of Food in the Last Year: Never true     Ran Out of Food in the Last Year: Never true   Transportation Needs: No Transportation Needs (7/18/2025)    PRAPARE - Transportation     Lack of Transportation (Medical): No     Lack of Transportation (Non-Medical): No   Intimate Partner Violence: Not At Risk (7/18/2025)    Humiliation, Afraid, Rape, and Kick questionnaire     Fear of Current or Ex-Partner: No     Emotionally Abused: No     Physically Abused: No     Sexually Abused: No   Housing Stability: Low Risk  (7/18/2025)    Housing Stability Vital Sign     Unable to Pay for Housing in the Last Year: No     Number of Times Moved in the Last Year: 0     Homeless in the Last Year: No

## 2025-08-04 ENCOUNTER — PATIENT OUTREACH (OUTPATIENT)
Dept: PRIMARY CARE | Facility: CLINIC | Age: 70
End: 2025-08-04
Payer: MEDICARE

## 2025-08-05 ENCOUNTER — TELEPHONE (OUTPATIENT)
Dept: SURGERY | Facility: CLINIC | Age: 70
End: 2025-08-05
Payer: MEDICARE

## 2025-08-05 NOTE — TELEPHONE ENCOUNTER
Voicemail left for patient regarding his upcoming post op appointment. Patient reminded that he needs to have a chest xray done prior to his appointment.  Explained that patient can have xray done at any  facility or here at [facility] in the main radiology department on the first floor.  Explained that no appointment was necessary, radiology was walk in only and should arrive 1 hr prior to his appointment.  Office number left on voicemail should patient have any further questions.

## 2025-08-06 ENCOUNTER — HOSPITAL ENCOUNTER (OUTPATIENT)
Dept: RADIOLOGY | Facility: HOSPITAL | Age: 70
Discharge: HOME | End: 2025-08-06
Payer: MEDICARE

## 2025-08-06 ENCOUNTER — OFFICE VISIT (OUTPATIENT)
Dept: SURGERY | Facility: CLINIC | Age: 70
End: 2025-08-06
Payer: MEDICARE

## 2025-08-06 VITALS
OXYGEN SATURATION: 98 % | WEIGHT: 135.2 LBS | HEART RATE: 75 BPM | DIASTOLIC BLOOD PRESSURE: 80 MMHG | BODY MASS INDEX: 21.73 KG/M2 | HEIGHT: 66 IN | SYSTOLIC BLOOD PRESSURE: 136 MMHG | TEMPERATURE: 98.2 F

## 2025-08-06 DIAGNOSIS — J93.9 PNEUMOTHORAX, UNSPECIFIED TYPE: ICD-10-CM

## 2025-08-06 PROCEDURE — 71046 X-RAY EXAM CHEST 2 VIEWS: CPT

## 2025-08-06 PROCEDURE — 3075F SYST BP GE 130 - 139MM HG: CPT | Performed by: THORACIC SURGERY (CARDIOTHORACIC VASCULAR SURGERY)

## 2025-08-06 PROCEDURE — 1111F DSCHRG MED/CURRENT MED MERGE: CPT | Performed by: THORACIC SURGERY (CARDIOTHORACIC VASCULAR SURGERY)

## 2025-08-06 PROCEDURE — 3008F BODY MASS INDEX DOCD: CPT | Performed by: THORACIC SURGERY (CARDIOTHORACIC VASCULAR SURGERY)

## 2025-08-06 PROCEDURE — 3079F DIAST BP 80-89 MM HG: CPT | Performed by: THORACIC SURGERY (CARDIOTHORACIC VASCULAR SURGERY)

## 2025-08-06 PROCEDURE — 1159F MED LIST DOCD IN RCRD: CPT | Performed by: THORACIC SURGERY (CARDIOTHORACIC VASCULAR SURGERY)

## 2025-08-06 PROCEDURE — 99211 OFF/OP EST MAY X REQ PHY/QHP: CPT | Performed by: THORACIC SURGERY (CARDIOTHORACIC VASCULAR SURGERY)

## 2025-08-06 PROCEDURE — 71046 X-RAY EXAM CHEST 2 VIEWS: CPT | Performed by: RADIOLOGY

## 2025-08-06 PROCEDURE — 1126F AMNT PAIN NOTED NONE PRSNT: CPT | Performed by: THORACIC SURGERY (CARDIOTHORACIC VASCULAR SURGERY)

## 2025-08-06 ASSESSMENT — ENCOUNTER SYMPTOMS
OCCASIONAL FEELINGS OF UNSTEADINESS: 0
LOSS OF SENSATION IN FEET: 0
DEPRESSION: 0

## 2025-08-06 ASSESSMENT — PAIN SCALES - GENERAL: PAINLEVEL_OUTOF10: 0-NO PAIN

## 2025-08-11 ENCOUNTER — APPOINTMENT (OUTPATIENT)
Dept: PRIMARY CARE | Facility: CLINIC | Age: 70
End: 2025-08-11
Payer: MEDICARE

## 2025-08-19 ENCOUNTER — PATIENT OUTREACH (OUTPATIENT)
Dept: PRIMARY CARE | Facility: CLINIC | Age: 70
End: 2025-08-19
Payer: MEDICARE

## 2025-11-03 ENCOUNTER — APPOINTMENT (OUTPATIENT)
Dept: PRIMARY CARE | Facility: CLINIC | Age: 70
End: 2025-11-03
Payer: MEDICARE

## (undated) DEVICE — STRIP, SKIN CLOSURE, STERI STRIP, REINFORCED, 0.5 X 4 IN

## (undated) DEVICE — CATHETER, THORACIC, STRAIGHT, ADULT, 28 FR, PVC

## (undated) DEVICE — SUTURE, ETHIBOND, XTRA, 30 IN, 0, CT-1, GREEN

## (undated) DEVICE — ADHESIVE, SKIN, DERMABOND ADVANCED, 15CM, PEN-STYLE

## (undated) DEVICE — Device

## (undated) DEVICE — SUTURE, VICRYL, 2-0, 27 IN, CT-1, VIOLET

## (undated) DEVICE — ANTIFOG, SOLUTION, FOG-OUT

## (undated) DEVICE — DRAPE, TIBURON, SPLIT SHEET, REINF ADH STRIP, 77X122

## (undated) DEVICE — SHEET, SPLIT, CARDIOVASCULAR TIBURON

## (undated) DEVICE — DRESSING, SPONGE, GAUZE, CURITY, 4 X 4 IN, STERILE

## (undated) DEVICE — DRESSING, TRANSPARENT, TEGADERM, 4 X 4-3/4 IN

## (undated) DEVICE — SUTURE, MONOCRYL, 3-0, 18 IN, PS2, UNDYED

## (undated) DEVICE — CARE KIT, LAPAROSCOPIC, ADVANCED

## (undated) DEVICE — TUBING, SUCTION, CONNECTING, STERILE 0.25 X 120 IN., LF

## (undated) DEVICE — DRESSING, ADHESIVE, ISLAND, TELFA, 2 X 3.75 IN, LF

## (undated) DEVICE — SUTURE, SILK, 2-0, 30 IN, SH, BLACK

## (undated) DEVICE — INSTRUMENT, DISSECTING, ENDOSCOPIC, PEANUT, 5 MM, STERILE

## (undated) DEVICE — COVER, CART, 45 X 27 X 48 IN, CLEAR

## (undated) DEVICE — COLLECTION UNIT, DRAINAGE, THORACIC, SINGLE TUBE, DRY SUCTION, ATS COMPATIBLE, OASIS 3600, LF

## (undated) DEVICE — MANIFOLD, 4 PORT NEPTUNE STANDARD

## (undated) DEVICE — PUMP, STRYKERFLOW 2 & HANDPIECE W/10FT. IRRIGATION TUBING

## (undated) DEVICE — SUTURE, SILK, 0, 24 IN, SUTUPAK, BLACK

## (undated) DEVICE — COVER, TABLE, UHC